# Patient Record
Sex: FEMALE | Race: WHITE | NOT HISPANIC OR LATINO | Employment: OTHER
[De-identification: names, ages, dates, MRNs, and addresses within clinical notes are randomized per-mention and may not be internally consistent; named-entity substitution may affect disease eponyms.]

---

## 2017-04-25 ENCOUNTER — RECORDS - HEALTHEAST (OUTPATIENT)
Dept: ADMINISTRATIVE | Facility: OTHER | Age: 67
End: 2017-04-25

## 2017-05-16 ENCOUNTER — RECORDS - HEALTHEAST (OUTPATIENT)
Dept: ADMINISTRATIVE | Facility: OTHER | Age: 67
End: 2017-05-16

## 2017-07-07 ENCOUNTER — OFFICE VISIT - HEALTHEAST (OUTPATIENT)
Dept: INTERNAL MEDICINE | Facility: CLINIC | Age: 67
End: 2017-07-07

## 2017-07-07 DIAGNOSIS — Z00.00 ROUTINE GENERAL MEDICAL EXAMINATION AT A HEALTH CARE FACILITY: ICD-10-CM

## 2017-07-07 LAB
CEA SERPL-MCNC: 0.8 NG/ML (ref 0–3)
CHOLEST SERPL-MCNC: 181 MG/DL
FASTING STATUS PATIENT QL REPORTED: YES
HDLC SERPL-MCNC: 53 MG/DL
LDLC SERPL CALC-MCNC: 108 MG/DL
TRIGL SERPL-MCNC: 100 MG/DL

## 2017-07-07 ASSESSMENT — MIFFLIN-ST. JEOR: SCORE: 1316.01

## 2017-07-10 ENCOUNTER — COMMUNICATION - HEALTHEAST (OUTPATIENT)
Dept: INTERNAL MEDICINE | Facility: CLINIC | Age: 67
End: 2017-07-10

## 2017-11-01 ENCOUNTER — COMMUNICATION - HEALTHEAST (OUTPATIENT)
Dept: INTERNAL MEDICINE | Facility: CLINIC | Age: 67
End: 2017-11-01

## 2017-11-01 ENCOUNTER — AMBULATORY - HEALTHEAST (OUTPATIENT)
Dept: INTERNAL MEDICINE | Facility: CLINIC | Age: 67
End: 2017-11-01

## 2017-11-01 DIAGNOSIS — M81.0 OSTEOPOROSIS: ICD-10-CM

## 2017-11-02 ENCOUNTER — AMBULATORY - HEALTHEAST (OUTPATIENT)
Dept: SCHEDULING | Facility: CLINIC | Age: 67
End: 2017-11-02

## 2017-11-02 DIAGNOSIS — M81.0 OSTEOPOROSIS: ICD-10-CM

## 2017-12-04 ENCOUNTER — HOSPITAL ENCOUNTER (OUTPATIENT)
Dept: MAMMOGRAPHY | Facility: HOSPITAL | Age: 67
Discharge: HOME | End: 2017-12-04
Attending: INTERNAL MEDICINE

## 2017-12-04 DIAGNOSIS — Z12.31 VISIT FOR SCREENING MAMMOGRAM: ICD-10-CM

## 2018-01-02 ENCOUNTER — RECORDS - HEALTHEAST (OUTPATIENT)
Dept: ADMINISTRATIVE | Facility: OTHER | Age: 68
End: 2018-01-02

## 2018-01-22 ENCOUNTER — RECORDS - HEALTHEAST (OUTPATIENT)
Dept: ADMINISTRATIVE | Facility: OTHER | Age: 68
End: 2018-01-22

## 2018-02-20 ENCOUNTER — RECORDS - HEALTHEAST (OUTPATIENT)
Dept: ADMINISTRATIVE | Facility: OTHER | Age: 68
End: 2018-02-20

## 2018-03-19 ENCOUNTER — RECORDS - HEALTHEAST (OUTPATIENT)
Dept: ADMINISTRATIVE | Facility: OTHER | Age: 68
End: 2018-03-19

## 2018-05-01 ENCOUNTER — RECORDS - HEALTHEAST (OUTPATIENT)
Dept: ADMINISTRATIVE | Facility: OTHER | Age: 68
End: 2018-05-01

## 2018-06-19 ENCOUNTER — COMMUNICATION - HEALTHEAST (OUTPATIENT)
Dept: INTERNAL MEDICINE | Facility: CLINIC | Age: 68
End: 2018-06-19

## 2018-12-04 ENCOUNTER — COMMUNICATION - HEALTHEAST (OUTPATIENT)
Dept: INTERNAL MEDICINE | Facility: CLINIC | Age: 68
End: 2018-12-04

## 2018-12-05 ENCOUNTER — AMBULATORY - HEALTHEAST (OUTPATIENT)
Dept: INTERNAL MEDICINE | Facility: CLINIC | Age: 68
End: 2018-12-05

## 2018-12-05 DIAGNOSIS — S46.001A ROTATOR CUFF INJURY, RIGHT, INITIAL ENCOUNTER: ICD-10-CM

## 2018-12-17 ENCOUNTER — RECORDS - HEALTHEAST (OUTPATIENT)
Dept: ADMINISTRATIVE | Facility: OTHER | Age: 68
End: 2018-12-17

## 2018-12-21 ENCOUNTER — RECORDS - HEALTHEAST (OUTPATIENT)
Dept: ADMINISTRATIVE | Facility: OTHER | Age: 68
End: 2018-12-21

## 2018-12-26 ENCOUNTER — COMMUNICATION - HEALTHEAST (OUTPATIENT)
Dept: INTERNAL MEDICINE | Facility: CLINIC | Age: 68
End: 2018-12-26

## 2019-01-02 ENCOUNTER — RECORDS - HEALTHEAST (OUTPATIENT)
Dept: ADMINISTRATIVE | Facility: OTHER | Age: 69
End: 2019-01-02

## 2019-01-03 ENCOUNTER — RECORDS - HEALTHEAST (OUTPATIENT)
Dept: ADMINISTRATIVE | Facility: OTHER | Age: 69
End: 2019-01-03

## 2019-01-03 ENCOUNTER — OFFICE VISIT - HEALTHEAST (OUTPATIENT)
Dept: INTERNAL MEDICINE | Facility: CLINIC | Age: 69
End: 2019-01-03

## 2019-01-03 DIAGNOSIS — Z12.11 SCREEN FOR COLON CANCER: ICD-10-CM

## 2019-01-03 DIAGNOSIS — Z01.818 PREOPERATIVE EXAMINATION: ICD-10-CM

## 2019-01-03 LAB
ALBUMIN SERPL-MCNC: 3.6 G/DL (ref 3.5–5)
ALP SERPL-CCNC: 85 U/L (ref 45–120)
ALT SERPL W P-5'-P-CCNC: 14 U/L (ref 0–45)
ANION GAP SERPL CALCULATED.3IONS-SCNC: 10 MMOL/L (ref 5–18)
AST SERPL W P-5'-P-CCNC: 18 U/L (ref 0–40)
ATRIAL RATE - MUSE: 83 BPM
BILIRUB SERPL-MCNC: 1.4 MG/DL (ref 0–1)
BUN SERPL-MCNC: 20 MG/DL (ref 8–22)
CALCIUM SERPL-MCNC: 9.4 MG/DL (ref 8.5–10.5)
CHLORIDE BLD-SCNC: 105 MMOL/L (ref 98–107)
CO2 SERPL-SCNC: 27 MMOL/L (ref 22–31)
CREAT SERPL-MCNC: 0.72 MG/DL (ref 0.6–1.1)
DIASTOLIC BLOOD PRESSURE - MUSE: NORMAL MMHG
ERYTHROCYTE [DISTWIDTH] IN BLOOD BY AUTOMATED COUNT: 12.1 % (ref 11–14.5)
GFR SERPL CREATININE-BSD FRML MDRD: >60 ML/MIN/1.73M2
GLUCOSE BLD-MCNC: 122 MG/DL (ref 70–125)
HCT VFR BLD AUTO: 37.8 % (ref 35–47)
HGB BLD-MCNC: 12.5 G/DL (ref 12–16)
INTERPRETATION ECG - MUSE: NORMAL
MCH RBC QN AUTO: 29.2 PG (ref 27–34)
MCHC RBC AUTO-ENTMCNC: 33 G/DL (ref 32–36)
MCV RBC AUTO: 89 FL (ref 80–100)
P AXIS - MUSE: 73 DEGREES
PLATELET # BLD AUTO: 194 THOU/UL (ref 140–440)
PMV BLD AUTO: 9.3 FL (ref 7–10)
POTASSIUM BLD-SCNC: 3.9 MMOL/L (ref 3.5–5)
PR INTERVAL - MUSE: 156 MS
PROT SERPL-MCNC: 6.6 G/DL (ref 6–8)
QRS DURATION - MUSE: 90 MS
QT - MUSE: 372 MS
QTC - MUSE: 437 MS
R AXIS - MUSE: 54 DEGREES
RBC # BLD AUTO: 4.27 MILL/UL (ref 3.8–5.4)
SODIUM SERPL-SCNC: 142 MMOL/L (ref 136–145)
SYSTOLIC BLOOD PRESSURE - MUSE: NORMAL MMHG
T AXIS - MUSE: 66 DEGREES
VENTRICULAR RATE- MUSE: 83 BPM
WBC: 5 THOU/UL (ref 4–11)

## 2019-01-03 ASSESSMENT — MIFFLIN-ST. JEOR: SCORE: 1294.46

## 2019-01-04 ENCOUNTER — COMMUNICATION - HEALTHEAST (OUTPATIENT)
Dept: INTERNAL MEDICINE | Facility: CLINIC | Age: 69
End: 2019-01-04

## 2019-01-07 ENCOUNTER — RECORDS - HEALTHEAST (OUTPATIENT)
Dept: ADMINISTRATIVE | Facility: OTHER | Age: 69
End: 2019-01-07

## 2019-01-08 ENCOUNTER — OFFICE VISIT - HEALTHEAST (OUTPATIENT)
Dept: PODIATRY | Facility: CLINIC | Age: 69
End: 2019-01-08

## 2019-01-08 DIAGNOSIS — M21.6X2 PRONATION DEFORMITY OF BOTH FEET: ICD-10-CM

## 2019-01-08 DIAGNOSIS — L84 TYLOMA: ICD-10-CM

## 2019-01-08 DIAGNOSIS — M21.6X1 PRONATION DEFORMITY OF BOTH FEET: ICD-10-CM

## 2019-01-08 ASSESSMENT — MIFFLIN-ST. JEOR: SCORE: 1353.88

## 2019-01-10 ENCOUNTER — AMBULATORY - HEALTHEAST (OUTPATIENT)
Dept: OTHER | Facility: CLINIC | Age: 69
End: 2019-01-10

## 2019-01-11 ENCOUNTER — COMMUNICATION - HEALTHEAST (OUTPATIENT)
Dept: OTHER | Facility: CLINIC | Age: 69
End: 2019-01-11

## 2019-01-25 ENCOUNTER — AMBULATORY - HEALTHEAST (OUTPATIENT)
Dept: OTHER | Facility: CLINIC | Age: 69
End: 2019-01-25

## 2019-01-29 ENCOUNTER — OFFICE VISIT - HEALTHEAST (OUTPATIENT)
Dept: INTERNAL MEDICINE | Facility: CLINIC | Age: 69
End: 2019-01-29

## 2019-01-29 DIAGNOSIS — Z00.00 ROUTINE GENERAL MEDICAL EXAMINATION AT A HEALTH CARE FACILITY: ICD-10-CM

## 2019-01-29 LAB
ALBUMIN SERPL-MCNC: 3.7 G/DL (ref 3.5–5)
ALBUMIN UR-MCNC: NEGATIVE MG/DL
ALP SERPL-CCNC: 85 U/L (ref 45–120)
ALT SERPL W P-5'-P-CCNC: 14 U/L (ref 0–45)
ANION GAP SERPL CALCULATED.3IONS-SCNC: 10 MMOL/L (ref 5–18)
APPEARANCE UR: CLEAR
AST SERPL W P-5'-P-CCNC: 17 U/L (ref 0–40)
BACTERIA #/AREA URNS HPF: ABNORMAL HPF
BILIRUB SERPL-MCNC: 1.2 MG/DL (ref 0–1)
BILIRUB UR QL STRIP: NEGATIVE
BUN SERPL-MCNC: 18 MG/DL (ref 8–22)
CALCIUM SERPL-MCNC: 9.4 MG/DL (ref 8.5–10.5)
CAOX CRY #/AREA URNS HPF: PRESENT /[HPF]
CEA SERPL-MCNC: 0.9 NG/ML (ref 0–3)
CHLORIDE BLD-SCNC: 106 MMOL/L (ref 98–107)
CHOLEST SERPL-MCNC: 170 MG/DL
CO2 SERPL-SCNC: 27 MMOL/L (ref 22–31)
COLOR UR AUTO: YELLOW
CREAT SERPL-MCNC: 0.64 MG/DL (ref 0.6–1.1)
ERYTHROCYTE [DISTWIDTH] IN BLOOD BY AUTOMATED COUNT: 12.7 % (ref 11–14.5)
FASTING STATUS PATIENT QL REPORTED: YES
GFR SERPL CREATININE-BSD FRML MDRD: >60 ML/MIN/1.73M2
GLUCOSE BLD-MCNC: 79 MG/DL (ref 70–125)
GLUCOSE UR STRIP-MCNC: NEGATIVE MG/DL
HCT VFR BLD AUTO: 40.2 % (ref 35–47)
HDLC SERPL-MCNC: 74 MG/DL
HGB BLD-MCNC: 12.3 G/DL (ref 12–16)
HGB UR QL STRIP: NEGATIVE
KETONES UR STRIP-MCNC: NEGATIVE MG/DL
LDLC SERPL CALC-MCNC: 82 MG/DL
LEUKOCYTE ESTERASE UR QL STRIP: ABNORMAL
MCH RBC QN AUTO: 28.3 PG (ref 27–34)
MCHC RBC AUTO-ENTMCNC: 30.6 G/DL (ref 32–36)
MCV RBC AUTO: 93 FL (ref 80–100)
MUCOUS THREADS #/AREA URNS LPF: ABNORMAL LPF
NITRATE UR QL: NEGATIVE
PH UR STRIP: 6 [PH] (ref 4.5–8)
PLATELET # BLD AUTO: 194 THOU/UL (ref 140–440)
PMV BLD AUTO: 13.3 FL (ref 8.5–12.5)
POTASSIUM BLD-SCNC: 3.5 MMOL/L (ref 3.5–5)
PROT SERPL-MCNC: 6.5 G/DL (ref 6–8)
RBC # BLD AUTO: 4.34 MILL/UL (ref 3.8–5.4)
RBC #/AREA URNS AUTO: ABNORMAL HPF
SODIUM SERPL-SCNC: 143 MMOL/L (ref 136–145)
SP GR UR STRIP: 1.01 (ref 1–1.03)
SQUAMOUS #/AREA URNS AUTO: ABNORMAL LPF
TRIGL SERPL-MCNC: 68 MG/DL
TSH SERPL DL<=0.005 MIU/L-ACNC: 2.07 UIU/ML (ref 0.3–5)
UROBILINOGEN UR STRIP-ACNC: ABNORMAL
WBC #/AREA URNS AUTO: ABNORMAL HPF
WBC: 4 THOU/UL (ref 4–11)

## 2019-01-29 ASSESSMENT — MIFFLIN-ST. JEOR: SCORE: 1299

## 2019-01-30 ENCOUNTER — COMMUNICATION - HEALTHEAST (OUTPATIENT)
Dept: INTERNAL MEDICINE | Facility: CLINIC | Age: 69
End: 2019-01-30

## 2019-01-30 LAB — BACTERIA SPEC CULT: NO GROWTH

## 2019-01-31 ENCOUNTER — COMMUNICATION - HEALTHEAST (OUTPATIENT)
Dept: INTERNAL MEDICINE | Facility: CLINIC | Age: 69
End: 2019-01-31

## 2019-02-15 ENCOUNTER — RECORDS - HEALTHEAST (OUTPATIENT)
Dept: ADMINISTRATIVE | Facility: OTHER | Age: 69
End: 2019-02-15

## 2019-03-26 ENCOUNTER — RECORDS - HEALTHEAST (OUTPATIENT)
Dept: ADMINISTRATIVE | Facility: OTHER | Age: 69
End: 2019-03-26

## 2019-07-02 ENCOUNTER — OFFICE VISIT - HEALTHEAST (OUTPATIENT)
Dept: INTERNAL MEDICINE | Facility: CLINIC | Age: 69
End: 2019-07-02

## 2019-07-02 DIAGNOSIS — M81.0 AGE-RELATED OSTEOPOROSIS WITHOUT CURRENT PATHOLOGICAL FRACTURE: ICD-10-CM

## 2019-07-02 LAB
CALCIUM SERPL-MCNC: 9.5 MG/DL (ref 8.5–10.5)
CREAT SERPL-MCNC: 0.67 MG/DL (ref 0.6–1.1)
GFR SERPL CREATININE-BSD FRML MDRD: >60 ML/MIN/1.73M2
PHOSPHATE SERPL-MCNC: 4.6 MG/DL (ref 2.5–4.5)
PTH-INTACT SERPL-MCNC: 69 PG/ML (ref 10–86)

## 2019-07-03 LAB
25(OH)D3 SERPL-MCNC: 16.2 NG/ML (ref 30–80)
GLIADIN IGA SER-ACNC: 2.2 U/ML
GLIADIN IGG SER-ACNC: <0.4 U/ML
IGA SERPL-MCNC: 225 MG/DL (ref 65–400)
TTG IGA SER-ACNC: 0.7 U/ML
TTG IGG SER-ACNC: <0.6 U/ML

## 2019-07-05 ENCOUNTER — AMBULATORY - HEALTHEAST (OUTPATIENT)
Dept: LAB | Facility: CLINIC | Age: 69
End: 2019-07-05

## 2019-07-05 DIAGNOSIS — M81.0 AGE-RELATED OSTEOPOROSIS WITHOUT CURRENT PATHOLOGICAL FRACTURE: ICD-10-CM

## 2019-07-05 LAB
ALBUMIN PERCENT: 64.8 % (ref 51–67)
ALBUMIN SERPL ELPH-MCNC: 4 G/DL (ref 3.2–4.7)
ALPHA 1 PERCENT: 2.6 % (ref 2–4)
ALPHA 2 PERCENT: 10 % (ref 5–13)
ALPHA1 GLOB SERPL ELPH-MCNC: 0.2 G/DL (ref 0.1–0.3)
ALPHA2 GLOB SERPL ELPH-MCNC: 0.6 G/DL (ref 0.4–0.9)
B-GLOBULIN SERPL ELPH-MCNC: 0.8 G/DL (ref 0.7–1.2)
BETA PERCENT: 12.2 % (ref 10–17)
CALCIUM 24H UR-MRATE: 284 MG/24HR (ref 20–275)
CALCIUM UR-MCNC: 10.8 MG/DL
GAMMA GLOB SERPL ELPH-MCNC: 0.6 G/DL (ref 0.6–1.4)
GAMMA GLOBULIN PERCENT: 10.4 % (ref 9–20)
PATH ICD:: NORMAL
PATH ICD:: NORMAL
PROT PATTERN SERPL ELPH-IMP: NORMAL
PROT PATTERN SERPL ELPH-IMP: NORMAL
PROT SERPL-MCNC: 6.2 G/DL (ref 6–8)
REVIEWING PATHOLOGIST: NORMAL
REVIEWING PATHOLOGIST: NORMAL
SPECIMEN VOL UR: 2625 ML
TOTAL PROTEIN RANDOM URINE MG/DL: 9 MG/DL

## 2019-07-10 ENCOUNTER — COMMUNICATION - HEALTHEAST (OUTPATIENT)
Dept: INTERNAL MEDICINE | Facility: CLINIC | Age: 69
End: 2019-07-10

## 2019-07-10 DIAGNOSIS — M81.0 AGE-RELATED OSTEOPOROSIS WITHOUT CURRENT PATHOLOGICAL FRACTURE: ICD-10-CM

## 2019-07-26 ENCOUNTER — COMMUNICATION - HEALTHEAST (OUTPATIENT)
Dept: ADMINISTRATIVE | Facility: CLINIC | Age: 69
End: 2019-07-26

## 2019-07-30 ENCOUNTER — RECORDS - HEALTHEAST (OUTPATIENT)
Dept: ADMINISTRATIVE | Facility: OTHER | Age: 69
End: 2019-07-30

## 2019-09-06 ENCOUNTER — RECORDS - HEALTHEAST (OUTPATIENT)
Dept: ADMINISTRATIVE | Facility: OTHER | Age: 69
End: 2019-09-06

## 2019-09-11 ENCOUNTER — COMMUNICATION - HEALTHEAST (OUTPATIENT)
Dept: SCHEDULING | Facility: CLINIC | Age: 69
End: 2019-09-11

## 2019-09-11 ENCOUNTER — AMBULATORY - HEALTHEAST (OUTPATIENT)
Dept: INTERNAL MEDICINE | Facility: CLINIC | Age: 69
End: 2019-09-11

## 2019-09-11 ENCOUNTER — AMBULATORY - HEALTHEAST (OUTPATIENT)
Dept: LAB | Facility: CLINIC | Age: 69
End: 2019-09-11

## 2019-09-11 ENCOUNTER — COMMUNICATION - HEALTHEAST (OUTPATIENT)
Dept: INTERNAL MEDICINE | Facility: CLINIC | Age: 69
End: 2019-09-11

## 2019-09-11 DIAGNOSIS — R30.0 DYSURIA: ICD-10-CM

## 2019-09-11 LAB
ALBUMIN UR-MCNC: NEGATIVE MG/DL
APPEARANCE UR: CLEAR
BILIRUB UR QL STRIP: NEGATIVE
COLOR UR AUTO: YELLOW
GLUCOSE UR STRIP-MCNC: NEGATIVE MG/DL
HGB UR QL STRIP: NEGATIVE
KETONES UR STRIP-MCNC: NEGATIVE MG/DL
LEUKOCYTE ESTERASE UR QL STRIP: NEGATIVE
NITRATE UR QL: NEGATIVE
PH UR STRIP: 6.5 [PH] (ref 5–8)
SP GR UR STRIP: 1.01 (ref 1–1.03)
UROBILINOGEN UR STRIP-ACNC: NORMAL

## 2019-11-16 ENCOUNTER — RECORDS - HEALTHEAST (OUTPATIENT)
Dept: ADMINISTRATIVE | Facility: OTHER | Age: 69
End: 2019-11-16

## 2019-12-09 ENCOUNTER — COMMUNICATION - HEALTHEAST (OUTPATIENT)
Dept: ADMINISTRATIVE | Facility: CLINIC | Age: 69
End: 2019-12-09

## 2020-01-03 ENCOUNTER — COMMUNICATION - HEALTHEAST (OUTPATIENT)
Dept: VASCULAR SURGERY | Facility: CLINIC | Age: 70
End: 2020-01-03

## 2020-01-06 ENCOUNTER — AMBULATORY - HEALTHEAST (OUTPATIENT)
Dept: PODIATRY | Facility: CLINIC | Age: 70
End: 2020-01-06

## 2020-01-06 DIAGNOSIS — M21.6X1 PRONATION DEFORMITY OF BOTH FEET: ICD-10-CM

## 2020-01-06 DIAGNOSIS — M21.6X2 PRONATION DEFORMITY OF BOTH FEET: ICD-10-CM

## 2020-01-09 ENCOUNTER — AMBULATORY - HEALTHEAST (OUTPATIENT)
Dept: OTHER | Facility: CLINIC | Age: 70
End: 2020-01-09

## 2020-02-03 ENCOUNTER — AMBULATORY - HEALTHEAST (OUTPATIENT)
Dept: OTHER | Facility: CLINIC | Age: 70
End: 2020-02-03

## 2020-06-15 ENCOUNTER — COMMUNICATION - HEALTHEAST (OUTPATIENT)
Dept: SCHEDULING | Facility: CLINIC | Age: 70
End: 2020-06-15

## 2020-07-08 ENCOUNTER — COMMUNICATION - HEALTHEAST (OUTPATIENT)
Dept: SCHEDULING | Facility: CLINIC | Age: 70
End: 2020-07-08

## 2020-07-09 ENCOUNTER — OFFICE VISIT - HEALTHEAST (OUTPATIENT)
Dept: FAMILY MEDICINE | Facility: CLINIC | Age: 70
End: 2020-07-09

## 2020-07-09 DIAGNOSIS — S80.861S TICK BITE OF RIGHT LOWER LEG, SEQUELA: ICD-10-CM

## 2020-07-09 DIAGNOSIS — L03.115 CELLULITIS OF RIGHT LOWER EXTREMITY: ICD-10-CM

## 2020-07-09 DIAGNOSIS — A69.20 LYME DISEASE: ICD-10-CM
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
## 2020-07-09 DIAGNOSIS — Z13.9 SCREENING FOR CONDITION: ICD-10-CM

## 2020-07-09 LAB
BASOPHILS # BLD AUTO: 0 THOU/UL (ref 0–0.2)
BASOPHILS NFR BLD AUTO: 1 % (ref 0–2)
EOSINOPHIL # BLD AUTO: 0.3 THOU/UL (ref 0–0.4)
EOSINOPHIL NFR BLD AUTO: 5 % (ref 0–6)
ERYTHROCYTE [DISTWIDTH] IN BLOOD BY AUTOMATED COUNT: 13 % (ref 11–14.5)
HCT VFR BLD AUTO: 36.7 % (ref 35–47)
HGB BLD-MCNC: 12.5 G/DL (ref 12–16)
LYMPHOCYTES # BLD AUTO: 1.8 THOU/UL (ref 0.8–4.4)
LYMPHOCYTES NFR BLD AUTO: 26 % (ref 20–40)
MCH RBC QN AUTO: 29.5 PG (ref 27–34)
MCHC RBC AUTO-ENTMCNC: 34.1 G/DL (ref 32–36)
MCV RBC AUTO: 87 FL (ref 80–100)
MONOCYTES # BLD AUTO: 0.5 THOU/UL (ref 0–0.9)
MONOCYTES NFR BLD AUTO: 7 % (ref 2–10)
NEUTROPHILS # BLD AUTO: 4.2 THOU/UL (ref 2–7.7)
NEUTROPHILS NFR BLD AUTO: 61 % (ref 50–70)
PLATELET # BLD AUTO: 153 THOU/UL (ref 140–440)
PMV BLD AUTO: 10 FL (ref 7–10)
RBC # BLD AUTO: 4.24 MILL/UL (ref 3.8–5.4)
WBC: 6.9 THOU/UL (ref 4–11)

## 2020-07-10 ENCOUNTER — COMMUNICATION - HEALTHEAST (OUTPATIENT)
Dept: SCHEDULING | Facility: CLINIC | Age: 70
End: 2020-07-10

## 2020-07-10 LAB — B BURGDOR IGG+IGM SER QL: 0.05 INDEX VALUE

## 2020-07-11 ENCOUNTER — OFFICE VISIT - HEALTHEAST (OUTPATIENT)
Dept: FAMILY MEDICINE | Facility: CLINIC | Age: 70
End: 2020-07-11

## 2020-07-11 DIAGNOSIS — S80.861S TICK BITE OF RIGHT LOWER LEG, SEQUELA: ICD-10-CM

## 2020-07-11 DIAGNOSIS — Z51.89 ENCOUNTER FOR WOUND RE-CHECK: ICD-10-CM
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
## 2020-07-11 DIAGNOSIS — L03.115 CELLULITIS OF RIGHT LOWER EXTREMITY: ICD-10-CM

## 2020-07-28 ENCOUNTER — OFFICE VISIT - HEALTHEAST (OUTPATIENT)
Dept: INTERNAL MEDICINE | Facility: CLINIC | Age: 70
End: 2020-07-28

## 2020-07-28 DIAGNOSIS — R10.32 GROIN PAIN, LEFT: ICD-10-CM

## 2020-07-28 ASSESSMENT — MIFFLIN-ST. JEOR: SCORE: 1357.96

## 2020-08-25 ENCOUNTER — AMBULATORY - HEALTHEAST (OUTPATIENT)
Dept: ADMINISTRATIVE | Facility: CLINIC | Age: 70
End: 2020-08-25

## 2020-08-25 DIAGNOSIS — K46.9 ABDOMINAL HERNIA WITHOUT OBSTRUCTION AND WITHOUT GANGRENE, RECURRENCE NOT SPECIFIED, UNSPECIFIED HERNIA TYPE: ICD-10-CM

## 2020-08-28 ENCOUNTER — OFFICE VISIT - HEALTHEAST (OUTPATIENT)
Dept: SURGERY | Facility: CLINIC | Age: 70
End: 2020-08-28

## 2020-08-28 DIAGNOSIS — K40.90 LEFT INGUINAL HERNIA: ICD-10-CM

## 2020-08-28 ASSESSMENT — MIFFLIN-ST. JEOR: SCORE: 1348.89

## 2020-08-31 ENCOUNTER — COMMUNICATION - HEALTHEAST (OUTPATIENT)
Dept: SURGERY | Facility: CLINIC | Age: 70
End: 2020-08-31

## 2020-09-01 ENCOUNTER — AMBULATORY - HEALTHEAST (OUTPATIENT)
Dept: SURGERY | Facility: AMBULATORY SURGERY CENTER | Age: 70
End: 2020-09-01

## 2020-09-01 ENCOUNTER — SURGERY - HEALTHEAST (OUTPATIENT)
Dept: SURGERY | Facility: CLINIC | Age: 70
End: 2020-09-01

## 2020-09-01 DIAGNOSIS — Z11.59 ENCOUNTER FOR SCREENING FOR OTHER VIRAL DISEASES: ICD-10-CM

## 2020-09-03 ENCOUNTER — COMMUNICATION - HEALTHEAST (OUTPATIENT)
Dept: INTERNAL MEDICINE | Facility: CLINIC | Age: 70
End: 2020-09-03

## 2020-09-11 ENCOUNTER — OFFICE VISIT - HEALTHEAST (OUTPATIENT)
Dept: INTERNAL MEDICINE | Facility: CLINIC | Age: 70
End: 2020-09-11

## 2020-09-11 DIAGNOSIS — Z01.818 PREOPERATIVE EXAMINATION: ICD-10-CM

## 2020-09-11 LAB
ALBUMIN SERPL-MCNC: 3.8 G/DL (ref 3.5–5)
ALP SERPL-CCNC: 73 U/L (ref 45–120)
ALT SERPL W P-5'-P-CCNC: 10 U/L (ref 0–45)
ANION GAP SERPL CALCULATED.3IONS-SCNC: 9 MMOL/L (ref 5–18)
AST SERPL W P-5'-P-CCNC: 15 U/L (ref 0–40)
ATRIAL RATE - MUSE: 72 BPM
BILIRUB SERPL-MCNC: 0.7 MG/DL (ref 0–1)
BUN SERPL-MCNC: 11 MG/DL (ref 8–28)
CALCIUM SERPL-MCNC: 9.3 MG/DL (ref 8.5–10.5)
CHLORIDE BLD-SCNC: 104 MMOL/L (ref 98–107)
CO2 SERPL-SCNC: 27 MMOL/L (ref 22–31)
CREAT SERPL-MCNC: 0.68 MG/DL (ref 0.6–1.1)
DIASTOLIC BLOOD PRESSURE - MUSE: NORMAL
ERYTHROCYTE [DISTWIDTH] IN BLOOD BY AUTOMATED COUNT: 13.1 % (ref 11–14.5)
GFR SERPL CREATININE-BSD FRML MDRD: >60 ML/MIN/1.73M2
GLUCOSE BLD-MCNC: 81 MG/DL (ref 70–125)
HCT VFR BLD AUTO: 38.3 % (ref 35–47)
HGB BLD-MCNC: 12.7 G/DL (ref 12–16)
INTERPRETATION ECG - MUSE: NORMAL
MCH RBC QN AUTO: 29.5 PG (ref 27–34)
MCHC RBC AUTO-ENTMCNC: 33.1 G/DL (ref 32–36)
MCV RBC AUTO: 89 FL (ref 80–100)
P AXIS - MUSE: 36 DEGREES
PLATELET # BLD AUTO: 167 THOU/UL (ref 140–440)
PMV BLD AUTO: 10.1 FL (ref 7–10)
POTASSIUM BLD-SCNC: 4 MMOL/L (ref 3.5–5)
PR INTERVAL - MUSE: 134 MS
PROT SERPL-MCNC: 6.7 G/DL (ref 6–8)
QRS DURATION - MUSE: 92 MS
QT - MUSE: 392 MS
QTC - MUSE: 429 MS
R AXIS - MUSE: 63 DEGREES
RBC # BLD AUTO: 4.3 MILL/UL (ref 3.8–5.4)
SODIUM SERPL-SCNC: 140 MMOL/L (ref 136–145)
SYSTOLIC BLOOD PRESSURE - MUSE: NORMAL
T AXIS - MUSE: 62 DEGREES
VENTRICULAR RATE- MUSE: 72 BPM
WBC: 4.5 THOU/UL (ref 4–11)

## 2020-09-11 ASSESSMENT — MIFFLIN-ST. JEOR: SCORE: 1335.85

## 2020-09-14 ENCOUNTER — AMBULATORY - HEALTHEAST (OUTPATIENT)
Dept: FAMILY MEDICINE | Facility: CLINIC | Age: 70
End: 2020-09-14

## 2020-09-14 ENCOUNTER — COMMUNICATION - HEALTHEAST (OUTPATIENT)
Dept: INTERNAL MEDICINE | Facility: CLINIC | Age: 70
End: 2020-09-14

## 2020-09-14 ENCOUNTER — RECORDS - HEALTHEAST (OUTPATIENT)
Dept: ADMINISTRATIVE | Facility: OTHER | Age: 70
End: 2020-09-14

## 2020-09-14 DIAGNOSIS — Z11.59 ENCOUNTER FOR SCREENING FOR OTHER VIRAL DISEASES: ICD-10-CM

## 2020-09-16 ENCOUNTER — ANESTHESIA - HEALTHEAST (OUTPATIENT)
Dept: SURGERY | Facility: AMBULATORY SURGERY CENTER | Age: 70
End: 2020-09-16

## 2020-09-16 ENCOUNTER — COMMUNICATION - HEALTHEAST (OUTPATIENT)
Dept: SCHEDULING | Facility: CLINIC | Age: 70
End: 2020-09-16

## 2020-09-17 ENCOUNTER — SURGERY - HEALTHEAST (OUTPATIENT)
Dept: SURGERY | Facility: AMBULATORY SURGERY CENTER | Age: 70
End: 2020-09-17

## 2020-09-17 ASSESSMENT — MIFFLIN-ST. JEOR: SCORE: 1356.83

## 2020-10-06 ENCOUNTER — COMMUNICATION - HEALTHEAST (OUTPATIENT)
Dept: SURGERY | Facility: CLINIC | Age: 70
End: 2020-10-06

## 2020-10-08 ENCOUNTER — OFFICE VISIT - HEALTHEAST (OUTPATIENT)
Dept: SURGERY | Facility: CLINIC | Age: 70
End: 2020-10-08

## 2020-10-08 DIAGNOSIS — K40.90 LEFT INGUINAL HERNIA: ICD-10-CM

## 2020-10-27 ENCOUNTER — RECORDS - HEALTHEAST (OUTPATIENT)
Dept: ADMINISTRATIVE | Facility: OTHER | Age: 70
End: 2020-10-27

## 2020-10-29 ENCOUNTER — RECORDS - HEALTHEAST (OUTPATIENT)
Dept: ADMINISTRATIVE | Facility: OTHER | Age: 70
End: 2020-10-29
Payer: COMMERCIAL

## 2020-11-12 ENCOUNTER — VIRTUAL VISIT (OUTPATIENT)
Dept: FAMILY MEDICINE | Facility: CLINIC | Age: 70
End: 2020-11-12
Payer: COMMERCIAL

## 2020-11-12 DIAGNOSIS — R68.83 CHILLS: ICD-10-CM

## 2020-11-12 DIAGNOSIS — Z20.822 EXPOSURE TO COVID-19 VIRUS: ICD-10-CM

## 2020-11-12 DIAGNOSIS — R52 BODY ACHES: Primary | ICD-10-CM

## 2020-11-12 PROCEDURE — 99214 OFFICE O/P EST MOD 30 MIN: CPT | Mod: 95 | Performed by: PHYSICIAN ASSISTANT

## 2020-11-12 RX ORDER — KETOCONAZOLE 20 MG/G
CREAM TOPICAL
COMMUNITY
Start: 2020-04-27

## 2020-11-12 NOTE — PATIENT INSTRUCTIONS
Your symptoms show that you may have coronavirus (COVID-19). This illness can cause fever, cough and trouble  breathing. Many people get a mild case and get better on their own. Some people can get very sick.    Quarantine is for those who have had a close contact to someone who is COVID positive. A close contact is defined as being within 6 feet for 15 minutes or longer. We recommend you stay home and away from others for 14 days to monitor for symptoms. If you develop symptoms, enter isolation guidelines and be tested for COVID-19.    Isolation is for those who have symptoms or test positive for COVID-19. You should remain home and away from others for 10 days after your symptom onset. You may return to activities after 10 days as long as you have been fever free for 24 hours and have had an improvement in your symptoms.    What should I do?  1. We would like to test you for this virus.    2. When it s time for your COVID test:    Stay at least 6 feet away from others. (If someone will drive you to your test, stay in the backseat, as  far away from the  as you can.)    Cover your mouth and nose with a mask, tissue or washcloth.    Go straight to the testing site. Don't make any stops on the way there or back.    3. Starting now: Stay home and away from others (self-isolate) until: November 22, 2020    You've had no fever--and no medicine that reduces fever--for 3 full days (72 hours). And     Your other symptoms have gotten better. For example, your cough or breathing has improved. And     At least 10 days have passed since your symptoms started.    4. During this time, don t leave the house except for testing or medical care.    Stay in your own room, even for meals. Use your own bathroom if you can.    Stay away from others in your home. No hugging, kissing or shaking hands. No visitors.    Don t go to work, school or anywhere else.    Clean  high touch  surfaces often (doorknobs, counters, handles, etc.).  Use a household cleaning  spray or wipes. You ll find a full list of  on the EPA website: www.epa.gov/pesticideregistration/  list-n-disinfectants-use-against-sars-cov-2.    Cover your mouth and nose with a mask, tissue or washcloth to avoid spreading germs.    Wash your hands and face often. Use soap and water.    Caregivers in these groups are at risk for severe illness due to COVID-19:  o People 65 years and older  o People who live in a nursing home or long-term care facility  o People with chronic disease (lung, heart, cancer, diabetes, kidney, liver, immunologic)  o People who have a weakened immune system, including those who:    Are in cancer treatment    Take medicine that weakens the immune system, such as corticosteroids    Had a bone marrow or organ transplant    Have an immune deficiency    Have poorly controlled HIV or AIDS    Are obese (body mass index of 40 or higher)    Smoke regularly  o Caregivers should wear gloves while washing dishes, handling laundry and cleaning  bedrooms and bathrooms.  o Use caution when washing and drying laundry: Don t shake dirty laundry, and use the  warmest water setting that you can.  o For more tips, go to www.cdc.gov/coronavirus/2019-ncov/downloads/10Things.pdf.    How can I take care of myself?  1. Get lots of rest. Drink extra fluids (unless a doctor has told you not to).  2. Take Tylenol (acetaminophen) for fever or pain. If you have liver or kidney problems, ask your family  doctor if it's okay to take Tylenol.  Adults can take either:    650 mg (two 325 mg pills) every 4 to 6 hours, or     1,000 mg (two 500 mg pills) every 8 hours as needed.    Note: Don't take more than 3,000 mg in one day. Acetaminophen is found in many medicines  (both prescribed and over-the-counter medicines). Read all labels to be sure you don t take too  much.  For children, check the Tylenol bottle for the right dose. The dose is based on the child's age or weight.  3. If you  have other health problems (like cancer, heart failure, an organ transplant or severe kidney  disease): Call your specialty clinic if you don't feel better in the next 2 days.  4. Know when to call 911. Emergency warning signs include:    Trouble breathing or shortness of breath    Pain or pressure in the chest that doesn t go away    Feeling confused like you haven t felt before, or not being able to wake up    Bluish-colored lips or face    Where can I get more information?    [facility] - About COVID-19: [email or web address]    CDC - What to Do If You re Sick: www.cdc.gov/coronavirus/2019-ncov/about/steps-when-sick.html    CDC - Ending Home Isolation: www.cdc.gov/coronavirus/2019-ncov/hcp/disposition-in-homepatients.  html    [facility] - Caring for Someone: www.cdc.gov/coronavirus/2019-ncov/if-you-are-sick/care-for-someone.html    [facility] - Interim Guidance for Hospital Discharge to Home:  www.[facility].[place]./diseases/coronavirus/hcp/hospdischarge.pdf    [facility] clinical trials (COVID-19 research studies): clinicalaffairs.[facility]-clinicaltrials    Below are the COVID-19 hotlines at the [place] Department of Health ([facility]). Interpreters are  available.  o For health questions: Call 614-186-7990 or 1-911.486.3921 (7 a.m. to 7 p.m.)  o For questions about schools and childcare: Call 815-522-5207 or [phone] (7 a.m. to 7  p.m.)

## 2020-11-12 NOTE — PROGRESS NOTES
"Vidya Brink is a 70 year old female who is being evaluated via a billable telephone visit.      The patient has been notified of following:     \"This telephone visit will be conducted via a call between you and your physician/provider. We have found that certain health care needs can be provided without the need for a physical exam.  This service lets us provide the care you need with a short phone conversation.  If a prescription is necessary we can send it directly to your pharmacy.  If lab work is needed we can place an order for that and you can then stop by our lab to have the test done at a later time.    Telephone visits are billed at different rates depending on your insurance coverage. During this emergency period, for some insurers they may be billed the same as an in-person visit.  Please reach out to your insurance provider with any questions.    If during the course of the call the physician/provider feels a telephone visit is not appropriate, you will not be charged for this service.\"    Patient has given verbal consent for Telephone visit?  Yes    What phone number would you like to be contacted at? [phone]    How would you like to obtain your AVS? Mail a copy    Subjective     Vidya Brink is a 70 year old female who presents via phone visit today for the following health issues:    HPI     Acute Illness  Acute illness concerns: cough with post nasal drip,  tested positive for covid on Monday  Onset/Duration: x 1day  Symptoms:  Fever: no  Chills/Sweats: no  Headache (location?): YES, slight  Sinus Pressure: no  Conjunctivitis:  no  Ear Pain: no  Rhinorrhea: YES, post nasal and runny nose  Congestion: no  Sore Throat: YES, feels lightly raw since today  Cough: YES-non-productive  Wheeze: no  Decreased Appetite: no  Nausea: no  Vomiting: no  Diarrhea: YES,loose stools  Dysuria/Freq.: no  Dysuria or Hematuria: no  Fatigue/Achiness: no  Sick/Strep Exposure: no  Therapies tried " and outcome: elderberry gummies and zinc, feels about the same     Vidya presents via telephone visit today for evaluation of COVID exposure and subsequent minor symptoms. She states that she and her  attended a meeting and her  would not wear his mask while there but she did wear her mask and maintain a distance the entire time. He then left to go hunting and came home on Sunday Nov 8 feeling sick. She immediately packed her bags and left, within an hour or so of him coming home. They were not in the same room for more than a couple minutes. Then last night she just didn't feel great. She had some body aches, a slight headache, a little rhinorrhea with post nasal drip and loose stools.     Review of Systems          Objective          Vitals:  No vitals were obtained today due to virtual visit.    healthy, alert and no distress  PSYCH: Alert and oriented times 3; coherent speech, normal   rate and volume, able to articulate logical thoughts, able   to abstract reason, no tangential thoughts, no hallucinations   or delusions  Her affect is normal and pleasant  RESP: No cough, no audible wheezing, able to talk in full sentences  Remainder of exam unable to be completed due to telephone visits      Assessment/Plan:    Assessment & Plan     Body aches  - COVID-19 GetWell Loop Referral  - Symptomatic COVID-19 Virus (Coronavirus) by PCR; Future    Chills  - COVID-19 GetWell Loop Referral  - Symptomatic COVID-19 Virus (Coronavirus) by PCR; Future    Exposure to COVID-19 virus  Exposure was not considered a high risk exposure. This was on November 8 so her COVID test should be scheduled for Nov 13-15 if possible.    Return in about 2 days (around 11/14/2020) for a COVID test.    Stephanie Suárez PA-C  [facility]    Phone call duration:  11 minutes

## 2020-11-13 DIAGNOSIS — R52 BODY ACHES: ICD-10-CM

## 2020-11-13 DIAGNOSIS — R68.83 CHILLS: ICD-10-CM

## 2020-11-13 PROCEDURE — U0003 INFECTIOUS AGENT DETECTION BY NUCLEIC ACID (DNA OR RNA); SEVERE ACUTE RESPIRATORY SYNDROME CORONAVIRUS 2 (SARS-COV-2) (CORONAVIRUS DISEASE [COVID-19]), AMPLIFIED PROBE TECHNIQUE, MAKING USE OF HIGH THROUGHPUT TECHNOLOGIES AS DESCRIBED BY CMS-2020-01-R: HCPCS | Performed by: PHYSICIAN ASSISTANT

## 2020-11-14 ENCOUNTER — HEALTH MAINTENANCE LETTER (OUTPATIENT)
Age: 70
End: 2020-11-14

## 2020-11-14 LAB
SARS-COV-2 RNA SPEC QL NAA+PROBE: NOT DETECTED
SPECIMEN SOURCE: NORMAL

## 2021-01-28 ENCOUNTER — RECORDS - HEALTHEAST (OUTPATIENT)
Dept: ADMINISTRATIVE | Facility: OTHER | Age: 71
End: 2021-01-28

## 2021-05-24 ENCOUNTER — RECORDS - HEALTHEAST (OUTPATIENT)
Dept: ADMINISTRATIVE | Facility: CLINIC | Age: 71
End: 2021-05-24

## 2021-05-25 ENCOUNTER — RECORDS - HEALTHEAST (OUTPATIENT)
Dept: ADMINISTRATIVE | Facility: CLINIC | Age: 71
End: 2021-05-25

## 2021-05-26 ENCOUNTER — RECORDS - HEALTHEAST (OUTPATIENT)
Dept: ADMINISTRATIVE | Facility: CLINIC | Age: 71
End: 2021-05-26

## 2021-05-26 VITALS
HEART RATE: 83 BPM | SYSTOLIC BLOOD PRESSURE: 97 MMHG | TEMPERATURE: 98.5 F | RESPIRATION RATE: 12 BRPM | OXYGEN SATURATION: 96 % | DIASTOLIC BLOOD PRESSURE: 61 MMHG

## 2021-05-27 ENCOUNTER — RECORDS - HEALTHEAST (OUTPATIENT)
Dept: ADMINISTRATIVE | Facility: CLINIC | Age: 71
End: 2021-05-27

## 2021-05-28 ENCOUNTER — RECORDS - HEALTHEAST (OUTPATIENT)
Dept: ADMINISTRATIVE | Facility: CLINIC | Age: 71
End: 2021-05-28

## 2021-05-30 ENCOUNTER — RECORDS - HEALTHEAST (OUTPATIENT)
Dept: ADMINISTRATIVE | Facility: CLINIC | Age: 71
End: 2021-05-30

## 2021-05-30 NOTE — TELEPHONE ENCOUNTER
Patient is calling concerned about the bill from her last apt in January.  She came in to get updated orthotics.  During apt she had a corn debrided and has received a bill for $134 and is upset because she didn't ask for this to be done. She would like someone to call and speak with her about this.

## 2021-05-30 NOTE — PROGRESS NOTES
Dear Dr. Peterson ,  Your patient, Vidya Brink was seen today for management of osteoporosis.  The last bone density scan was done on 12/4/2017:  1. The spine bone density L1-L2 with T-score -2.7.  2. Femoral bone densities show left femoral neck T- score -1.8 and right total hip T-score -1.5.  3. Trabecular bone score indicates poor trabecular bone architecture.    Patient was treated in the past with Fosamax for few weeks ( stopped because of the stiff joints), and Actonel for 5 years - stopped in 2011. No active treatment since then.    Social history:  reports that she has never smoked. She has never used smokeless tobacco. She reports that she does not drink alcohol or use drugs.    Past medical history: James syndrome and partial colectomy, rectal cancer 1989.  Patient Active Problem List   Diagnosis     Adenocarcinoid Tumor Of The Large Intestine     Community-acquired Pneumonia     Osteoporosis     Abnormal Blood Chemistry     Dysphagia     Headache     Palpitations     Osteopenia     Health maintenance examination     Otalgia of right ear     Ceruminosis     History of fractures: only toe fracture     FH: her mother had OP and hip fracture, her both sister has low bone density  Family History   Problem Relation Age of Onset     Breast cancer Paternal Grandmother 48       Diet and supplements: Ca 600 mg two times a day, vit D 400 Iu bid    Risk medications: none    Gynecologic history: menopause age 49, no HRT    Laboratory testing:   Component      Latest Ref Rng & Units 1/29/2019   Sodium      136 - 145 mmol/L 143   Potassium      3.5 - 5.0 mmol/L 3.5   Chloride      98 - 107 mmol/L 106   CO2      22 - 31 mmol/L 27   Anion Gap, Calculation      5 - 18 mmol/L 10   Glucose      70 - 125 mg/dL 79   BUN      8 - 22 mg/dL 18   Creatinine      0.60 - 1.10 mg/dL 0.64   GFR MDRD Af Amer      >60 mL/min/1.73m2 >60   GFR MDRD Non Af Amer      >60 mL/min/1.73m2 >60   Bilirubin, Total      0.0 - 1.0 mg/dL 1.2 (H)    Calcium      8.5 - 10.5 mg/dL 9.4   Protein, Total      6.0 - 8.0 g/dL 6.5   ALBUMIN      3.5 - 5.0 g/dL 3.7   Alkaline Phosphatase      45 - 120 U/L 85   AST      0 - 40 U/L 17   ALT      0 - 45 U/L 14   WBC      4.0 - 11.0 thou/uL 4.0   RBC      3.80 - 5.40 mill/uL 4.34   Hemoglobin      12.0 - 16.0 g/dL 12.3   Hematocrit      35.0 - 47.0 % 40.2   MCV      80 - 100 fL 93   MCH      27.0 - 34.0 pg 28.3   MCHC      32.0 - 36.0 g/dL 30.6 (L)   RDW      11.0 - 14.5 % 12.7   Platelets      140 - 440 thou/uL 194   MPV      8.5 - 12.5 fL 13.3 (H)   TSH      0.30 - 5.00 uIU/mL 2.07       ROS:     Constitutional: Negative.    HENT: Negative.    Eyes: Negative.    Respiratory: Negative.    Cardiovascular: Negative.    Gastrointestinal: Negative.    Endocrine: Negative.    Genitourinary: Negative.    Musculoskeletal: Negative.    Skin: Negative.    Allergic/Immunologic: Negative.    Neurological: Negative.    Hematological: Negative.    Psychiatric/Behavioral: Negative.      PE:  /62   Pulse 83   Wt 168 lb 14.4 oz (76.6 kg)   LMP 11/11/2001   SpO2 96%   BMI 26.06 kg/m    Constitutional:  oriented to person, place, and time, appears well-nourished. No distress.   Head: normocephalic  Neck:supple  Lungs: no labored breathing  Musculoskeletal: normal gait, no swelling  Neuro: no focal neurological deficits          Impression:   1. Age-related osteoporosis without current pathological fracture  Vitamin D, Total (25-Hydroxy)    Parathyroid Hormone Intact with Minerals    Electrophoresis, Protein, Serum, Cascade    Electrophoresis, Protein, Random Urine Ccade    Celiac(Gluten)Antibody Panel    Calcium, 24 Hour Urine       Plan:  1. The patient will take 1200 - 1500 mg of calcium daily from the diet and supplements.  2. The patient will take 2000 IU of vit D daily.  3. Treatment options discussed with the patient. I will check PTH, SPEP, 24 h urine calcium and vit D. We will wait for Prolia approval.  4. I am asking  for follow when Prolia approved .    Patient Instructions   Prolia 1st when approved by your insurance.  Prolia 2nd in 6 months. I will see you in 1 year.    DXA in 12/2019 in [place] .   Phone number to schedule [phone].    Daily calcium need is 5854-8345 mg a day from the diet and supplements.  Calcium citrate is easier to digest.  Vitamin D 2000 IU daily recommended.        Thank you for the opportunity to participate in the care of your patient. If you have any additional questions, do not hesitate to contact me at [facility].    This note has been dictated using voice recognition software. Any grammatical or context distortions are unintentional and inherent to the software      With best personal regards,   Jessica Butler MD, CCD  7/2/2019

## 2021-05-30 NOTE — TELEPHONE ENCOUNTER
Attempted to call but was not able to leave voicemail. Will try back at a later time.  Josefa Georges CMA ............... 3:52 PM, 07/10/19

## 2021-05-30 NOTE — TELEPHONE ENCOUNTER
Patient Returning Call  Reason for call:  Patient   Information relayed to patient:  Message below regarding Vitamin D levels and the need to start a Vitamin D 50,000 units weekly and Vitamin D 2,000 units daily relayed to the patient. Patient also informed other lab results are good.  Patient has additional questions:  Yes  If YES, what are your questions/concerns:  Patient says she will start the Vitamin D 50,000 units but is concerned of possible side effects of constipation and/or diarrhea due to colon problems.  Patient is questioning if this should be a concern or if there any other side effects she should watch for?  Okay to leave a detailed message?: Yes

## 2021-05-30 NOTE — PATIENT INSTRUCTIONS - HE
Prolia 1st when approved by your insurance.  Prolia 2nd in 6 months. I will see you in 1 year.    DXA in 12/2019 in [place] .   Phone number to schedule [phone].    Daily calcium need is 7774-5270 mg a day from the diet and supplements.  Calcium citrate is easier to digest.  Vitamin D 2000 IU daily recommended.

## 2021-05-30 NOTE — TELEPHONE ENCOUNTER
Left voicemail for patient to return call to clinic. When patient returns call, please give them below message. Letter also sent.    Josefa Georges CMA ............... 11:07 AM, 07/12/19

## 2021-05-30 NOTE — TELEPHONE ENCOUNTER
----- Message from Jessica Butler MD sent at 7/10/2019  2:50 PM CDT -----  Call the pt and mail results.  Very low vit D. Rx for ergocalciferol is sent and pt also needs to take 2000 IU of vit D3 OTC daily. Other lab results are good.

## 2021-05-30 NOTE — TELEPHONE ENCOUNTER
Left detailed voicemail for patient with information below.  Josefa Georges CMA ............... 12:55 PM, 07/12/19

## 2021-05-31 VITALS — WEIGHT: 163.5 LBS | BODY MASS INDEX: 24.22 KG/M2 | HEIGHT: 69 IN

## 2021-05-31 NOTE — TELEPHONE ENCOUNTER
Writer left message for patient, stating that our manager would be taking a look into her concern when she returns next week.

## 2021-06-01 NOTE — TELEPHONE ENCOUNTER
Triage call:   Patient indicates that she thinks she has a UTI. Symptoms:  burning, bleeding- resolved when she increased her fluids, increased frequency. no fever or low back pain. Symptoms present since the weekend. Has been increasing her fluids but still having symptoms.      Triaged to be seen in the office- patient declines an appointment but is requesting that a UA/UC be ordered and she drops off a specimen. PCP please advise.     *Ok to leave a detailed message upon call back    Pharmacy and allergies verified.     Christal Raymundo RN [facility] Care Connection Triage/Med Refill 9/11/2019 11:38 AM    Reason for Disposition    Age > 50 years    Protocols used: URINATION PAIN - FEMALE-A-[place]

## 2021-06-01 NOTE — TELEPHONE ENCOUNTER
Test Results  Who is calling?:  Patient   Who ordered the test:  Dr. Persaud  Type of test: Lab  Date of test:  9/11/2019  Where was the test performed:  [facility]  What are your questions/concerns?:  Patient is calling requesting UA results.      Writer informed the patient of the message from Dr Persaud stating that the UA was normal.  Patient stated understanding.    Notes recorded by Harish Persaud MD on 9/11/2019 at 4:15 PM CDT  Please call patient urine looks okay, excellent    Okay to leave a detailed message?:  No need to return call.

## 2021-06-01 NOTE — TELEPHONE ENCOUNTER
Spoke with patient- she inquires about dropping specimen off in [place]- she doesn't want to come downtown today    Advised she could call the [facility] to set up lab appt-and we could send orders there.    She declines- she will come in this afternoon to leave UA

## 2021-06-02 VITALS — HEIGHT: 68 IN | WEIGHT: 165 LBS | BODY MASS INDEX: 25.01 KG/M2

## 2021-06-02 VITALS — HEIGHT: 68 IN | BODY MASS INDEX: 24.86 KG/M2 | WEIGHT: 164 LBS

## 2021-06-02 VITALS — WEIGHT: 177.1 LBS | HEIGHT: 68 IN | BODY MASS INDEX: 26.84 KG/M2

## 2021-06-02 NOTE — TELEPHONE ENCOUNTER
Spoke with patient and she explained her concerns. Pt indicated she always addresses her own corns and did not expect him to remove. Pt was there for orthotics and Dr Rivero just removed it without even discussing. I have forwarded to Customer [employer] and request a credit for one time for service billed.

## 2021-06-03 VITALS — WEIGHT: 168.9 LBS | BODY MASS INDEX: 26.06 KG/M2

## 2021-06-04 VITALS
DIASTOLIC BLOOD PRESSURE: 70 MMHG | WEIGHT: 176 LBS | BODY MASS INDEX: 26.67 KG/M2 | HEIGHT: 68 IN | SYSTOLIC BLOOD PRESSURE: 120 MMHG

## 2021-06-04 VITALS
TEMPERATURE: 98.3 F | OXYGEN SATURATION: 97 % | SYSTOLIC BLOOD PRESSURE: 100 MMHG | WEIGHT: 178 LBS | BODY MASS INDEX: 26.98 KG/M2 | HEART RATE: 76 BPM | HEIGHT: 68 IN | DIASTOLIC BLOOD PRESSURE: 60 MMHG

## 2021-06-04 VITALS
HEART RATE: 68 BPM | SYSTOLIC BLOOD PRESSURE: 105 MMHG | OXYGEN SATURATION: 97 % | RESPIRATION RATE: 16 BRPM | BODY MASS INDEX: 27.53 KG/M2 | WEIGHT: 178.38 LBS | TEMPERATURE: 98.4 F | DIASTOLIC BLOOD PRESSURE: 68 MMHG

## 2021-06-04 NOTE — TELEPHONE ENCOUNTER
Patient no longer has any questions. Nothing further needed.  Josefa Georges CMA ............... 11:06 AM, 12/16/19

## 2021-06-04 NOTE — TELEPHONE ENCOUNTER
Attempted to call but was not able to leave voicemail. Will try back at a later time.  Josefa Georges CMA ............... 2:36 PM, 12/09/19

## 2021-06-04 NOTE — TELEPHONE ENCOUNTER
Vidya is calling and would like another order for orthotic inserts.  Can you place another order or she is ok coming in for an apt.

## 2021-06-05 VITALS
OXYGEN SATURATION: 98 % | HEART RATE: 79 BPM | TEMPERATURE: 97.7 F | WEIGHT: 174 LBS | BODY MASS INDEX: 27.31 KG/M2 | SYSTOLIC BLOOD PRESSURE: 118 MMHG | DIASTOLIC BLOOD PRESSURE: 70 MMHG | HEIGHT: 67 IN

## 2021-06-05 VITALS — WEIGHT: 176 LBS | HEIGHT: 68 IN | BODY MASS INDEX: 26.67 KG/M2

## 2021-06-05 NOTE — PROGRESS NOTES
"S: Patient is a 70 y/o female, 5'7\", 168 lbs, seen at our [place] office for the evaluation and casting for custom foot orthotics on orders from Dr. Josefa DPM for the treatment of Dx: pronation deformity of both feet.     O: Patient presents with bilateral pes planus, pronation deformity, and slight valgus deformity of the ankle bilaterally. Patient c/o pain in both her feet throughout the day that is exacerbates by weight-bearing and ambulation. Patient ROM and MMT scores for foot and ankle bilaterally are all WNL.     G: Patient's custom foot orthotic will help to support, stabilize and correct the angular deformity of the patient's foot and ankle complex. Patient requires a custom solution as no OTS option will provide the necessary angular correction.     A: I took bilateral biofoam impression of the patient's feet for the fabrication of custom FOs. Patient's FOs will be fabricated using a base of copoly, mid layer of matte poron, and top cover of JERROD. Patient's FOs will also include a P-wing addition in order to help correct the angular deformity of the patient's foot and ankle complex. Patient requested sulcus length of her custom FOs as well.     P: Patient was asked to schedule her appointment for two weeks time as she was leaving our office.   "

## 2021-06-05 NOTE — PROGRESS NOTES
"S: Patient is a 70 y/o female, 5'7\", 168 lbs, seen at our [place] office for the evaluation and casting for custom foot orthotics on orders from Dr. Josefa DPM for the treatment of Dx: pronation deformity of both feet.     O: Patient presents with bilateral pes planus, pronation deformity, and slight valgus deformity of the ankle bilaterally. Patient c/o pain in both her feet throughout the day that is exacerbates by weight-bearing and ambulation. Patient ROM and MMT scores for foot and ankle bilaterally are all WNL.     G: Patient's custom foot orthotic will help to support, stabilize and correct the angular deformity of the patient's foot and ankle complex. Patient requires a custom solution as no OTS option will provide the necessary angular correction.     A: I did not need to trim the patient's FOs as they were sulcus trimlines. Patient was immediately happy with the fit and function of her FOs. Patient and I discussed care, use, and break in period associated with her new FOs. Patient reported that she understood all the instructions she was given and had no further questions.      P: Patient was asked to call our office if there are any issues with her FOs in the future.  "

## 2021-06-08 ENCOUNTER — RECORDS - HEALTHEAST (OUTPATIENT)
Dept: ADMINISTRATIVE | Facility: OTHER | Age: 71
End: 2021-06-08

## 2021-06-08 NOTE — TELEPHONE ENCOUNTER
Vidya calls in with diffuse symptoms of pain from her groin down her leg. Nothing that is on any protocol.  She was wondering if getting UTI but no symptoms of that either.  She has done a lot of [employer] work the past two days. She will rest from this work for a few days.  If symptoms worsen and she can state a clear issue she will call back with that.   She does have diverticulitis at times but nothing with that today as well.   Reason for Disposition    Information only question and nurse able to answer    Protocols used: INFORMATION ONLY CALL - NO TRIAGE-A-[place]

## 2021-06-09 NOTE — TELEPHONE ENCOUNTER
Patient calls today about pain in the L groin/pelvic area. This pain has been ongoing for a few months. She states pain is mild but is consistent. It is worse with exertion or twisting. Denies any urinary issues. Denies hematuria, denies cloudy urine. Denies pain with urination. Denies fevers.  Denies numbness/tingling of the area.     The patient requests to be seen since she's had this pain for awhile and it doesn't seem to be getting better. She does have hx of diverticulitis and wonders if it could be related to that. She denies any abdominal pain at this time. I recommended that patient set up office visit with PCP this week or next week. Warm transferred to scheduling.     Per scheduling patient's PCP Dr. Campos is not available until the end of July. Offered office visit with another provider but patient declines. Scheduled for office visit with PCP Dr. Campos 7/28/20.    Christy Ulloa RN      Reason for Disposition    [1] Pelvic pain is a chronic symptom (recurrent or ongoing AND [2] present > 4 weeks)    Additional Information    Negative: [1] SEVERE pelvic pain (e.g., excruciating) AND [2] vomiting    Negative: [1] SEVERE pelvic pain AND [2] present > 1 hour    Negative: SEVERE vaginal bleeding (i.e., soaking 2 pads or tampons per hour and present 2 or more hours)    Negative: Patient sounds very sick or weak to the triager    Negative: [1] MILD-MODERATE pain AND [2] constant AND [3] present > 2 hours    Negative: Fever > 103 F (39.4 C)    Negative: [1] Fever > 101 F (38.3 C) AND [2] age > 60    Negative: [1] Fever > 100.0 F (37.8 C) AND [2] bedridden (e.g., nursing home patient, CVA,chronic illness, recovering from surgery)    Negative: [1] Fever > 100.0 F (37.8 C) AND [2] diabetes mellitus or weak immune system (e.g., HIV positive, cancer chemo, splenectomy, organ transplant, chronic steroids)    Negative: [1] SEVERE pelvic pain AND [2] present < 1 hour    Protocols used: PELVIC PAIN -  FEMALE-A-AH

## 2021-06-09 NOTE — TELEPHONE ENCOUNTER
Pt calling for results of Lyme test.     Advised pt Lyme test was negative.     Pt concerned about false negative. Has an appointment tomorrow and will discuss.     Reason for Disposition    Caller requesting lab results    Protocols used: PCP CALL - NO TRIAGE-A-

## 2021-06-10 NOTE — PROGRESS NOTES
Office Visit - Follow up    Vidya Brink   70 y.o. female    Date of Visit: 7/28/2020    Chief Complaint   Patient presents with     Tick Removal     follow up tick bite     Fatigue     Groin Pain     left  feels bulge and pressure       Subjective: Groin pain left side.    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    Also history of diverticulosis followed by Dr. Vega from [facility] in addition hemorrhoids.    Groin pain with a bulge tender to touch.  Rule out inguinal or femoral hernia.    No blood in stool or urine medicines reviewed and listed generally well-tolerated weight up 13 pounds from previous.    Cellulitis resolving right side on Ceftin after tick bite.  This was a bullous type reaction.  Questionable Lyme disease.  Treated with Ceftin last 2 pills are remaining.  Area is improved right lateral thigh.  These bullae were large pictures of same reviewed.    ROS: A comprehensive review of systems was performed and was otherwise negative    Medications:  Prior to Admission medications    Medication Sig Start Date End Date Taking? Authorizing Provider   CALCIUM CARBONATE/VITAMIN D3 (CALCIUM 600 + D,3, ORAL) Take 1 tablet by mouth daily.   Yes Wes Campos MD   cefuroxime (CEFTIN) 500 MG tablet Take 1 tablet (500 mg total) by mouth 2 (two) times a day for 21 days. 7/9/20 7/30/20 Yes Travon Restrepo PA-C   ketoconazole (NIZORAL) 2 % cream APPLY CREAM TOPICALLY TWICE DAILY TO FEET 4/27/20  Yes PROVIDER, HISTORICAL       Allergies:   Allergies   Allergen Reactions     Fosamax [Alendronate] Swelling       Immunizations:   Immunization History   Administered Date(s) Administered     DT (pediatric) 05/01/1999, 05/06/1999     Influenza high dose,seasonal,PF, 65+ yrs 01/07/2016, 11/18/2017, 11/16/2019     Influenza, inj, historic,unspecified 01/06/2016     Influenza,seasonal quad, PF, =/> 6months 12/03/2018     Influenza,seasonal,quad inj =/> 6months  01/13/2015     Pneumo Conj 13-V (2010&after) 01/13/2015     Td,adult,historic,unspecified 07/17/2008     Tdap 07/17/2008     ZOSTER, LIVE 10/20/2010       Exam groin pain with tenderness and perhaps a bulge left side femoral or inguinal hernia.  Chest clear heart tones normal    100/60 pulse 76 respirations 18 O2 sats 97% temperature 98.3 degrees.    Assessment and Plan  Groin pain left side likely femoral or inguinal hernia offered x-ray of left hip plus CT scan of abdomen pelvis without contrast patient declined check CEA patient declined.  Recommend [facility] consultation with Dr. Travon Bartholomew at [phone]  [phone]  [phone].  May need surgical intervention.  For repair of same.  James syndrome with metachronous colon cancer followed by [facility] Dr. Vega presiding.    Diverticulosis coli with hemorrhoids.  High-fiber diet recommended followed by Dr. Vega.    Cellulitis right thigh improved after antibiotics Ceftin prescribed after tick bite.  Rule out Lyme disease.    Time: total time spent with the patient was 25 minutes of which >50% was spent in counseling and coordination of care    The following high BMI interventions were performed this visit: encouragement to exercise    Wes Campos MD    Patient Active Problem List   Diagnosis     Adenocarcinoid Tumor Of The Large Intestine     Osteoporosis     Dysphagia

## 2021-06-11 NOTE — ANESTHESIA POSTPROCEDURE EVALUATION
Patient: Vidya Brink  Procedure(s):  LAPAROSCOPIC LEFT INGUINAL HERNIA REPAIR, POSSIBLE OPEN (Left)  Anesthesia type: general    Patient location: PACU  Last vitals:   Vitals Value Taken Time   /63 9/17/2020  9:01 AM   Temp 36.2  C (97.2  F) 9/17/2020  8:39 AM   Pulse 49 9/17/2020  9:03 AM   Resp 16 9/17/2020  8:39 AM   SpO2 100 % 9/17/2020  9:03 AM   Vitals shown include unvalidated device data.  Post vital signs: stable  Level of consciousness: awake and responds to simple questions  Post-anesthesia pain: pain controlled  Post-anesthesia nausea and vomiting: no  Pulmonary: unassisted, return to baseline  Cardiovascular: stable and blood pressure at baseline  Hydration: adequate  Anesthetic events: no    QCDR Measures:  ASA# 11 - Marissa-op Cardiac Arrest: ASA11B - Patient did NOT experience unanticipated cardiac arrest  ASA# 12 - Marissa-op Mortality Rate: ASA12B - Patient did NOT die  ASA# 13 - PACU Re-Intubation Rate: ASA13B - Patient did NOT require a new airway mgmt  ASA# 10 - Composite Anes Safety: ASA10A - No serious adverse event    Additional Notes:

## 2021-06-11 NOTE — ANESTHESIA CARE TRANSFER NOTE
Last vitals:   Vitals:    09/17/20 0803   BP: 121/59   Pulse: 77   Resp: 16   Temp: 36.1  C (97  F)   SpO2: 99%     Patient's level of consciousness is drowsy  Spontaneous respirations: yes  Maintains airway independently: yes  Dentition unchanged: yes  Oropharynx: oral airway in place    QCDR Measures:  ASA# 20 - Surgical Safety Checklist: WHO surgical safety checklist completed prior to induction    PQRS# 430 - Adult PONV Prevention: 4558F - Pt received => 2 anti-emetic agents (different classes) preop & intraop  ASA# 8 - Peds PONV Prevention: NA - Not pediatric patient, not GA or 2 or more risk factors NOT present  PQRS# 424 - Marissa-op Temp Management: 4559F - At least one body temp DOCUMENTED => 35.5C or 95.9F within required timeframe  PQRS# 426 - PACU Transfer Protocol: - Transfer of care checklist used  ASA# 14 - Acute Post-op Pain: ASA14B - Patient did NOT experience pain >= 7 out of 10

## 2021-06-11 NOTE — TELEPHONE ENCOUNTER
Spoke with Vidya today to schedule her surgery with Dr. Carrillo. Went over the following details with Vidya:    We've received instruction to get you scheduled for left inguinal hernia repair with Dr Carrillo. We have that arranged as follows:     Surgery Date: Thursday September 17th    Location: [facility]                 3rd Floor, [facility]                  [address] [place] [id number]     Arrival Time: 6:30 AM (unless instructed otherwise by the pre-op nurse)    Prep:     1. Schedule a preop physical with your primary care doctor. This may be virtual or face-to-face depending on your doctors preference. Call them right away to schedule this.    2. COVID19 testing is required within 4 days of surgery. We have this scheduled for you at [facility], [address] [place] [address] on Monday Sept. 14th at 10:10 AM. Follow the specific instructions you receive by Blake. If your test is positive, your surgery will be canceled.     3. Nothing to eat or drink for 8 hours before surgery unless instructed differently by the preop nurse.    4. No blood thinners including aspirin for one week prior to surgery. Verify this is safe for you with your primary care doctor before stopping.     5. You need an adult to drive you home and stay with you 24 hours after surgery. Because of COVID19 related visitor restrictions, surgical patients are allowed one visitor only during the preoperative phase of surgery.    6. When you arrive to the facility, you will be screened for COVID19 symptoms. If you screen positive, your surgery will need to be postponed for your safety.    7. If the community sees a new surge in COVID19 hospital admissions, your procedure may need to be postponed. We will contact you if this happens.    8. We always encourage you to notify your insurance any time you have something scheduled including surgery. The number is  usually right on the back of your insurance card.     Vidya verbalized understanding and will prepare for surgery.   Vidya mentioned that she would like to switch her procedure from an OPEN repair to Laparoscopic surgery. Dr. Carrillo was informed and will create a modification case request.     [facility], General Surgery  Surgery Scheduler  [phone] (Direct Line)  [phone] (Main Line)

## 2021-06-11 NOTE — PROGRESS NOTES
Office Visit - Physical    Vidya Brink   70 y.o. female    Date of Visit: 9/11/2020    Chief Complaint   Patient presents with     Pre-op Exam     Laparoscopic left inguinal hernia repair  Dr. Donny Carrillo at [facility] OR  [phone]       Subjective: Preoperative examination in anticipation of surgery for left inguinal hernia repair possible open with Dr. Donny Carrillo [facility] OR on Thursday, September 17, 2020.  The patient notes for the last 18 months a hernia in the left groin area it is reducible it is more prominent before a bowel movement after bowel movement she can push it back.  She has been seen by Dr. Carrillo and one other surgeon.  Laparoscopic surgery is planned.  But open is possible.  The patient is allergic to Fosamax.  She does not smoke she uses alcohol a light social basis.    ROS: A comprehensive review of systems was performed and was otherwise negative    Medications:   Prior to Admission medications    Medication Sig Start Date End Date Taking? Authorizing Provider   CALCIUM CARBONATE/VITAMIN D3 (CALCIUM 600 + D,3, ORAL) Take 1 tablet by mouth daily.   Yes Wes Campos MD   cholecalciferol, vitamin D3, (VITAMIN D3) 100 mcg (4,000 unit) cap Take by mouth.   Yes PROVIDER, HISTORICAL   ketoconazole (NIZORAL) 2 % cream APPLY CREAM TOPICALLY TWICE DAILY TO FEET 4/27/20  Yes PROVIDER, HISTORICAL       Allergies:  Allergies   Allergen Reactions     Fosamax [Alendronate] Swelling       Immunizations:   Immunization History   Administered Date(s) Administered     DT (pediatric) 05/01/1999, 05/06/1999     INFLUENZA,SEASONAL QUAD, PF, =/> 6months 12/03/2018     Influenza high dose,seasonal,PF, 65+ yrs 01/07/2016, 11/18/2017, 11/16/2019     Influenza, inj, historic,unspecified 01/06/2016     Influenza,seasonal,quad inj =/> 6months 01/13/2015     Pneumo Conj 13-V (2010&after) 01/13/2015     Td,adult,historic,unspecified 07/17/2008     Tdap 07/17/2008     ZOSTER, LIVE 10/20/2010        Health Maintenance: Immunizations reviewed and up-to-date periodic colonoscopies and flexible sigmoidoscopies are done after 2 prior colon cancer surgeries by Dr. Vega of [facility].  There is been no sign of recurrence of colon cancer which she has had twice.  The patient has had a mammogram negative according to our chart 2019 patient reports it was done in 2019.  The patient is also been diagnosed with osteoporosis as has been seen by Dr. Garvin from osteoporosis services previously.  She is sensitive or allergic to Fosamax.        Past Medical History: James syndrome with recurrent colon cancer 2 prior colon cancer surgeries have been done with only 14 inches of her colon remaining periodic colonoscopies or flex sigs are done by Dr. Vega of [facility].  The patient has had a hysterectomy complete with history of endometriosis.    Past Surgical History: Hysterectomy complete for endometriosis no cancer.    2 colon cancer surgeries have been done and only 14 inches of her colon remain.  She has the James syndrome.  No anesthetic problems with any prior surgeries.    Right shoulder rotator cuff surgery 2019.    Family History: No family history of malignant hyperthermia associated with general anesthesia.    Father  colon cancer age 73.    Paternal cousins with colon cancer.    2 sisters with colon cancer.    1 son developed colon cancer at the young age of 28 with subtotal colectomy.    1 daughter is well registered nurse negative for the James syndrome.     now retired  recent fall left him with a fractured arm.   is also had atrial fibrillation.    Mother  of this patient age 98.    Social History:  by profession.  Practices independently.    Exam Chest clear to auscultation and percussion.  Heart tones regular rhythm without murmur rub or gallop.  Abdomen soft nontender no organomegaly.  No peritoneal signs.  Extremities free  of edema cyanosis or clubbing.  Neck veins nondistended no thyromegaly or scleral icterus noted, carotids full.  Skin warm and dry easily conversant good spirited.  Normal intelligence.  Neurologically intact no gross localizing findings.    Slightly pale in color not in acute distress 67-1/4 inches tall 174 pounds 4 pounds less than previous.  BMI 27.    118/70 respiratory rate 18 unlabored pulse 80 O2 sats room air 98% temperature 97.7.    Electrocardiogram done preoperatively showed sinus mechanism rate 72 normal EKG.  Hemogram comprehensive metabolic profile pending    Assessment and Plan  Medical acceptable risk for anticipated surgery laparoscopic left inguinal hernia repair with possible open with Dr. Donny Carrillo on September 17, 2020.  Hemogram conference of metabolic profile ordered pending electrocardiogram reviewed sinus mechanism rate 72 normal EKG.    Total time spent with the patient today was 40 minutes of which greater than 50% was spent in counseling and coordination of care.    Wes Campos MD    Patient Active Problem List   Diagnosis     Adenocarcinoid Tumor Of The Large Intestine     Osteoporosis     Dysphagia     Left inguinal hernia

## 2021-06-11 NOTE — PROGRESS NOTES
Office Visit - Physical    Vidya Brink   67 y.o. female    Date of Visit: 7/7/2017    Chief Complaint   Patient presents with     Medicare Annual Wellness Visit Subsequent     pt is fasting       Subjective: Physical exam.    67-year-old  with James syndrome here for physical exam.    Rare alcohol non-smoker.  Allergies alendronate or Fosamax.    Consulting gastroenterologist now Dr. Wes Vega from [facility] recent colon examination all clear patient has had 2 colon surgeries with 13 or 14 inches of her colon remaining allCLEAR on recent exam this year 2017 with Dr. Vega at [facility].    The patient has a gynecologist Dr. Anny Musa who has appointed a new gynecologist for her.  The patient has had mammogram allCLEAR patient is also part of a ovarian cancer screening in light of her James syndrome.  Recent pelvic or vaginal ultrasound allCLEAR MRI scan also suggested by her gynecologist no gynecologist forthcoming with exam encouraged by this examiner not repeated today.    ROS: A comprehensive review of systems was performed and was otherwise negative    Medications:   Prior to Admission medications    Medication Sig Start Date End Date Taking? Authorizing Provider   CALCIUM CARBONATE/VITAMIN D3 (CALCIUM 600 + D,3, ORAL) Take 1 tablet by mouth daily.   Yes Wes Campos MD   cholecalciferol, vitamin D3, 400 unit cap Take 1 capsule by mouth 2 (two) times a day. 1/13/15  Yes Wes Campos MD   ketoconazole (NIZORAL) 2 % cream  4/12/16  Yes PROVIDER, HISTORICAL       Allergies:  Allergies   Allergen Reactions     Alendronate        Immunizations:   Immunization History   Administered Date(s) Administered     DT (pediatric) 05/01/1999, 05/06/1999     Influenza, inj, historic 01/06/2016     Influenza, seasonal,quad inj 36+ mos 01/13/2015     Pneumo Conj 13-V (2010&after) 01/13/2015     Td, historic 07/17/2008     Tdap 07/17/2008     ZOSTER 10/20/2010       Health  Maintenance: Immunizations reviewed and up-to-date.:  Examinations and mammograms allCLEAR this year.    Past Medical History: James syndrome history of colon cancer with 13 inches of her colon remaining after prior rectal and colon surgery ×2.    Endometriosis status post laparoscopy for same.    Osteopenia with history of allergy to Fosamax joint swelling.    Past Surgical History: Colon cancer surgery with 2 colon surgeries with now only 13 or 14 inches of her colon remaining.  Recent colonoscopy allCLEAR.    Family History: Father  colon cancer age 73 James syndrome.  One son with colon cancer age 28 living and well free of disease.    One daughter well.  Mother  98.   also patient of this examiner with atrial fibrillation [place].    Social History:  [employer].    Exam Chest clear to auscultation and percussion.  Heart tones regular rhythm without murmur rub or gallop.  Abdomen soft nontender no organomegaly.  No peritoneal signs.  Extremities free of edema cyanosis or clubbing.  Neck veins nondistended no thyromegaly or scleral icterus noted, carotids full.  Skin warm and dry easily conversant good spirited.  Normal intelligence.  Neurologically intact no gross localizing findings.  Rest of examination negative including skin negative lymph negative neuro negative psych normal HEENT negative small cataract developing right eye she wears glasses good pulses noted in all 4 extremities breast pelvic and rectal examination to be done by her consulting gynecologist who is being appointed or recommended by Dr. Luly Musa now retired gynecologist.  Mammograms have been reportedly clear and: Examination with Dr. Wes Vega of [facility] allCLEAR this year 2017.    Assessment and Plan  General medical examination at healthcare facility in [place]  age 67 with history of James syndrome and colon cancer.  Strongly encouraged close watch of breast as well as ovary and  remaining colon for this patient who is at risk for malignancies in these 3 areas at least.  Patient understands CEA to be done along with hemogram comprehensive metabolic profile urinalysis lipid panel and TSH level today.  Osteopenia with allergy to Fosamax joint swelling.    The following high BMI interventions were performed this visit: encouragement to exercise    Wes Campos MD    Patient Active Problem List   Diagnosis     Adenocarcinoid Tumor Of The Large Intestine     Community-acquired Pneumonia     Osteoporosis     Abnormal Blood Chemistry     Dysphagia     Headache     Palpitations     Osteopenia     Health maintenance examination     Otalgia of right ear     Ceruminosis

## 2021-06-11 NOTE — PROGRESS NOTES
Hernia education & surgery packet provided to pt.    Gabby Martin CMA ([facility])

## 2021-06-12 NOTE — PROGRESS NOTES
HPI: Pt is here for follow up s/p LAPAROSCOPIC LEFT INGUINAL HERNIA REPAIR, with Dr. Carrillo on 9/17/20.   she is doing well.  Pain is well controlled:  Yes. No difficulties with the surgical wound/wounds.  she is eating well and denies fever and chills.        LMP 11/11/2001     EXAM:  GENERAL:Appears well  ABDOMEN:  Soft, +BS  SURGICAL WOUNDS:  Incisions healing well, no induration or drainage.      Assessment/Plan: Doing well after surgery and should follow up as needed.    Raul Lujan PA-C  [phone]  General Surgery

## 2021-06-12 NOTE — TELEPHONE ENCOUNTER
Spoke to patient. She would like to be rescheduled. Please schedule her with Raul this Thursday at 12 noon  Thank you

## 2021-06-15 ENCOUNTER — RECORDS - HEALTHEAST (OUTPATIENT)
Dept: BONE DENSITY | Facility: CLINIC | Age: 71
End: 2021-06-15

## 2021-06-15 DIAGNOSIS — Z78.0 ASYMPTOMATIC MENOPAUSAL STATE: ICD-10-CM

## 2021-06-15 DIAGNOSIS — M81.0 AGE-RELATED OSTEOPOROSIS WITHOUT CURRENT PATHOLOGICAL FRACTURE: ICD-10-CM

## 2021-06-17 NOTE — PATIENT INSTRUCTIONS - HE
Patient Instructions by Wes Campos MD at 1/29/2019  9:00 AM     Author: Wes Campos MD Service: -- Author Type: Physician    Filed: 1/29/2019  9:28 AM Encounter Date: 1/29/2019 Status: Signed    : Wes Campos MD (Physician)         Patient Education   Preventing Falls in the Home  As you get older, falls are more likely. Thats because your reaction time slows. Your muscles and joints may also get stiffer, making them less flexible. Illness, medications, and vision changes can also affect your balance. A fall could leave you unable to live on your own. To make your home safer, follow these tips:    Floors    Put nonskid pads under area rugs.    Remove throw rugs.    Replace worn floor coverings.    Tack carpets firmly to each step on carpeted stairs. Put nonskid strips on the edges of uncarpeted stairs.    Keep floors and stairs free of clutter and cords.    Arrange furniture so there are clear pathways.    Clean up any spills right away.    Bathrooms    Install grab bars in the tub or shower.    Apply nonskid strips or put a nonskid rubber mat in the tub or shower.    Sit on a bath chair to bathe.    Use bathmats with nonskid backing.    Lighting    Keep a flashlight in each room.    Put a nightlight along the pathway between the bedroom and the bathroom.    6484-4657 [employer]. [address] [place] [address]. All rights reserved. This information is not intended as a substitute for professional medical care. Always follow your healthcare professional's instructions.           Advance Directive  Patients advance directive was discussed and I am comfortable with the patients wishes.  Patient Education   Personalized Prevention Plan  You are due for the preventive services outlined below.  Your care team is available to assist you in scheduling these services.  If you have already completed any of these items, please share that information with your care  team to update in your medical record.  Health Maintenance   Topic Date Due   ? COLONOSCOPY  04/22/1999   ? ZOSTER VACCINES (2 of 3) 12/15/2010   ? PNEUMOCOCCAL POLYSACCHARIDE VACCINE AGE 65 AND OVER  04/22/2014   ? TD 18+ HE  07/17/2018   ? DXA SCAN  12/04/2019   ? FALL RISK ASSESSMENT  01/03/2020   ? MAMMOGRAM  01/02/2021   ? ADVANCE DIRECTIVES DISCUSSED WITH PATIENT  01/29/2024   ? INFLUENZA VACCINE RULE BASED  Completed   ? PNEUMOCOCCAL CONJUGATE VACCINE FOR ADULTS (PCV13 OR PREVNAR)  Completed

## 2021-06-18 NOTE — PATIENT INSTRUCTIONS - HE
Patient Instructions by Shea Ricks CNP at 7/11/2020 12:00 PM     Author: Shea Ricks CNP Service: -- Author Type: Nurse Practitioner    Filed: 7/11/2020  1:13 PM Encounter Date: 7/11/2020 Status: Signed    : Shea Ricks CNP (Nurse Practitioner)         Patient Education     Wound Check, Infection  You have a wound that has become infected. The wound will not heal properly unless the infection is cleared. Infection in a wound may also spread if it is not treated. In most cases, antibiotic medicines are prescribed to treat a wound infection.   Symptoms of a wound infection include:    Redness or swelling around the wound    Warmth coming from the wound    New or worsening pain    Red streaks around the wound    Draining pus    Fever  Home care  Follow all directions you are given to treat the infection.  Medicines  Take all medicines as prescribed.     If you were given antibiotics, take them until they are gone or your healthcare provider tells you to stop. It is vital to finish the antibiotics even if you feel better. If you do not finish them, the infection may come back and be harder to treat.    If your infection is not responding to the medicines you are taking, you may be prescribed new medicines.    Take medicine for pain as directed by your healthcare provider.  Wound care  Care for your wound as directed by your healthcare provider.    Apply a warm compress (clean cloth soaked in hot water) to the infected area for about 5 to 10 minutes at a time. Be very careful not to burn yourself. Test the cloth on a non-infected area to make sure it is not too hot.    Continue to change the dressing daily. If it becomes wet, stained with wound fluid, or dirty, change it sooner. To change it:  ? Wash your hands with soap and water before changing the dressing.  ? Carefully remove the dressing and tape. If it sticks to the wound, you may need to wet it a little to remove it. (Do not do this if  your healthcare provider has told you not to.)  ? Gently clean the wound with clean water (or saline) using gauze, a clean washcloth, or cotton swab.  ? Do not use soap, alcohol, peroxide or other cleansers.  ? If you were told to dry the wound before putting on a new dressing, gently pat. Do not rub.  ? Throw out the old dressing.  ? Wash your hands again before opening the new, clean dressing.  ? Wash your hands again when you are done.  Follow-up care  Follow up with your healthcare provider as advised. If a culture was done, you will be notified if your treatment needs to change. Call as directed for the results.  When to seek medical advice  Call your health care provider right away if any of these occur:    Symptoms of infection don't start to improve within 2 days of starting antibiotics    Symptoms of infection get worse    New symptoms, such as red streaks around the wound    Fever of 100.4 F (38.0 C) or higher for more than 2 days after starting the antibiotics  Date Last Reviewed: 8/10/2015    7676-0791 [employer]. [address] [place] [address]. All rights reserved. This information is not intended as a substitute for professional medical care. Always follow your healthcare professional's instructions.

## 2021-06-18 NOTE — PATIENT INSTRUCTIONS - HE
Patient Instructions by Travon Restrepo PA-C at 7/9/2020  4:50 PM     Author: Travon Restrepo PA-C Service: -- Author Type: Physician Assistant    Filed: 7/9/2020  6:13 PM Encounter Date: 7/9/2020 Status: Addendum    : Travon Restrepo PA-C (Physician Assistant)    Related Notes: Original Note by Travon Restrepo PA-C (Physician Assistant) filed at 7/9/2020  6:13 PM       You have an infection at the tick bite site that may actually be infection from bacterial source but may also be consistent with Lyme disease.  The antibiotic that is given will cover for both skin infection and Lyme disease.  You are given a 3-week course of treatment.  Return to the walk-in care in 2 days to ensure that you are improving with your symptoms and not getting any worse over the next 48 hours.  If you should start developing fever chills night sweats fatigue or worsening symptoms return to the emergency room.  Follow-up with your primary care provider for reevaluation next week to see how you are improving with your symptoms.      Patient Education     Lyme Disease  Lyme disease is caused by bacteria. The infection is most often passed during the bite of a deer tick. The tick is very small, so many people with Lyme disease do not know they have been bitten. Tests for Lyme disease are not always accurate early in the disease. If the disease is suspected, treatment may begin before testing confirms the infection. A long course of antibiotics is the standard treatment.  If untreated, Lyme disease can worsen and full-body symptoms can develop          Early local symptoms may appear within a few days to a month after the tick bite. These symptoms may include a round, red rash that looks like a bull's-eye target with darker outer ring and a darker center. There may fever, chills, fatigue, body aches, and headache. In time, the rash goes away, even without treatment. That doesn't mean the infection has gone away, however. In some cases, early  local symptoms never develop.    Early disseminated symptoms may appear weeks to months after the bite. These can include muscle aches, fatigue, fever, headache, stiff neck, and joint pain and swelling.    Late-stage symptoms include weakness in an arm, leg or one side of the face, headache, fever, and numbness and tingling in the arms or legs, confusion, and memory loss.  Testing is done for the presence of the bacteria. When the infection is treated early, it can be cured. In some cases, a second or third course of antibiotics may be needed. Be sure to follow your healthcare providers directions about treatment.  Home care  If oral antibiotics have been prescribed, take them exactly as directed until they are completely gone. Do not stop taking them until you have taken the full course or your healthcare provider has told you to stop.  Ask your healthcare provider about taking over-the-counter medicines to control symptoms such as aches and fever.  Follow-up care  Follow up with your healthcare provider as advised. Be sure to return for follow-up testing as directed to be sure the infection has been treated.  When to seek medical advice  Call your healthcare provider right away if any of the following occur:    Current symptoms get worse    Unexplained fever, neck pain or stiffness, or headache    Arm, leg or facial weakness    Joint pain or swelling    Numbness and tingling in the arms or legs    Confusion or memory loss    Irregular or rapid heartbeat  Date Last Reviewed: 9/25/2015 2000-2017 [employer]. [address] [place] [address]. All rights reserved. This information is not intended as a substitute for professional medical care. Always follow your healthcare professional's instructions.           Patient Education     Discharge Instructions for Cellulitis  You have been diagnosed with cellulitis. This is an infection in the deepest layer of the skin and tissue beneath the skin.  In some cases, the infection also affects the muscle. Cellulitis is caused by bacteria. The bacteria can enter the body through broken skin. This can happen with a cut, scratch, animal bite, or an insect bite that has been scratched. You may have been treated in the hospital with antibiotics and fluids. You will likely be given a prescription for antibiotics to take at home. This sheet will help you take care of yourself at home.  Home care  When you are home:    Take the prescribed antibiotic medicine you are given as directed until it is gone. Take it even if you feel better. It treats the infection and stops it from returning. Not taking all the medicine can make future infections hard to treat.    Keep the infected area clean.    When possible, raise the infected area above the level of your heart. This helps keep swelling down.    Talk with your healthcare provider if you are in pain. Ask what kind of over-the-counter medicine you can take for pain.    Apply clean bandages as advised.    Take your temperature once a day for a week.    Wash your hands often to prevent spreading the infection.  In the future, wash your hands before and after you touch cuts, scratches, or bandages. This will help prevent infection.   When to call your healthcare provider  Call your healthcare provider right away if you have any of the following:    Trouble or pain when moving the joints above or below the infected area    Discharge or pus draining from the area    Fever of 100.4 F (38 C) or higher, or as directed by your healthcare provider    Pain that gets worse in or around the infected     Redness that gets worse in or around the infected area, particularly if the area of redness expands to a wider area    Shaking chills    Swelling of the infected area    Vomiting  Date Last Reviewed: 8/1/2016 2000-2019 [employer]. [address] [place] [address], [place] [address]. All rights reserved. This information is not  intended as a substitute for professional medical care. Always follow your healthcare professional's instructions.

## 2021-06-18 NOTE — LETTER
Letter by Wes Campos MD at      Author: Wes Campos MD Service: -- Author Type: --    Filed:  Encounter Date: 1/4/2019 Status: (Other)       Vidya Brink  [address]  [place] [address]             January 4, 2019         Dear Ms. Brink,    Below are the results from your recent visit:    Resulted Orders   HM2(CBC w/o Differential)   Result Value Ref Range    WBC 5.0 4.0 - 11.0 thou/uL    RBC 4.27 3.80 - 5.40 mill/uL    Hemoglobin 12.5 12.0 - 16.0 g/dL    Hematocrit 37.8 35.0 - 47.0 %    MCV 89 80 - 100 fL    MCH 29.2 27.0 - 34.0 pg    MCHC 33.0 32.0 - 36.0 g/dL    RDW 12.1 11.0 - 14.5 %    Platelets 194 140 - 440 thou/uL    MPV 9.3 7.0 - 10.0 fL   Comprehensive Metabolic Panel   Result Value Ref Range    Sodium 142 136 - 145 mmol/L    Potassium 3.9 3.5 - 5.0 mmol/L    Chloride 105 98 - 107 mmol/L    CO2 27 22 - 31 mmol/L    Anion Gap, Calculation 10 5 - 18 mmol/L    Glucose 122 70 - 125 mg/dL    BUN 20 8 - 22 mg/dL    Creatinine 0.72 0.60 - 1.10 mg/dL    GFR MDRD Af Amer >60 >60 mL/min/1.73m2    GFR MDRD Non Af Amer >60 >60 mL/min/1.73m2    Bilirubin, Total 1.4 (H) 0.0 - 1.0 mg/dL    Calcium 9.4 8.5 - 10.5 mg/dL    Protein, Total 6.6 6.0 - 8.0 g/dL    Albumin 3.6 3.5 - 5.0 g/dL    Alkaline Phosphatase 85 45 - 120 U/L    AST 18 0 - 40 U/L    ALT 14 0 - 45 U/L    Narrative    Fasting Glucose reference range is 70-99 mg/dL per  American Diabetes Association (ADA) guidelines.       All very good results    Please call with questions or contact us using [employer].    Sincerely,        Electronically signed by Wes Campos MD

## 2021-06-18 NOTE — LETTER
Letter by Wes Campos MD at      Author: Wes Campos MD Service: -- Author Type: --    Filed:  Encounter Date: 1/30/2019 Status: (Other)       Vidya Brink  [address]  [place] [address]             January 30, 2019         Dear Ms. Brink,    Below are the results from your recent visit:    Resulted Orders   HM2(CBC w/o Differential)   Result Value Ref Range    WBC 4.0 4.0 - 11.0 thou/uL    RBC 4.34 3.80 - 5.40 mill/uL    Hemoglobin 12.3 12.0 - 16.0 g/dL    Hematocrit 40.2 35.0 - 47.0 %    MCV 93 80 - 100 fL    MCH 28.3 27.0 - 34.0 pg    MCHC 30.6 (L) 32.0 - 36.0 g/dL    RDW 12.7 11.0 - 14.5 %    Platelets 194 140 - 440 thou/uL    MPV 13.3 (H) 8.5 - 12.5 fL   Comprehensive Metabolic Panel   Result Value Ref Range    Sodium 143 136 - 145 mmol/L    Potassium 3.5 3.5 - 5.0 mmol/L    Chloride 106 98 - 107 mmol/L    CO2 27 22 - 31 mmol/L    Anion Gap, Calculation 10 5 - 18 mmol/L    Glucose 79 70 - 125 mg/dL    BUN 18 8 - 22 mg/dL    Creatinine 0.64 0.60 - 1.10 mg/dL    GFR MDRD Af Amer >60 >60 mL/min/1.73m2    GFR MDRD Non Af Amer >60 >60 mL/min/1.73m2    Bilirubin, Total 1.2 (H) 0.0 - 1.0 mg/dL    Calcium 9.4 8.5 - 10.5 mg/dL    Protein, Total 6.5 6.0 - 8.0 g/dL    Albumin 3.7 3.5 - 5.0 g/dL    Alkaline Phosphatase 85 45 - 120 U/L    AST 17 0 - 40 U/L    ALT 14 0 - 45 U/L    Narrative    Fasting Glucose reference range is 70-99 mg/dL per  American Diabetes Association (ADA) guidelines.   Lipid Cascade   Result Value Ref Range    Cholesterol 170 <=199 mg/dL    Triglycerides 68 <=149 mg/dL    HDL Cholesterol 74 >=50 mg/dL    LDL Calculated 82 <=129 mg/dL    Patient Fasting > 8hrs? Yes    Thyroid Stimulating Hormone (TSH)   Result Value Ref Range    TSH 2.07 0.30 - 5.00 uIU/mL   Urinalysis-UC if Indicated   Result Value Ref Range    Color, UA Yellow Colorless, Yellow, Straw, Light Yellow    Clarity, UA Clear Clear    Glucose, UA Negative Negative    Bilirubin, UA Negative Negative     Ketones, UA Negative Negative    Specific Gravity, UA 1.010 1.001 - 1.030    Blood, UA Negative Negative    pH, UA 6.0 4.5 - 8.0    Protein, UA Negative Negative mg/dL    Urobilinogen, UA <2.0 E.U./dL <2.0 E.U./dL, 2.0 E.U./dL    Nitrite, UA Negative Negative    Leukocytes, UA Trace (!) Negative    Bacteria, UA None Seen None Seen hpf    RBC, UA 0-2 None Seen, 0-2 hpf    WBC, UA 0-5 None Seen, 0-5 hpf    Squam Epithel, UA 0-5 None Seen, 0-5 lpf    Mucus, UA Few (!) None Seen lpf    Ca Oxalate Mairangel, UA Present (!) None Seen    Narrative    Urine Culture ordered based on [facility] criteria   CEA (Carcinoembryonic Antigen)   Result Value Ref Range    CEA 0.9 0.0 - 3.0 ng/mL    Narrative    Method is Abbott Carcinoembryonic Antigen (CEA) using  Chemiluminescent Microparticle Immunoassay.  WHO 1st International  Std (73/601).    Smoking may increase values.  Increased levels may be seen in patients with primary  colorectal cancer or other malignancies including  breast, gastrointestinal tract, liver, ovarian,  pancreatic, and prostatic cancers. CEA may be useful  in monitoring patients with known malignancies.    Serum markers are not specific for malignancy and values  may vary by method.       All very good results.    Please call with questions or contact us using [employer].    Sincerely,        Electronically signed by Wes Campos MD

## 2021-06-19 NOTE — LETTER
Letter by Jessica Butler MD at      Author: Jessica Butler MD Service: -- Author Type: --    Filed:  Encounter Date: 7/10/2019 Status: (Other)         Vidya Brink  [address]  [facility] [address]      July 12, 2019      Dear Ms. [place],    Your recent vitamin D level was very low at 16.2 ng/mL. Dr. Butler sent in a prescription for ergocalciferol (Vitamin D) 50,000 U, take one capsule once weekly until gone. In addition to this, she would like you to take Vitamin D3 2000 U daily. You can buy this over the counter. Please let us know if you have any further questions.    Thank you,      [facility] Staff

## 2021-06-19 NOTE — LETTER
Letter by Jessica Butler MD at      Author: Jessica Butler MD Service: -- Author Type: --    Filed:  Encounter Date: 7/10/2019 Status: (Other)         Vidya Brink  [address]  [place] [address]             July 10, 2019         Dear Ms. Brink,    Below are the results from your recent visit:    Resulted Orders   Calcium, 24 Hour Urine   Result Value Ref Range    Calcium, 24H Urine 284 (H) 20 - 275 mg/24hr      Comment:        Hypercalciuria >350  Values are for persons with  average daily calcium intake  (600-800 mg/day)    24H Urine Volume 2,625 mL    Calcium, Urine 10.8 mg/dL       Stable result.     Please call with questions or contact us using [employer].    Sincerely,        Electronically signed by Jessica Butler MD

## 2021-06-20 NOTE — LETTER
Letter by Wes Campos MD at      Author: Wes Campos MD Service: -- Author Type: --    Filed:  Encounter Date: 9/14/2020 Status: (Other)         Vidya Brink  [address]  [place] [address]             September 14, 2020         Dear Ms. Brink,    Below are the results from your recent visit:    Resulted Orders   HM2(CBC w/o Differential)   Result Value Ref Range    WBC 4.5 4.0 - 11.0 thou/uL    RBC 4.30 3.80 - 5.40 mill/uL    Hemoglobin 12.7 12.0 - 16.0 g/dL    Hematocrit 38.3 35.0 - 47.0 %    MCV 89 80 - 100 fL    MCH 29.5 27.0 - 34.0 pg    MCHC 33.1 32.0 - 36.0 g/dL    RDW 13.1 11.0 - 14.5 %    Platelets 167 140 - 440 thou/uL    MPV 10.1 (H) 7.0 - 10.0 fL   Comprehensive Metabolic Panel   Result Value Ref Range    Sodium 140 136 - 145 mmol/L    Potassium 4.0 3.5 - 5.0 mmol/L    Chloride 104 98 - 107 mmol/L    CO2 27 22 - 31 mmol/L    Anion Gap, Calculation 9 5 - 18 mmol/L    Glucose 81 70 - 125 mg/dL    BUN 11 8 - 28 mg/dL    Creatinine 0.68 0.60 - 1.10 mg/dL    GFR MDRD Af Amer >60 >60 mL/min/1.73m2    GFR MDRD Non Af Amer >60 >60 mL/min/1.73m2    Bilirubin, Total 0.7 0.0 - 1.0 mg/dL    Calcium 9.3 8.5 - 10.5 mg/dL    Protein, Total 6.7 6.0 - 8.0 g/dL    Albumin 3.8 3.5 - 5.0 g/dL    Alkaline Phosphatase 73 45 - 120 U/L    AST 15 0 - 40 U/L    ALT 10 0 - 45 U/L    Narrative    Fasting Glucose reference range is 70-99 mg/dL per  American Diabetes Association (ADA) guidelines.       All very good results     Please call with questions or contact us using [employer].    Sincerely,        Electronically signed by Wes Campos MD

## 2021-06-22 NOTE — PROGRESS NOTES
Office Visit - Physical    Vidya Brink   68 y.o. female    Date of Visit: 1/3/2019    Chief Complaint   Patient presents with     Pre-op Exam     Right shoulder  Dr. Pedro Cantrell at [facility] on 1/07/2019       Subjective: Preoperative examination in anticipation of right shoulder surgery Monday next January 7, 2009 with Dr. ROHITH Cantrell at [facility] [place].    Rotator cuff tear 2 of the 4 tendons are torn.  Positive MRI scan.    Non-smoker.    Light social alcohol.    Allergy Fosamax and stiff joints.  History of osteoporosis.  Previously seen by Dr. Garvin in [facility] as an osteoporosis specialist.    ROS: A comprehensive review of systems was performed and was otherwise negative    Medications:   Prior to Admission medications    Medication Sig Start Date End Date Taking? Authorizing Provider   CALCIUM CARBONATE/VITAMIN D3 (CALCIUM 600 + D,3, ORAL) Take 1 tablet by mouth daily.   Yes Wes Campos MD   cholecalciferol, vitamin D3, 400 unit cap Take 1 capsule by mouth 2 (two) times a day. 1/13/15  Yes Wes Campos MD   ketoconazole (NIZORAL) 2 % cream  4/12/16   PROVIDER, HISTORICAL       Allergies:  Allergies   Allergen Reactions     Alendronate        Immunizations:   Immunization History   Administered Date(s) Administered     DT (pediatric) 05/01/1999, 05/06/1999     Influenza high dose, seasonal 01/07/2016, 11/18/2017     Influenza, inj, historic,unspecified 01/06/2016     Influenza, seasonal,quad inj 36+ mos 01/13/2015     Influenza,seasonal quad, PF, 36+MOS 12/03/2018     Pneumo Conj 13-V (2010&after) 01/13/2015     Td,adult,historic,unspecified 07/17/2008     Tdap 07/17/2008     ZOSTER, LIVE 10/20/2010       Health Maintenance: Immunizations reviewed and up-to-date.    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  14 inches of colon remaining today had colonoscopy or flex sig and allCLEAR.  There was an area of inflammatory  reaction that was biopsied formal report is pending.    Hysterectomy in January of this year it was a complete hysterectomy because of James syndrome.    Osteoporosis demonstrated on DEXA bone density scan of 2017.    Mammogram done 2017-.    Past Surgical History: As above 2 colon surgeries for colon cancer.  James syndrome.    Hysterectomy complete with bilateral oophorectomy for James syndrome prophylactically done.  Prior history of endometriosis.    Family History: Her father  of colon cancer at age 73.  There are cousins with colon cancer 2 sisters with colon cancer one son with colon cancer one daughter well a registered nurse.   living and well supportive history of atrial fibrillation also a .  Mother  age 98.    Social History:  in [place], [place].    Exam Chest clear to auscultation and percussion.  Heart tones regular rhythm without murmur rub or gallop.  Abdomen soft nontender no organomegaly.  No peritoneal signs.  Extremities free of edema cyanosis or clubbing.  Neck veins nondistended no thyromegaly or scleral icterus noted, carotids full.  Skin warm and dry easily conversant good spirited.  Normal intelligence.  Neurologically intact no gross localizing findings.    Assessment and Plan  Medically acceptable risk for anticipated surgery electrocardiogram showed sinus mechanism rate 83 normal EKG.  Hemogram comprehensive metabolic profile are pending.  No prior problems with general anesthesia for this patient renal family history of malignant hyperthermia associated with general anesthesia.    Total time spent with the patient today was 40 minutes of which greater than 50% was spent in counseling and coordination of care.    Wes Campos MD    Patient Active Problem List   Diagnosis     Adenocarcinoid Tumor Of The Large Intestine     Community-acquired Pneumonia     Osteoporosis     Abnormal Blood Chemistry     Dysphagia      Headache     Palpitations     Osteopenia     Health maintenance examination     Otalgia of right ear     Ceruminosis

## 2021-06-22 NOTE — PATIENT INSTRUCTIONS - HE
Please call [facility] to schedule an appointment. If you received a prescription please bring it with you to your appointment. You may call one of the locations below, although some locations are limited to what they carry.    Office Locations    [place]  [address] [place] [address]   Phone: [phone]    [facility]  [address]  [place] [address]  Phone: [phone]  Fax: [phone]    [facility]   [address]  [place] [address]  Phone: [phone]  Fax: [phone]    [facility]  [address]  [place] [address]  Phone: [phone]  Fax: [phone]    [facility]  [address] [facility]  [address]  [place] [address]  Phone: [phone]  Fax: [phone]    [facility] [place]  [address]  [address]   [place] [address]   Phone: [phone]  Fax: [phone]    [place]   [address] [place] [address]   Phone: [phone]  Fax: [phone]

## 2021-06-22 NOTE — PROGRESS NOTES
Admission History & Physical  Vidya Brink, 1950, [id number]    [facility]  Wes Campos MD, [phone]    Extended Emergency Contact Information  Primary Emergency Contact: Juarez Brink  Address: [address] [place]  Home Phone: [phone]  Mobile Phone: [phone]  Relation: Spouse     Assessment and Plan:   Assessment: Pronation deformity bilaterally, tyloma  Plan: I have recommended a new pair of orthotics.  Debrided hyperkeratotic lesion third toe right foot  Active Problems:    * No active hospital problems. *      Chief Complaint:  Flatfeet     HPI:    Vidya Brink is a 68 y.o. old female who presented to the clinic today requesting a new pair of orthotics.  The patient has been worn orthotics for the past several years.  She was being treated for pronation deformity.  She has not had a new pair of orthotics for 15 years.  She stated that she has had significant benefit from wearing her orthotics.  The patient also complained of a painful corn on the top of the third toe right foot.  History is provided by patient    Medical History  Active Ambulatory (Non-Hospital) Problems    Diagnosis     Ceruminosis     Otalgia of right ear     Health maintenance examination     Osteopenia     Adenocarcinoid Tumor Of The Large Intestine     Community-acquired Pneumonia     Osteoporosis     Abnormal Blood Chemistry     Dysphagia     Headache     Palpitations     Past Medical History:   Diagnosis Date     Colon cancer (H) 1989, 1990     Fibrocystic breast      Patient Active Problem List    Diagnosis Date Noted     Ceruminosis 03/16/2015     Otalgia of right ear 03/03/2015     Health maintenance examination 01/13/2015     Osteopenia      Adenocarcinoid Tumor Of The Large Intestine      Community-acquired Pneumonia      Osteoporosis      Abnormal Blood Chemistry      Dysphagia      Headache      Palpitations      Surgical  "History  She  has no past surgical history on file.   History reviewed. No pertinent surgical history. Social History  Reviewed, and she  reports that  has never smoked. she has never used smokeless tobacco. She reports that she does not use drugs.  Social History     Tobacco Use     Smoking status: Never Smoker     Smokeless tobacco: Never Used   Substance Use Topics     Alcohol use: Not on file     Comment: seldom one glass wine per month      Allergies  Allergies   Allergen Reactions     Alendronate      Alendronic Acid Other (See Comments)     Other reaction(s): Swelling joints  Joint swelling      Family History  Reviewed, and family history includes Breast cancer (age of onset: 48) in her paternal grandmother.   Psychosocial Needs  Social History     Social History Narrative     Not on file     Additional psychosocial needs reviewed per nursing assessment.       Prior to Admission Medications     (Not in a hospital admission)        Review of Systems - Negative     /62 (Patient Site: Left Arm, Patient Position: Sitting, Cuff Size: Adult Regular)   Pulse 72   Temp 98  F (36.7  C) (Oral)   Ht 5' 7.5\" (1.715 m)   Wt 177 lb 1.6 oz (80.3 kg)   LMP 11/11/2001   BMI 27.33 kg/m      Objective findings: General: The patient is alert and in no acute distress     Integument: Nails bilateral feet are normal length and color.  Skin bilaterally warm supple and intact.  There is a thick hyperkeratotic lesion on the dorsal aspect of the distal interphalangeal joint third toe right foot.       Vascular: DP and PT pulses +2/4 bilateral feet.  Capillary refill less than 2 seconds bilaterally.     Neurologic: Negative clonus, negative Babinski bilateral feet.     Musculoskeletal: Range of motion within normal limits bilateral feet.  Muscle power +5/5 bilaterally within all compartments.  There is flattening of the medial longitudinal arch noted bilaterally.     Assessment: Pronation deformity bilateral feet, " tyloma     Plan: I debrided the hyperkeratotic lesion third toe right foot.  I have recommended a new pair of orthotics.

## 2021-06-23 NOTE — PROGRESS NOTES
"S: Patient is a 69 y/o female, 5'7\",  177 lbs, seen at our [place] office for the evaluation and casting for custom foot orthotics on orders from Dr. Josefa DPM for the treatment of Dx: pronation deformity of both feet.     O: Patient presents with bilateral pes planus, pronation deformity, and slight valgus deformity of the ankle bilaterally. Patient c/o pain in both her feet throughout the day that is exacerbates by weight-bearing and ambulation.     G: Patient's custom foot orthotic will help to support, stabilize and correct the angular deformity of the patient's foot and ankle complex. Patient requires a custom solution as no OTS option will provide the necessary angular correction.     A: I took bilateral biofoam impression of the patient's feet for the fabrication of custom FOs. Patient's FOs will be fabricated using a base of copoly, mid layer of matte poron, and top cover of JERROD. Patient's FOs will also include a P-wing addition in order to help correct the angular deformity of the patient's foot and ankle complex.     P: Patient was asked to schedule her appointment for two weeks time as she was leaving our office today.   "

## 2021-06-23 NOTE — TELEPHONE ENCOUNTER
Colonoscopy Order Update:    [facility] has left multiple messages for patient. A contact letter has also been sent to patient's home. No response from patient. We will defer this order at this time in case patient wants to schedule in the future.     No additional action is currently needed.    Thank you

## 2021-06-23 NOTE — PROGRESS NOTES
"S: Patient is a 67 y/o female, 5'7\", 177 lbs, seen at our [place] office for the fitting and delivery of her custom foot orthotics on orders from Dr. Josefa DPM for the treatment of Dx: pronation deformity of both feet.     O: Patient presents with bilateral pes planus, pronation deformity, and slight valgus deformity of the ankle bilaterally. Patient c/o pain in both her feet throughout the day that is exacerbates by weight-bearing and ambulation.     G: Patient's custom foot orthotic will help to support, stabilize and correct the angular deformity of the patient's foot and ankle complex. Patient requires a custom solution as no OTS option will provide the necessary angular correction.     A: I did not need to trim the patient's FOs as they were sulcus trimlines. Patient was immediately happy with the fit and function of her FOs. Patient and I discussed care, use, and break in period associated with her new FOs. Patient reported that she understood all the instructions she was given and had no further questions.      P: patient was asked to call our office if there are any issues with her FOs in the future.  "

## 2021-06-23 NOTE — PROGRESS NOTES
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    1. Routine general medical examination at a health care facility  Annual wellness visit  - HM2(CBC w/o Differential)  - Comprehensive Metabolic Panel  - Lipid Cascade  - Thyroid Stimulating Hormone (TSH)  - Urinalysis-UC if Indicated  - CEA (Carcinoembryonic Antigen)     The patient's current medical problems were reviewed.    I have had an Advance Directives discussion with the patient.  The following health maintenance schedule was reviewed with the patient and provided in printed form in the after visit summary:   Health Maintenance   Topic Date Due     COLONOSCOPY  04/22/1999     ZOSTER VACCINES (2 of 3) 12/15/2010     PNEUMOCOCCAL POLYSACCHARIDE VACCINE AGE 65 AND OVER  04/22/2014     TD 18+ HE  07/17/2018     DXA SCAN  12/04/2019     FALL RISK ASSESSMENT  01/03/2020     MAMMOGRAM  01/02/2021     ADVANCE DIRECTIVES DISCUSSED WITH PATIENT  01/29/2024     INFLUENZA VACCINE RULE BASED  Completed     PNEUMOCOCCAL CONJUGATE VACCINE FOR ADULTS (PCV13 OR PREVNAR)  Completed        Subjective:   Chief Complaint: Vidya Brink is an 68 y.o. female here for an Annual Wellness visit.   HPI: Annual wellness visit and physical examination for this 68-year-old female  from [place].    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  Only 14 inches of her colon remaining and she was found on her last flex sig her colon exam with Dr. Vega of [facility] dated January 3, 2019 to have some inflammatory reaction in her bowel plus internal hemorrhoids.  She asked what she should do about it and I asked her to recall and talk to Dr. Vega from [facility].    Mammogram allCLEAR January 2, 2019.    She does not smoke she uses alcohol but on a light social basis she is allergic to  Fosamax she is had a history of osteoporosis previously seen by Dr. Garvin from osteoporosis services.    Immunizations are reviewed and up-to-date.    James syndrome hysterectomy was complete with bilateral oophorectomy done prophylactically prior history of endometriosis.    The patient has had 2 colon cancer surgeries and only 14 inches of her colon remaining.    Father  of colon cancer age 73 there are cousins with colon cancer 2 sisters with colon cancer and one son who developed colon cancer at age 28.  One daughter is well registered nurse  living and well he has had a history of atrial fibrillation also a  and also patient of this examiner.  Patient's mother  age 98.    She herself the patient is a  works in [employer].  She had recent right shoulder rotator cuff surgery from which she is recovering this occurred on 2019    Review of Systems:    Please see above.  The rest of the review of systems are negative for all systems.    Patient Care Team:  Wes Campos MD as PCP - General     Patient Active Problem List   Diagnosis     Adenocarcinoid Tumor Of The Large Intestine     Community-acquired Pneumonia     Osteoporosis     Abnormal Blood Chemistry     Dysphagia     Headache     Palpitations     Osteopenia     Health maintenance examination     Otalgia of right ear     Ceruminosis     Past Medical History:   Diagnosis Date     Colon cancer (H) ,      Fibrocystic breast       History reviewed. No pertinent surgical history.   Family History   Problem Relation Age of Onset     Breast cancer Paternal Grandmother 48      Social History     Socioeconomic History     Marital status:      Spouse name: Not on file     Number of children: Not on file     Years of education: Not on file     Highest education level: Not on file   Social Needs     Financial resource strain: Not on file     Food insecurity - worry: Not on file     Food  "insecurity - inability: Not on file     Transportation needs - medical: Not on file     Transportation needs - non-medical: Not on file   Occupational History     Not on file   Tobacco Use     Smoking status: Never Smoker     Smokeless tobacco: Never Used   Substance and Sexual Activity     Alcohol use: No     Frequency: Never     Comment: seldom one glass wine per month     Drug use: No     Sexual activity: Not on file   Other Topics Concern     Not on file   Social History Narrative     Not on file      Current Outpatient Medications   Medication Sig Dispense Refill     CALCIUM CARBONATE/VITAMIN D3 (CALCIUM 600 + D,3, ORAL) Take 1 tablet by mouth daily.       No current facility-administered medications for this visit.       Objective:   Vital Signs:   Visit Vitals  /70 (Patient Site: Left Arm, Patient Position: Sitting)   Pulse 74   Ht 5' 7.5\" (1.715 m)   Wt 165 lb (74.8 kg)   LMP 11/11/2001   SpO2 99%   BMI 25.46 kg/m         VisionScreening:  No exam data present     PHYSICAL EXAM  Chest clear to auscultation and percussion.  Heart tones regular rhythm without murmur rub or gallop.  Abdomen soft nontender no organomegaly.  No peritoneal signs.  Extremities free of edema cyanosis or clubbing.  Neck veins nondistended no thyromegaly or scleral icterus noted, carotids full.  Skin warm and dry easily conversant good spirited.  Normal intelligence.  Neurologically intact no gross localizing findings.  Rest of exam negative in its entirety the patient did not have a breast or pelvic examination she is status post total abdominal hysterectomy and rectal exams been done through [facility] and recent mammogram dated 2019 allCLEAR.  She is fair complected she wears glasses she is neatly attired her right arm was then the sling from the recent right shoulder rotator cuff surgery dated January 7, 2019 lymph negative neuro negative psych normal she is fit she is not overweight she appears well color is good blood " pressure is 116/70 pulse 74 respirations 18 O2 sats 99%.  Weight is up 1 pound BMI is near ideal at 25.46.  Return to clinic annually for physical examination.  CEA level will be also checked because of her history of James syndrome and to colon cancer surgeries with only 14 inches of her colon removed last colonoscopy allCLEAR or flex sig examination more appropriately on January 3, 2019 with Dr. Vega.  Ileitis was seen in his name was given to the patient to check back as to what she should do about that specifically in terms of diet or meds.    Assessment Results 1/29/2019   Activities of Daily Living No help needed   Instrumental Activities of Daily Living No help needed   Get Up and Go Score Less than 12 seconds   Mini Cog Total Score 5   Some recent data might be hidden     A Mini-Cog score of 0-2 suggests the possibility of dementia, score of 3-5 suggests no dementia    Identified Health Risks:     She is at risk for falling and has been provided with information to reduce the risk of falling at home.  Patient's advanced directive was discussed and I am comfortable with the patient's wishes.

## 2021-06-29 NOTE — PROGRESS NOTES
"Progress Notes by Donny Carrillo MD at 8/28/2020  1:00 PM     Author: Donny Carrillo MD Service: -- Author Type: Physician    Filed: 9/17/2020  2:34 PM Encounter Date: 8/28/2020 Status: Signed    : Donny Carrillo MD (Physician)             HPI:  This is a 70 y.o. female here today with concerns of pain and bulging in her right groin,  bilateral groins, umbilicus, ventral and incisional area. She has noted this for the past few month(s). The symptoms have progressed and increased over this time. She comes in for evaluation secondary to the hernia causing enough issues to bother them with daily activities or chores.    Allergies:Fosamax [alendronate]    Past Medical History:   Diagnosis Date   ? Arthritis    ? Colon cancer (H) 1989, 1990   ? Fibrocystic breast    ? Pneumonia        Past Surgical History:   Procedure Laterality Date   ? COLON SURGERY     ? HYSTERECTOMY     ? PELVIC LAPAROSCOPY      endometriosis   ? ROTATOR CUFF REPAIR Right    ? SALPINGOOPHORECTOMY         CURRENT MEDS:      Family History   Problem Relation Age of Onset   ? Breast cancer Paternal Grandmother 48        reports that she has never smoked. She has never used smokeless tobacco. She reports that she does not drink alcohol or use drugs.    Review of Systems - Negative except left groin bulge and pain. Increasing over time. Here for evaluation. Otherwise twelve system of review is negative.      Vitals:    08/28/20 1313   BP: 120/70   Patient Site: Right Arm   Patient Position: Sitting   Cuff Size: Adult Regular   Weight: 176 lb (79.8 kg)   Height: 5' 7.5\" (1.715 m)       Body mass index is 27.16 kg/m .    EXAM:  General: NAD   HEENT: normocephalic, PERRLA and EOMS intact  Mounth: Mucus membranes moist  Neck: Supple  Chest: Clear to auscultation bilaterally  CV: RRR  ABD: Soft nontender and nondistended, left inguinal hernia, reducible  EXT: Warm, pulses intact,   Neuro: Alert and oriented x3  Back: no CVA " tenderness    Assessment/Plan: Pt with a left inguinal hernia. I discussed this at length with She.  I went over conservative management as well as surgical treatment of this.. I would plan on doing this via anopen  approach with possible use of mesh. I went over the small risks of surgery including but not limited to bleeding and infection, anesthesia, recurrence rates and nerve injury. I discussed the expected recovery time as well. Will get this scheduled. She will contact us to have this scheduled.      MD Donny Sarabia MD, MD  General Surgery [phone]  Vascular Surgery [phone]

## 2021-06-29 NOTE — PROGRESS NOTES
Progress Notes by Shea Ricks CNP at 7/11/2020 12:00 PM     Author: Shea Ricks CNP Service: -- Author Type: Nurse Practitioner    Filed: 7/11/2020  3:08 PM Encounter Date: 7/11/2020 Status: Signed    : Shea Ricks CNP (Nurse Practitioner)       Chief Complaint   Patient presents with   ? Follow-up     big water bump inside marked area, leaking fluid from the water bump, pt states getting much better       HPI:  Vidya Brink is a 70 y.o. female who presents today for a wound re-check following a recent RIGHT lower leg cellulitis due to a tick bite that occurred 7/7/2020. Patient was seen recently seen on 7/9/2020 and began a treatment course with Ceftin for a 3 week course. Patient notes overall improvement of pain and redness, but increased in size of fluid filled vesicles. Denies any new onset fevers or chills. Of note, patient reports softer stools and asking if antibiotic can be stopped if diarrhea occurs.     History obtained from the patient.    Problem List:  2015-03: Ceruminosis  2015-03: Otalgia of right ear  2015-01: Health maintenance examination  Adenocarcinoid Tumor Of The Large Intestine  Community-acquired Pneumonia  Osteoporosis  Abnormal Blood Chemistry  Dysphagia  Headache  Palpitations  Osteopenia      Past Medical History:   Diagnosis Date   ? Colon cancer (H) 1989, 1990   ? Fibrocystic breast        Social History     Tobacco Use   ? Smoking status: Never Smoker   ? Smokeless tobacco: Never Used   Substance Use Topics   ? Alcohol use: No     Frequency: Never     Comment: seldom one glass wine per month       Review of Systems   Constitutional: Negative for chills, fatigue and fever.   Musculoskeletal: Negative for myalgias.   Skin: Positive for wound.   Neurological: Negative for headaches.   All other systems reviewed and are negative.      Vitals:    07/11/20 1202   BP: 97/61   Pulse: 83   Resp: 12   Temp: 98.5  F (36.9  C)   TempSrc: Oral   SpO2: 96%        Physical Exam  Constitutional:       Appearance: Normal appearance. She is not ill-appearing or diaphoretic.   Cardiovascular:      Rate and Rhythm: Normal rate and regular rhythm.      Pulses: Normal pulses.      Heart sounds: Normal heart sounds.   Pulmonary:      Effort: Pulmonary effort is normal.      Breath sounds: Normal breath sounds.   Skin:     General: Skin is warm.      Capillary Refill: Capillary refill takes less than 2 seconds.      Findings: Erythema present.      Comments: RIGHT lateral thigh with a erythemic circular area of about 4x3cm area, still less than the previously marked area. Note 4 fluid filled vesicles, one with increased fluid, mild weeping of serous fluid. Surrounding circular area with mild warmth improving erythema, non-tender to touch.    Neurological:      General: No focal deficit present.      Mental Status: She is alert and oriented to person, place, and time. Mental status is at baseline.         No notes on file    Labs:  Recent Results (from the past 72 hour(s))   Lyme Antibody Cascade   Result Value Ref Range    Lyme Antibody Cascade 0.05 <0.90 Index Value   HM1 (CBC with Diff)   Result Value Ref Range    WBC 6.9 4.0 - 11.0 thou/uL    RBC 4.24 3.80 - 5.40 mill/uL    Hemoglobin 12.5 12.0 - 16.0 g/dL    Hematocrit 36.7 35.0 - 47.0 %    MCV 87 80 - 100 fL    MCH 29.5 27.0 - 34.0 pg    MCHC 34.1 32.0 - 36.0 g/dL    RDW 13.0 11.0 - 14.5 %    Platelets 153 140 - 440 thou/uL    MPV 10.0 7.0 - 10.0 fL    Neutrophils % 61 50 - 70 %    Lymphocytes % 26 20 - 40 %    Monocytes % 7 2 - 10 %    Eosinophils % 5 0 - 6 %    Basophils % 1 0 - 2 %    Neutrophils Absolute 4.2 2.0 - 7.7 thou/uL    Lymphocytes Absolute 1.8 0.8 - 4.4 thou/uL    Monocytes Absolute 0.5 0.0 - 0.9 thou/uL    Eosinophils Absolute 0.3 0.0 - 0.4 thou/uL    Basophils Absolute 0.0 0.0 - 0.2 thou/uL       Radiology: None obtained    Clinical Decision Making: At the end of the encounter, I discussed results,  diagnosis, medications. Discussed red flags of worsening wound infection, need for close monitoring especially only being on Ceftin. Reviewed concern for MRSA and need to complete course, consider yogurt and/or probiotics along with antibiotics. Unable to obtain wound culture today, strongly encouraged patient to follow up in 3 days with PCP for reevaluation of wound and possible wound culture. Patient understood and agreed to plan.     Rosy Ricks APRN, CNP       1. Encounter for wound re-check     2. Cellulitis of right lower extremity     3. Tick bite of right lower leg, sequela           Patient Instructions     Patient Education     Wound Check, Infection  You have a wound that has become infected. The wound will not heal properly unless the infection is cleared. Infection in a wound may also spread if it is not treated. In most cases, antibiotic medicines are prescribed to treat a wound infection.   Symptoms of a wound infection include:    Redness or swelling around the wound    Warmth coming from the wound    New or worsening pain    Red streaks around the wound    Draining pus    Fever  Home care  Follow all directions you are given to treat the infection.  Medicines  Take all medicines as prescribed.     If you were given antibiotics, take them until they are gone or your healthcare provider tells you to stop. It is vital to finish the antibiotics even if you feel better. If you do not finish them, the infection may come back and be harder to treat.    If your infection is not responding to the medicines you are taking, you may be prescribed new medicines.    Take medicine for pain as directed by your healthcare provider.  Wound care  Care for your wound as directed by your healthcare provider.    Apply a warm compress (clean cloth soaked in hot water) to the infected area for about 5 to 10 minutes at a time. Be very careful not to burn yourself. Test the cloth on a non-infected area to make sure it is  not too hot.    Continue to change the dressing daily. If it becomes wet, stained with wound fluid, or dirty, change it sooner. To change it:  ? Wash your hands with soap and water before changing the dressing.  ? Carefully remove the dressing and tape. If it sticks to the wound, you may need to wet it a little to remove it. (Do not do this if your healthcare provider has told you not to.)  ? Gently clean the wound with clean water (or saline) using gauze, a clean washcloth, or cotton swab.  ? Do not use soap, alcohol, peroxide or other cleansers.  ? If you were told to dry the wound before putting on a new dressing, gently pat. Do not rub.  ? Throw out the old dressing.  ? Wash your hands again before opening the new, clean dressing.  ? Wash your hands again when you are done.  Follow-up care  Follow up with your healthcare provider as advised. If a culture was done, you will be notified if your treatment needs to change. Call as directed for the results.  When to seek medical advice  Call your health care provider right away if any of these occur:    Symptoms of infection don't start to improve within 2 days of starting antibiotics    Symptoms of infection get worse    New symptoms, such as red streaks around the wound    Fever of 100.4 F (38.0 C) or higher for more than 2 days after starting the antibiotics  Date Last Reviewed: 8/10/2015    9022-0719 The [employer]. [address] [place] [address], [place] [address]. All rights reserved. This information is not intended as a substitute for professional medical care. Always follow your healthcare professional's instructions.

## 2021-06-29 NOTE — PROGRESS NOTES
Progress Notes by Travon Restrepo PA-C at 7/9/2020  4:50 PM     Author: Travon Restrepo PA-C Service: -- Author Type: Physician Assistant    Filed: 7/9/2020  6:55 PM Encounter Date: 7/9/2020 Status: Signed    : Travon Restrepo PA-C (Physician Assistant)       Subjective:      Patient ID: Vidya Brink is a 70 y.o. female.    Chief Complaint:    HPI  Vidya Brink is a 70 y.o. female who presents today complaining of concern for a rash and infection at the tick bite site of her right thigh.  Patient recounts a past medical history for having a tick bite on her right lateral thigh on July 7.  Patient was seen at the [facility] in [facility] and had been placed on prophylactic doxycycline after tick removal.  She then developed a red painful bull's-eye type rash with a fluid-filled vesicle on the lateral thigh.  It is become bigger and painful so the patient came into clinic for evaluation and treatment.  The rash has developed over the last 2 days but has not had drainage from the wound.  He has no other stage I Lyme disease symptoms to include no headache fever chills night sweats myalgias arthralgias or rash anywhere else on the body.  No large arthropathies.      Past Medical History:   Diagnosis Date   ? Colon cancer (H) 1989, 1990   ? Fibrocystic breast        No past surgical history on file.    Family History   Problem Relation Age of Onset   ? Breast cancer Paternal Grandmother 48       Social History     Tobacco Use   ? Smoking status: Never Smoker   ? Smokeless tobacco: Never Used   Substance Use Topics   ? Alcohol use: No     Frequency: Never     Comment: seldom one glass wine per month   ? Drug use: No       Review of Systems  As above in HPI, otherwise balance of Review of Systems are negative.    Objective:     /68 (Patient Site: Right Arm, Patient Position: Sitting, Cuff Size: Adult Regular)   Pulse 68   Temp 98.4  F (36.9  C) (Oral)   Resp 16   Wt 178 lb 6 oz (80.9 kg)   LMP  11/11/2001   SpO2 97%   BMI 27.53 kg/m      Physical Exam  General: Patient is resting comfortably no acute distress is afebrile  HEENT: Head is normocephalic atraumatic   eyes are PERRL EOMI sclera anicteric   TMs are clear bilaterally  Throat is clear  No cervical lymphadenopathy present  LUNGS: Clear to auscultation bilaterally  HEART: Regular rate and rhythm  Skin: With a large 9 cm rash that is outlined in the photo below.  There is induration but no fluctuance and there was no discharge with palpation.  No culture was obtained since there was no drainage from the central site.  No noted central necrosis.          Assessment:     Procedures    The primary encounter diagnosis was Cellulitis of right lower extremity. Diagnoses of Lyme disease, Tick bite of right lower leg, sequela, and Screening for condition were also pertinent to this visit.    Plan:     1. Cellulitis of right lower extremity  cefuroxime (CEFTIN) 500 MG tablet   2. Lyme disease  cefuroxime (CEFTIN) 500 MG tablet   3. Tick bite of right lower leg, sequela  cefuroxime (CEFTIN) 500 MG tablet    HM1(CBC and Differential)    Lyme Antibody Cascade    HM1 (CBC with Diff)   4. Screening for condition  HM1(CBC and Differential)    Lyme Antibody Cascade    HM1 (CBC with Diff)       Had a conversation with the patient stating that sometimes insect bite such as tick bites can transmit MRSA.  Other times they can produce a similar lesion that would be confused with Lyme disease.  In this case she has had a tick bite at the tick bite site she developed an infection.  Therefore we will draw a CBC, a Lyme disease test and patient be placed on a antibiotic that will cover for both skin and soft tissue infection and Lyme disease.  However, this will not cover for MRSA.  Was explained to the patient.  I do want her to follow-up in 48 hours to recheck the process improvement with treatment on the antibiotic.  If at that time and is amenable to an I&D or there is  adequate central lesion fluid to obtain a culture that would be preferable.  Patient will follow-up in 48 hours for wound check and reevaluation and also next week with her primary care provider for reevaluation of the Lyme disease treatment and lab results.  Russians were answered to patient's satisfaction before discharge.    Patient Instructions   You have an infection at the tick bite site that may actually be infection from bacterial source but may also be consistent with Lyme disease.  The antibiotic that is given will cover for both skin infection and Lyme disease.  You are given a 3-week course of treatment.  Return to the walk-in care in 2 days to ensure that you are improving with your symptoms and not getting any worse over the next 48 hours.  If you should start developing fever chills night sweats fatigue or worsening symptoms return to the emergency room.  Follow-up with your primary care provider for reevaluation next week to see how you are improving with your symptoms.      Patient Education     Lyme Disease  Lyme disease is caused by bacteria. The infection is most often passed during the bite of a deer tick. The tick is very small, so many people with Lyme disease do not know they have been bitten. Tests for Lyme disease are not always accurate early in the disease. If the disease is suspected, treatment may begin before testing confirms the infection. A long course of antibiotics is the standard treatment.  If untreated, Lyme disease can worsen and full-body symptoms can develop          Early local symptoms may appear within a few days to a month after the tick bite. These symptoms may include a round, red rash that looks like a bull's-eye target with darker outer ring and a darker center. There may fever, chills, fatigue, body aches, and headache. In time, the rash goes away, even without treatment. That doesn't mean the infection has gone away, however. In some cases, early local symptoms  never develop.    Early disseminated symptoms may appear weeks to months after the bite. These can include muscle aches, fatigue, fever, headache, stiff neck, and joint pain and swelling.    Late-stage symptoms include weakness in an arm, leg or one side of the face, headache, fever, and numbness and tingling in the arms or legs, confusion, and memory loss.  Testing is done for the presence of the bacteria. When the infection is treated early, it can be cured. In some cases, a second or third course of antibiotics may be needed. Be sure to follow your healthcare providers directions about treatment.  Home care  If oral antibiotics have been prescribed, take them exactly as directed until they are completely gone. Do not stop taking them until you have taken the full course or your healthcare provider has told you to stop.  Ask your healthcare provider about taking over-the-counter medicines to control symptoms such as aches and fever.  Follow-up care  Follow up with your healthcare provider as advised. Be sure to return for follow-up testing as directed to be sure the infection has been treated.  When to seek medical advice  Call your healthcare provider right away if any of the following occur:    Current symptoms get worse    Unexplained fever, neck pain or stiffness, or headache    Arm, leg or facial weakness    Joint pain or swelling    Numbness and tingling in the arms or legs    Confusion or memory loss    Irregular or rapid heartbeat  Date Last Reviewed: 9/25/2015 2000-2017 [employer]. [address] [place] [address]. All rights reserved. This information is not intended as a substitute for professional medical care. Always follow your healthcare professional's instructions.           Patient Education     Discharge Instructions for Cellulitis  You have been diagnosed with cellulitis. This is an infection in the deepest layer of the skin and tissue beneath the skin. In some cases,  the infection also affects the muscle. Cellulitis is caused by bacteria. The bacteria can enter the body through broken skin. This can happen with a cut, scratch, animal bite, or an insect bite that has been scratched. You may have been treated in the hospital with antibiotics and fluids. You will likely be given a prescription for antibiotics to take at home. This sheet will help you take care of yourself at home.  Home care  When you are home:    Take the prescribed antibiotic medicine you are given as directed until it is gone. Take it even if you feel better. It treats the infection and stops it from returning. Not taking all the medicine can make future infections hard to treat.    Keep the infected area clean.    When possible, raise the infected area above the level of your heart. This helps keep swelling down.    Talk with your healthcare provider if you are in pain. Ask what kind of over-the-counter medicine you can take for pain.    Apply clean bandages as advised.    Take your temperature once a day for a week.    Wash your hands often to prevent spreading the infection.  In the future, wash your hands before and after you touch cuts, scratches, or bandages. This will help prevent infection.   When to call your healthcare provider  Call your healthcare provider right away if you have any of the following:    Trouble or pain when moving the joints above or below the infected area    Discharge or pus draining from the area    Fever of 100.4 F (38 C) or higher, or as directed by your healthcare provider    Pain that gets worse in or around the infected     Redness that gets worse in or around the infected area, particularly if the area of redness expands to a wider area    Shaking chills    Swelling of the infected area    Vomiting  Date Last Reviewed: 8/1/2016 2000-2019 [employer]. [address] [place] [address], [place] [address]. All rights reserved. This information is not intended as a  substitute for professional medical care. Always follow your healthcare professional's instructions.

## 2021-07-03 NOTE — ADDENDUM NOTE
Addendum Note by Doreen Cesar MD at 1/29/2019  9:00 AM     Author: Doreen Cesar MD Service: -- Author Type: Physician    Filed: 1/29/2019  9:34 AM Encounter Date: 1/29/2019 Status: Signed    : Doreen Cesar MD (Physician)    Addended by: DOREEN CESAR on: 1/29/2019 09:34 AM        Modules accepted: Level of Service

## 2021-07-13 ENCOUNTER — RECORDS - HEALTHEAST (OUTPATIENT)
Dept: ADMINISTRATIVE | Facility: CLINIC | Age: 71
End: 2021-07-13

## 2021-07-20 ENCOUNTER — OFFICE VISIT (OUTPATIENT)
Dept: PODIATRY | Facility: CLINIC | Age: 71
End: 2021-07-20
Payer: COMMERCIAL

## 2021-07-20 VITALS — HEART RATE: 81 BPM | OXYGEN SATURATION: 97 % | WEIGHT: 176 LBS | HEIGHT: 68 IN | BODY MASS INDEX: 26.67 KG/M2

## 2021-07-20 DIAGNOSIS — M21.6X1 PRONATION DEFORMITY OF BOTH FEET: Primary | ICD-10-CM

## 2021-07-20 DIAGNOSIS — M21.6X2 PRONATION DEFORMITY OF BOTH FEET: Primary | ICD-10-CM

## 2021-07-20 PROCEDURE — 99213 OFFICE O/P EST LOW 20 MIN: CPT | Performed by: PODIATRIST

## 2021-07-20 RX ORDER — RISEDRONATE SODIUM 35 MG/1
35 TABLET, FILM COATED ORAL
COMMUNITY
Start: 2021-07-19 | End: 2024-06-20

## 2021-07-20 ASSESSMENT — PAIN SCALES - GENERAL: PAINLEVEL: MILD PAIN (3)

## 2021-07-20 ASSESSMENT — MIFFLIN-ST. JEOR: SCORE: 1356.83

## 2021-07-20 NOTE — LETTER
7/20/2021         RE: Vidya Brink  [address]  [facility] [address]        Dear Colleague,    Thank you for referring your patient, Vidya Brink, to the [facility]. Please see a copy of my visit note below.    FOOT AND ANKLE SURGERY/PODIATRY Progress Note        ASSESSMENT:   Pronation deformity    HPI: Vidya Brink was seen again today for follow-up evaluation of bilateral foot pain.  The patient stated that she does have some moderate foot pain which is aggravated with prolonged weightbearing and ambulation.  When she first gets out of bed in the mornings her feet are quite stiff.  This does improve throughout the day with activity.  She has a history of wearing orthotics.  She has not had a new pair of orthotics for the past year or 2.  Is not had any redness or swelling involving her feet.  She denies any recent trauma to her feet or ankles.      Past Medical History:   Diagnosis Date     Arthritis      Colon cancer (H) 1989, 1990     Fibrocystic breast      Pneumonia         Past Surgical History:   Procedure Laterality Date     ARTHROSCOPY SHOULDER ROTATOR CUFF REPAIR Right      COLON SURGERY       HYSTERECTOMY       LAPAROSCOPIC HERNIORRHAPHY INGUINAL Left 9/17/2020    Procedure: LAPAROSCOPIC LEFT INGUINAL HERNIA REPAIR, POSSIBLE OPEN;  Surgeon: Donny Carrillo MD;  Location: [facility];  Service: General     PELVIC LAPAROSCOPY      endometriosis     SALPINGOOPHORECTOMY         Allergies   Allergen Reactions     Fosamax [Alendronic Acid]          Current Outpatient Medications:      CALCIUM-VITAMIN D PO, Take by mouth daily, Disp: , Rfl:      ketoconazole (NIZORAL) 2 % external cream, APPLY CREAM TOPICALLY TWICE DAILY TO FEET, Disp: , Rfl:     Family History   Problem Relation Age of Onset     Breast Cancer Paternal Grandmother 48.00       Social History     Socioeconomic History     Marital status:      Spouse name: Not on file      Number of children: Not on file     Years of education: Not on file     Highest education level: Not on file   Occupational History     Not on file   Tobacco Use     Smoking status: Never Smoker   Substance and Sexual Activity     Alcohol use: Not on file     Drug use: Not on file     Sexual activity: Not on file   Other Topics Concern     Not on file   Social History Narrative     Not on file     Social Determinants of Health     Financial Resource Strain:      Difficulty of Paying Living Expenses:    Food Insecurity:      Worried About Running Out of Food in the Last Year:      Ran Out of Food in the Last Year:    Transportation Needs:      Lack of Transportation (Medical):      Lack of Transportation (Non-Medical):    Physical Activity:      Days of Exercise per Week:      Minutes of Exercise per Session:    Stress:      Feeling of Stress :    Social Connections:      Frequency of Communication with Friends and Family:      Frequency of Social Gatherings with Friends and Family:      Attends [employer] Services:      Active Member of Clubs or Organizations:      Attends Club or Organization Meetings:      Marital Status:    Intimate Partner Violence:      Fear of Current or Ex-Partner:      Emotionally Abused:      Physically Abused:      Sexually Abused:        10 point Review of Systems is negative      There were no vitals taken for this visit.    BMI= There is no height or weight on file to calculate BMI.    OBJECTIVE:  General appearance: Patient is alert and fully cooperative with history & exam.  No sign of distress is noted during the visit.  Vascular: Dorsalis pedis and posterior tibial pulses are palpable. There is no pedal hair growth bilaterally.  CFT < 3 sec from anterior tibial surface to distal digits bilaterally. There is no appreciable edema noted.  Dermatologic: Turgor and texture are within normal limits. No coloration or temperature changes. No primary or secondary lesions noted.  Neurologic:  All epicritic and proprioceptive sensations are grossly intact bilaterally.  Musculoskeletal: All active and passive ankle, subtalar, midtarsal, and 1st MPJ range of motion are grossly intact without pain or crepitus, with the exception of none. Manual muscle strength is within normal limits bilaterally. All dorsiflexors, plantarflexors, invertors, evertors are intact bilaterally. Tenderness present to the medial longitudinal arch bilaterally on palpation.  No tenderness to bilateral feet or ankles with range of motion.  There is severe flattening of the medial longitudinal arch noted bilaterally.  Calf is soft/non-tender without warmth/induration    Imaging:         No results found.         TREATMENT:  I have recommended a new pair of orthotics.  The patient is to return to the clinic as needed.        Austin Gallegos DPM  [facility]         Again, thank you for allowing me to participate in the care of your patient.        Sincerely,        Austin Rivero DPM

## 2021-07-20 NOTE — PATIENT INSTRUCTIONS
What are Prescription Custom Orthotics?  Custom orthotics are specially-made devices designed to support and comfort your feet. Prescription orthotics are crafted for you and no one else. They match the contours of your feet precisely and are designed for the way you move. Orthotics are only manufactured after a podiatrist has conducted a complete evaluation of your feet, ankles, and legs, so the orthotic can accommodate your unique foot structure and pathology.  Prescription orthotics are divided into two categories:    Functional orthotics are designed to control abnormal motion. They may be used to treat foot pain caused by abnormal motion; they can also be used to treat injuries such as shin splints or tendinitis. Functional orthotics are usually crafted of a semi-rigid material such as plastic or graphite.    Accommodative orthotics are softer and meant to provide additional cushioning and support. They can be used to treat diabetic foot ulcers, painful calluses on the bottom of the foot, and other uncomfortable conditions.  Podiatrists use orthotics to treat foot problems such as plantar fasciitis, bursitis, tendinitis, diabetic foot ulcers, and foot, ankle, and heel pain. Clinical research studies have shown that podiatrist-prescribed foot orthotics decrease foot pain and improve function.  Orthotics typically cost more than shoe inserts purchased in a retail store, but the additional cost is usually well worth it. Unlike shoe inserts, orthotics are molded to fit each individual foot, so you can be sure that your orthotics fit and do what they're supposed to do. Prescription orthotics are also made of top-notch materials and last many years when cared for properly. Insurance often helps pay for prescription orthotics.  What are Shoe Inserts?   You've seen them at the grocery store and at the mall. You've probably even seen them on TV and online. Shoe inserts are any kind of non-prescription foot support  designed to be worn inside a shoe. Pre-packaged, mass produced, arch supports are shoe inserts. So are the  custom-made  insoles and foot supports that you can order online or at retail stores. Unless the device has been prescribed by a doctor and crafted for your specific foot, it's a shoe insert, not a custom orthotic device--despite what the ads might say.  Shoe inserts can be very helpful for a variety of foot ailments, including flat arches and foot and leg pain. They can cushion your feet, provide comfort, and support your arches, but they can't correct biomechanical foot problems or cure long-standing foot issues.  The most common types of shoe inserts are:    Arch supports: Some people have high arches. Others have low arches or flat feet. Arch supports generally have a  bumped-up  appearance and are designed to support the foot's natural arch.     Insoles: Insoles slip into your shoe to provide extra cushioning and support. Insoles are often made of gel, foam, or plastic.     Heel liners: Heel liners, sometimes called heel pads or heel cups, provide extra cushioning in the heel region. They may be especially useful for patients who have foot pain caused by age-related thinning of the heels' natural fat pads.     Foot cushions: Do your shoes rub against your heel or your toes? Foot cushions come in many different shapes and sizes and can be used as a barrier between you and your shoe.  Choosing an Over-the-Counter Shoe Insert  Selecting a shoe insert from the wide variety of devices on the market can be overwhelming. Here are some podiatrist-tested tips to help you find the insert that best meets your needs:    Consider your health. Do you have diabetes? Problems with circulation? An over-the-counter insert may not be your best bet. Diabetes and poor circulation increase your risk of foot ulcers and infections, so schedule an appointment with a podiatrist. He or she can help you select a solution that won't  cause additional health problems.     Think about the purpose. Are you planning to run a marathon, or do you just need a little arch support in your work shoes? Look for a product that fits your planned level of activity.     Bring your shoes. For the insert to be effective, it has to fit into your shoes. So bring your sneakers, dress shoes, or work boots--whatever you plan to wear with your insert. Look for an insert that will fit the contours of your shoe.     Try them on. If all possible, slip the insert into your shoe and try it out. Walk around a little. How does it feel? Don't assume that feelings of pressure will go away with continued wear. (If you can't try the inserts at the store, ask about the store's return policy and hold on to your receipt.)    Please call one of the [facility] locations below to schedule an appointment. If you received a prescription please bring it with you to your appointment. Some locations are limited to what they carry.    Office Locations    [facility]  [address] [place] [address]  Home Medical Equipment, [address]   Phone: [phone]   Orthotics and Prosthetics, [address]   Phone: [phone]    [facility]  Home Medical Equipment  [address], [place] [address]   Phone: [phone]    Orthotics and Prosthetics ([facility])    [address], [address], [place] [address]  Phone: [phone]    [facility]  [address]   [place] [address]   Phone: [phone]    [facility]  [address]  [place] [address]  Phone: [phone]    [facility]   [address]  [place] [address]  Phone: [phone]    [facility]  [address]  [place] [address]  Phone: [phone]    [facility]  [address]  [place] [address]  Phone: [phone]    [address] [place] [address]   Phone: [phone]

## 2021-07-20 NOTE — PROGRESS NOTES
FOOT AND ANKLE SURGERY/PODIATRY Progress Note        ASSESSMENT:   Pronation deformity    HPI: Vidya Brink was seen again today for follow-up evaluation of bilateral foot pain.  The patient stated that she does have some moderate foot pain which is aggravated with prolonged weightbearing and ambulation.  When she first gets out of bed in the mornings her feet are quite stiff.  This does improve throughout the day with activity.  She has a history of wearing orthotics.  She has not had a new pair of orthotics for the past year or 2.  Is not had any redness or swelling involving her feet.  She denies any recent trauma to her feet or ankles.      Past Medical History:   Diagnosis Date     Arthritis      Colon cancer (H) 1989, 1990     Fibrocystic breast      Pneumonia         Past Surgical History:   Procedure Laterality Date     ARTHROSCOPY SHOULDER ROTATOR CUFF REPAIR Right      COLON SURGERY       HYSTERECTOMY       LAPAROSCOPIC HERNIORRHAPHY INGUINAL Left 9/17/2020    Procedure: LAPAROSCOPIC LEFT INGUINAL HERNIA REPAIR, POSSIBLE OPEN;  Surgeon: Donny Carrillo MD;  Location: [facility];  Service: General     PELVIC LAPAROSCOPY      endometriosis     SALPINGOOPHORECTOMY         Allergies   Allergen Reactions     Fosamax [Alendronic Acid]          Current Outpatient Medications:      CALCIUM-VITAMIN D PO, Take by mouth daily, Disp: , Rfl:      ketoconazole (NIZORAL) 2 % external cream, APPLY CREAM TOPICALLY TWICE DAILY TO FEET, Disp: , Rfl:     Family History   Problem Relation Age of Onset     Breast Cancer Paternal Grandmother 48.00       Social History     Socioeconomic History     Marital status:      Spouse name: Not on file     Number of children: Not on file     Years of education: Not on file     Highest education level: Not on file   Occupational History     Not on file   Tobacco Use     Smoking status: Never Smoker   Substance and Sexual Activity     Alcohol use: Not on file     Drug  use: Not on file     Sexual activity: Not on file   Other Topics Concern     Not on file   Social History Narrative     Not on file     Social Determinants of Health     Financial Resource Strain:      Difficulty of Paying Living Expenses:    Food Insecurity:      Worried About Running Out of Food in the Last Year:      Ran Out of Food in the Last Year:    Transportation Needs:      Lack of Transportation (Medical):      Lack of Transportation (Non-Medical):    Physical Activity:      Days of Exercise per Week:      Minutes of Exercise per Session:    Stress:      Feeling of Stress :    Social Connections:      Frequency of Communication with Friends and Family:      Frequency of Social Gatherings with Friends and Family:      Attends Mu-ism Services:      Active Member of Clubs or Organizations:      Attends Club or Organization Meetings:      Marital Status:    Intimate Partner Violence:      Fear of Current or Ex-Partner:      Emotionally Abused:      Physically Abused:      Sexually Abused:        10 point Review of Systems is negative      There were no vitals taken for this visit.    BMI= There is no height or weight on file to calculate BMI.    OBJECTIVE:  General appearance: Patient is alert and fully cooperative with history & exam.  No sign of distress is noted during the visit.  Vascular: Dorsalis pedis and posterior tibial pulses are palpable. There is no pedal hair growth bilaterally.  CFT < 3 sec from anterior tibial surface to distal digits bilaterally. There is no appreciable edema noted.  Dermatologic: Turgor and texture are within normal limits. No coloration or temperature changes. No primary or secondary lesions noted.  Neurologic: All epicritic and proprioceptive sensations are grossly intact bilaterally.  Musculoskeletal: All active and passive ankle, subtalar, midtarsal, and 1st MPJ range of motion are grossly intact without pain or crepitus, with the exception of none. Manual muscle  strength is within normal limits bilaterally. All dorsiflexors, plantarflexors, invertors, evertors are intact bilaterally. Tenderness present to the medial longitudinal arch bilaterally on palpation.  No tenderness to bilateral feet or ankles with range of motion.  There is severe flattening of the medial longitudinal arch noted bilaterally.  Calf is soft/non-tender without warmth/induration    Imaging:         No results found.         TREATMENT:  I have recommended a new pair of orthotics.  The patient is to return to the clinic as needed.        Austin Rivero; RYANN  [facility] Foot & Ankle Surgery/Podiatry

## 2021-07-21 ENCOUNTER — RECORDS - HEALTHEAST (OUTPATIENT)
Dept: ADMINISTRATIVE | Facility: CLINIC | Age: 71
End: 2021-07-21

## 2021-07-22 ENCOUNTER — RECORDS - HEALTHEAST (OUTPATIENT)
Dept: INTERNAL MEDICINE | Facility: CLINIC | Age: 71
End: 2021-07-22

## 2021-07-22 DIAGNOSIS — Z12.31 OTHER SCREENING MAMMOGRAM: ICD-10-CM

## 2021-07-28 ENCOUNTER — ANCILLARY PROCEDURE (OUTPATIENT)
Dept: GENERAL RADIOLOGY | Facility: CLINIC | Age: 71
End: 2021-07-28
Attending: FAMILY MEDICINE
Payer: COMMERCIAL

## 2021-07-28 ENCOUNTER — OFFICE VISIT (OUTPATIENT)
Dept: URGENT CARE | Facility: URGENT CARE | Age: 71
End: 2021-07-28
Payer: COMMERCIAL

## 2021-07-28 VITALS
HEART RATE: 73 BPM | SYSTOLIC BLOOD PRESSURE: 115 MMHG | WEIGHT: 179.2 LBS | TEMPERATURE: 97.7 F | BODY MASS INDEX: 27.25 KG/M2 | DIASTOLIC BLOOD PRESSURE: 62 MMHG | OXYGEN SATURATION: 98 %
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
## 2021-07-28 DIAGNOSIS — S50.01XA CONTUSION OF RIGHT ELBOW, INITIAL ENCOUNTER: ICD-10-CM

## 2021-07-28 DIAGNOSIS — S00.33XA CONTUSION OF NOSE, INITIAL ENCOUNTER: ICD-10-CM
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
## 2021-07-28 DIAGNOSIS — S80.00XA CONTUSION OF KNEE, UNSPECIFIED LATERALITY, INITIAL ENCOUNTER: ICD-10-CM

## 2021-07-28 PROCEDURE — 73562 X-RAY EXAM OF KNEE 3: CPT | Mod: 59 | Performed by: RADIOLOGY

## 2021-07-28 PROCEDURE — 73080 X-RAY EXAM OF ELBOW: CPT | Mod: 59 | Performed by: RADIOLOGY

## 2021-07-28 PROCEDURE — 70160 X-RAY EXAM OF NASAL BONES: CPT | Performed by: RADIOLOGY

## 2021-07-28 PROCEDURE — 99214 OFFICE O/P EST MOD 30 MIN: CPT | Performed by: FAMILY MEDICINE

## 2021-07-29 NOTE — PROGRESS NOTES
Assessment & Plan     ICD-10-CM    1. Fall, initial encounter  W19.XXXA XR Nasal Bones 3 Views     XR Elbow Right G/E 3 Views     XR Knee Right 3 Views     XR Knee Left 3 Views   2. Contusion of right elbow, initial encounter  S50.01XA    3. Contusion of nose, initial encounter  S00.33XA    4. Contusion of knee, unspecified laterality, initial encounter  S80.00XA      No fractures seen in any of the injured areas on x-rays tonight.    Patient Instructions   Arnica gel:  Use on sore areas up to three times daily.    Use Tylenol as needed for soreness.    If the radiologist sees any fractures that I did not see, we will call you tomorrow.    Return in about 1 week (around 8/4/2021).    Alma David MD  [facility] FAMILIA Dasilva is a 71 year old female who presents to clinic today for the following health issues:  Chief Complaint   Patient presents with     FELL ON FACE HAD BLOODY NOSE     PER PT FELL ON 7/22/2021 PT HAD FELL ON HER FACE HURT BOTH KNEES,ARMS AND NOSE WAS BLEEDING WANT TO MAKE SURE HER NOSE IS NOT BROKEN     [place] [employer] on 7[place]22 and tripped over the edge of a pallet that was sticking out.  Happened so fast she didn't really catch herself.  Struck face, right elbow and both knees.  Nose bled a lot and continued on and off for a few days.  No LOC.  Still having pain in nose, elbow and both knees (left more than right).  No locking of elbow or knees.  Just feeling sore and stiff.        Objective    /62   Pulse 73   Temp 97.7  F (36.5  C)   Wt 81.3 kg (179 lb 3.2 oz)   SpO2 98%   BMI 27.25 kg/m    Nasal bridge is slightly swollen, slightly ecchymotic, and tender. Normal nares.  Septum midline and not swollen.  No rhinorrhea.    R elbow a little tender on super aspect of medial epicondyle and over the olecranon process.  There is no ecchymosis or swelling here.  Full flexion and extension.  R knee tender along medial joint line and patellar tendon.   Negative Lachman's.  Full ROM.  L knee tender along medial patella and medial joint line.  Negative Lachman's.  Full ROM.    Nasal bones:  I do not see any fractures on these 3 views.  Right elbow:  I do not see any fractures on these 3 views.  I do not see any effusion.  Right knee:  I do not see any fractures on these 3 views.    Left knee:  I do not see any fractures on these 3 views.

## 2021-07-29 NOTE — PATIENT INSTRUCTIONS
Arnica gel:  Use on sore areas up to three times daily.    Use Tylenol as needed for soreness.    If the radiologist sees any fractures that I did not see, we will call you tomorrow.

## 2021-09-12 ENCOUNTER — HEALTH MAINTENANCE LETTER (OUTPATIENT)
Age: 71
End: 2021-09-12

## 2022-01-02 ENCOUNTER — HEALTH MAINTENANCE LETTER (OUTPATIENT)
Age: 72
End: 2022-01-02

## 2022-11-19 ENCOUNTER — HEALTH MAINTENANCE LETTER (OUTPATIENT)
Age: 72
End: 2022-11-19

## 2023-01-03 ENCOUNTER — TRANSFERRED RECORDS (OUTPATIENT)
Dept: HEALTH INFORMATION MANAGEMENT | Facility: CLINIC | Age: 73
End: 2023-01-03

## 2023-01-05 ENCOUNTER — TRANSFERRED RECORDS (OUTPATIENT)
Dept: HEALTH INFORMATION MANAGEMENT | Facility: CLINIC | Age: 73
End: 2023-01-05

## 2023-01-17 ENCOUNTER — TRANSFERRED RECORDS (OUTPATIENT)
Dept: HEALTH INFORMATION MANAGEMENT | Facility: CLINIC | Age: 73
End: 2023-01-17

## 2023-05-12 ENCOUNTER — TRANSFERRED RECORDS (OUTPATIENT)
Dept: HEALTH INFORMATION MANAGEMENT | Facility: CLINIC | Age: 73
End: 2023-05-12

## 2023-06-01 ENCOUNTER — HEALTH MAINTENANCE LETTER (OUTPATIENT)
Age: 73
End: 2023-06-01

## 2023-06-28 ENCOUNTER — TRANSFERRED RECORDS (OUTPATIENT)
Dept: HEALTH INFORMATION MANAGEMENT | Facility: CLINIC | Age: 73
End: 2023-06-28

## 2023-06-29 ENCOUNTER — TRANSCRIBE ORDERS (OUTPATIENT)
Dept: OTHER | Age: 73
End: 2023-06-29

## 2023-06-29 DIAGNOSIS — R26.89 IMBALANCE: Primary | ICD-10-CM

## 2023-07-17 ENCOUNTER — TRANSCRIBE ORDERS (OUTPATIENT)
Dept: OTHER | Age: 73
End: 2023-07-17

## 2023-07-17 DIAGNOSIS — R42 DIZZINESS AND GIDDINESS: Primary | ICD-10-CM

## 2023-07-20 ENCOUNTER — THERAPY VISIT (OUTPATIENT)
Dept: OCCUPATIONAL THERAPY | Facility: REHABILITATION | Age: 73
End: 2023-07-20
Payer: COMMERCIAL

## 2023-07-20 DIAGNOSIS — R26.81 UNSTEADINESS: ICD-10-CM

## 2023-07-20 DIAGNOSIS — Z78.9 DECREASED ACTIVITIES OF DAILY LIVING (ADL): ICD-10-CM

## 2023-07-20 DIAGNOSIS — R42 DIZZINESS: Primary | ICD-10-CM

## 2023-07-20 PROCEDURE — 97112 NEUROMUSCULAR REEDUCATION: CPT | Mod: GO | Performed by: OCCUPATIONAL THERAPIST

## 2023-07-20 PROCEDURE — 97165 OT EVAL LOW COMPLEX 30 MIN: CPT | Mod: GO | Performed by: OCCUPATIONAL THERAPIST

## 2023-07-20 NOTE — PROGRESS NOTES
"OCCUPATIONAL THERAPY EVALUATION  Type of Visit: Evaluation    See electronic medical record for Abuse and Falls Screening details.    Subjective      Presenting condition or subjective complaint:    Patient referred by ENT for vestibular rehab. She initially had onset of fullness in head and ears back in December 2022 and was being treated by ENT for this. Audiologic testing revealed significant left hearing loss. Patient then had Covid in late Decmeber(2022). She fell on 12-31-22 and reports that after her fall, she had onset of dizziness and unsteadiness that has not resolved. ENT ordered VNG which showed central and non-localizing findings. She denies ear pain.   Date of onset: 07/17/23 (date of referral to OT)    Relevant medical history:   none  Dates & types of surgery:  colon resection 1990, hysterectomy 2017, rotator cuff repair 2018 and hernia repair in 2019.    Prior diagnostic imaging/testing results:     MRI and CT scan. Hearing tests showing left hearing loss. VNG showed central and non-localizing findings as follows:gaze nystagmus in vision denied condition. Leftbeat with gaze up, left and center. Upbeat with gaze right and center. Positional nystagmus showed upbeat in all positions and leftbeat on head right.    Prior therapy history for the same diagnosis, illness or injury:    no    Patient goals for therapy:  \"walk without balance issues\"    Pain assessment: Pain denied     Objective     VISUAL SKILLS-Patient had right cataract removed last week and will be having the left cataract surgery next Monday.  Visual Acuity: Wears glasses    SENSATION: UE Sensation WNL, LE Sensation WNL    BALANCE: Patient reports that her balance feels \"off\"    FUNCTIONAL MOBILITY  Assistive Device(s): None    INSTRUMENTAL ACTIVITIES OF DAILY LIVING (IADL): Patient is independent with all BADL's and IADL's.       VESTIBULAR EVALUATION- Patient had VNG. See results above.      Infrared Goggle Exam Vestibular Suppressant " in Last 24 Hours? No  Exam Completed With: Room light    Left Right   Sidelying Test Negative Negative     Assessment & Plan   CLINICAL IMPRESSIONS  Medical Diagnosis: dizziness    Treatment Diagnosis: dizziness, unstadiness, decreased ADL and IADL function    Impression/Assessment: Symptoms with unclear etiology. Patient instructed on initial HEP for vestibular adaptation and substitution exercises.     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 and management  Clinical Decision Making (Complexity): Low complexity    PLAN OF CARE  Treatment Interventions:  Interventions: Neuromuscular Re-education    Long Term Goals   OT Goal 1  Goal Description: Patient will be able to turn head without dizziness  Target Date: 10/12/23  OT Goal 2  Goal Description: Patient will be able walk with head motion without LOB or dizziness  Target Date: 10/12/23  OT Goal 3  Goal Description: Patient will be able to bend without dizziness  Target Date: 10/12/23      Frequency of Treatment: once a week  Duration of Treatment: 12 weeks     Recommended Referrals to Other Professionals: None  Education Assessment: Learner/Method: Patient     Risks and benefits of evaluation/treatment have been explained.   Patient agrees with Plan of Care.     Evaluation Time:    OT Eval, Low Complexity Minutes (69606): 30       Signing Clinician: Verónica Collins OT

## 2023-07-31 ENCOUNTER — LAB REQUISITION (OUTPATIENT)
Dept: LAB | Facility: CLINIC | Age: 73
End: 2023-07-31
Payer: COMMERCIAL

## 2023-07-31 DIAGNOSIS — D22.71 MELANOCYTIC NEVI OF RIGHT LOWER LIMB, INCLUDING HIP: ICD-10-CM

## 2023-07-31 PROCEDURE — 88305 TISSUE EXAM BY PATHOLOGIST: CPT | Mod: 26 | Performed by: DERMATOLOGY

## 2023-07-31 PROCEDURE — 88305 TISSUE EXAM BY PATHOLOGIST: CPT | Mod: TC,ORL | Performed by: DERMATOLOGY

## 2023-08-03 ENCOUNTER — THERAPY VISIT (OUTPATIENT)
Dept: OCCUPATIONAL THERAPY | Facility: REHABILITATION | Age: 73
End: 2023-08-03
Payer: COMMERCIAL

## 2023-08-03 DIAGNOSIS — R42 DIZZINESS: Primary | ICD-10-CM

## 2023-08-03 DIAGNOSIS — Z78.9 DECREASED ACTIVITIES OF DAILY LIVING (ADL): ICD-10-CM

## 2023-08-03 DIAGNOSIS — R26.81 UNSTEADINESS: ICD-10-CM

## 2023-08-03 PROCEDURE — 97112 NEUROMUSCULAR REEDUCATION: CPT | Mod: GO | Performed by: OCCUPATIONAL THERAPIST

## 2023-08-15 LAB
PATH REPORT.COMMENTS IMP SPEC: NORMAL
PATH REPORT.FINAL DX SPEC: NORMAL
PATH REPORT.GROSS SPEC: NORMAL
PATH REPORT.MICROSCOPIC SPEC OTHER STN: NORMAL
PATH REPORT.RELEVANT HX SPEC: NORMAL

## 2023-08-18 ENCOUNTER — OFFICE VISIT (OUTPATIENT)
Dept: URGENT CARE | Facility: URGENT CARE | Age: 73
End: 2023-08-18
Payer: COMMERCIAL

## 2023-08-18 ENCOUNTER — ANCILLARY PROCEDURE (OUTPATIENT)
Dept: GENERAL RADIOLOGY | Facility: CLINIC | Age: 73
End: 2023-08-18
Attending: FAMILY MEDICINE
Payer: COMMERCIAL

## 2023-08-18 VITALS
OXYGEN SATURATION: 97 % | HEIGHT: 68 IN | WEIGHT: 155 LBS | BODY MASS INDEX: 23.49 KG/M2 | HEART RATE: 71 BPM | DIASTOLIC BLOOD PRESSURE: 64 MMHG | TEMPERATURE: 98 F | RESPIRATION RATE: 15 BRPM | SYSTOLIC BLOOD PRESSURE: 118 MMHG

## 2023-08-18 DIAGNOSIS — M25.551 HIP PAIN, RIGHT: ICD-10-CM

## 2023-08-18 DIAGNOSIS — M53.3 PAIN OF RIGHT SACROILIAC JOINT: ICD-10-CM

## 2023-08-18 DIAGNOSIS — M54.41 ACUTE RIGHT-SIDED LOW BACK PAIN WITH RIGHT-SIDED SCIATICA: Primary | ICD-10-CM

## 2023-08-18 PROCEDURE — 72202 X-RAY EXAM SI JOINTS 3/> VWS: CPT | Mod: TC | Performed by: RADIOLOGY

## 2023-08-18 PROCEDURE — 73502 X-RAY EXAM HIP UNI 2-3 VIEWS: CPT | Mod: TC | Performed by: RADIOLOGY

## 2023-08-18 PROCEDURE — 99215 OFFICE O/P EST HI 40 MIN: CPT | Performed by: FAMILY MEDICINE

## 2023-08-18 PROCEDURE — 72100 X-RAY EXAM L-S SPINE 2/3 VWS: CPT | Mod: TC | Performed by: RADIOLOGY

## 2023-08-18 RX ORDER — CYCLOBENZAPRINE HCL 5 MG
5 TABLET ORAL
Qty: 30 TABLET | Refills: 0 | Status: SHIPPED | OUTPATIENT
Start: 2023-08-18 | End: 2024-04-03

## 2023-08-18 RX ORDER — PREDNISONE 20 MG/1
40 TABLET ORAL DAILY
Qty: 10 TABLET | Refills: 0 | Status: SHIPPED | OUTPATIENT
Start: 2023-08-18 | End: 2023-08-23

## 2023-08-18 NOTE — PROGRESS NOTES
ASSESSMENT/PLAN:      ICD-10-CM    1. Acute right-sided low back pain with right-sided sciatica  M54.41 cyclobenzaprine (FLEXERIL) 5 MG tablet     predniSONE (DELTASONE) 20 MG tablet      2. Hip pain, right  M25.551 XR Hip Right 2-3 Views     cyclobenzaprine (FLEXERIL) 5 MG tablet      3. Pain of right sacroiliac joint  M53.3 XR Lumbar Spine 2/3 Views     XR Sacroiliac Joint G/E 3 Views     cyclobenzaprine (FLEXERIL) 5 MG tablet          Patient Instructions     Bring MRI report and tell  copy is for Dr. Hernandez     Hold on starting steroid until xray readings are completed     If pain is worse to [facility] urgent care    Call and make an appointment with [facility] if pain is the same   Appointment for   For right hip pain, si joint pain on right, pain with palpation and radiating down lateral and back of right thigh         Reviewed medication instructions and side effects. Follow up if experiences side effects.     I reviewed supportive care, otc meds to use if needed, expected course, and signs of concern.  Follow up as needed or if she does not improve within  1-2 days or if worsens in any way.  Reviewed red flag symptoms and is to go to the ER if experiences any of these.     The use of Dragon/AKT dictation services may have been used to construct the content in this note; any grammatical or spelling errors are non-intentional. Please contact the author of this note directly if you are in need of any clarification.      On the day of the encounter, time spend on chart review, patient visit, review of testing, documentation was 45  minutes              Patient presents with:  Urgent Care  Musculoskeletal Problem: Right leg pain, starts in buttock and goes down to knee         Subjective     Vidya Brink is a 73 year old female who presents to clinic today for the following health issues:    HPI     Musculoskeletal problem/pain    Duration: onset 3 days  "ago  Description  Location: r hip and buttock -throbbing pain in hip yesterday, now in R buttock going down her right leg to knee   Intensity:  moderate, severe  Accompanying signs and symptoms: radiation of pain to lateral thigh to knee   History  Previous similar problem: no -intermittent low back pain, recent MRI of thoracic spine and lumbar spine due to onset of unsteady gait, concerned may have spinal stenosis-neg for stenosis ( copy of MRI report reviewed and sent for scanning ) -[facility]   [facility] past   Previous evaluation:  none  Precipitating or alleviating factors:  Trauma or overuse: no   Aggravating factors include: sitting, standing, walking, climbing stairs, and lifting  Therapies tried and outcome:       Past Medical History:   Diagnosis Date    Arthritis     Colon cancer (H) 1989, 1990    Fibrocystic breast     Pneumonia      Social History     Tobacco Use    Smoking status: Never    Smokeless tobacco: Never   Substance Use Topics    Alcohol use: Not on file       Current Outpatient Medications   Medication Sig Dispense Refill    CALCIUM-VITAMIN D PO Take by mouth daily      cholecalciferol 25 MCG (1000 UT) TABS Take 1,000 Units by mouth      cyclobenzaprine (FLEXERIL) 5 MG tablet Take 1 tablet (5 mg) by mouth at bedtime as needed, may repeat once for muscle spasms 30 tablet 0    Multiple Vitamins-Minerals (HAIR SKIN AND NAILS FORMULA) TABS       predniSONE (DELTASONE) 20 MG tablet Take 2 tablets (40 mg) by mouth daily for 5 days 10 tablet 0    RISEdronate (ACTONEL) 35 MG tablet Take 35 mg by mouth      ketoconazole (NIZORAL) 2 % external cream APPLY CREAM TOPICALLY TWICE DAILY TO FEET       Allergies   Allergen Reactions    Fosamax [Alendronate]              ROS are negative, except as otherwise noted HPI      Objective    /64   Pulse 71   Temp 98  F (36.7  C) (Temporal)   Resp 15   Ht 1.727 m (5' 8\")   Wt 70.3 kg (155 lb)   SpO2 97%   BMI 23.57 " kg/m    Body mass index is 23.57 kg/m .  Physical Exam   GENERAL: alert and mild distress  MS: no gross musculoskeletal defects noted, no edema  Right hip -non tender over greater trochanter, pain with internal and external rotation of the hip and feels internal and external rotation reproduces pain into the right buttock and down the posterior thigh, pain in posterior thigh with flexion and extension   NEURO: Normal strength and tone, mentation intact and speech normal  sensory exam grossly normal, cranial nerves 2-12 intact, DTR's normal and symmetric + 2 at knees, gait normal including heel/toe/  BACK: no CVA tenderness, no paralumbar tenderness, tender over right SI joint, positive straight leg raise on right to the knee , negative straight leg raise on the left normal heel toe gait, normal strength, DTRs +2 at the knees bilateral and symmetric      Diagnostic Test Results:  Labs reviewed in Epic  Results for orders placed or performed in visit on 08/18/23   XR Sacroiliac Joint G/E 3 Views     Status: None    Narrative    XR SACROILIAC JOINT G/E 3 VIEWS 8/18/2023 12:05 PM     HISTORY: Pain of right sacroiliac joint    COMPARISON: None.         Impression    IMPRESSION: The SI joints are negative for sacroiliitis, ankylosis, or  diastases.    NELSY RIOS MD         SYSTEM ID:  [id number]   Results for orders placed or performed in visit on 08/18/23   XR Lumbar Spine 2/3 Views     Status: None    Narrative    LUMBAR SPINE TWO TO THREE VIEWS  8/18/2023 12:05 PM     COMPARISON: None.    HISTORY: Pain.      Impression    IMPRESSION: Five lumbar type vertebrae. Grade 1 degenerative  anterolisthesis of L3 upon L4 and L4 upon L5. Alignment otherwise  normal. Vertebral body heights normal. No evidence for fracture. Facet  arthropathy throughout the lumbar spine.    DAVID LEON MD         SYSTEM ID:  [id number]   Results for orders placed or performed in visit on 08/18/23   XR Hip Right 2-3 Views     Status: None     Narrative    XR HIP RIGHT 2-3 VIEWS 8/18/2023 12:05 PM     HISTORY: Hip pain, right    COMPARISON: None.       Impression    IMPRESSION: Degenerative change of the right hip joint but no evidence  for fracture or dislocation. The right hemipelvis is negative.    NELSY RIOS MD         SYSTEM ID:  [id number]

## 2023-08-18 NOTE — PATIENT INSTRUCTIONS
Bring MRI report and tell  copy is for Dr. Hernandez     Hold on starting steroid until xray readings are completed     If pain is worse to [facility] urgent care    Call and make an appointment with [facility] if pain is the same   Appointment for   For right hip pain, si joint pain on right, pain with palpation and radiating down lateral and back of right thigh

## 2023-08-24 ENCOUNTER — THERAPY VISIT (OUTPATIENT)
Dept: OCCUPATIONAL THERAPY | Facility: REHABILITATION | Age: 73
End: 2023-08-24
Payer: COMMERCIAL

## 2023-08-24 DIAGNOSIS — R26.81 UNSTEADINESS: ICD-10-CM

## 2023-08-24 DIAGNOSIS — R42 DIZZINESS: Primary | ICD-10-CM

## 2023-08-24 DIAGNOSIS — Z78.9 DECREASED ACTIVITIES OF DAILY LIVING (ADL): ICD-10-CM

## 2023-08-24 PROCEDURE — 97112 NEUROMUSCULAR REEDUCATION: CPT | Mod: GO | Performed by: OCCUPATIONAL THERAPIST

## 2023-09-21 ENCOUNTER — THERAPY VISIT (OUTPATIENT)
Dept: OCCUPATIONAL THERAPY | Facility: REHABILITATION | Age: 73
End: 2023-09-21
Payer: COMMERCIAL

## 2023-09-21 DIAGNOSIS — R26.81 UNSTEADINESS: ICD-10-CM

## 2023-09-21 DIAGNOSIS — Z78.9 DECREASED ACTIVITIES OF DAILY LIVING (ADL): ICD-10-CM

## 2023-09-21 DIAGNOSIS — R42 DIZZINESS: Primary | ICD-10-CM

## 2023-09-21 PROCEDURE — 97112 NEUROMUSCULAR REEDUCATION: CPT | Mod: GO | Performed by: OCCUPATIONAL THERAPIST

## 2023-09-25 NOTE — TELEPHONE ENCOUNTER
Action 9/25/23 MV 2.37pm   Action Taken Imaging request faxed to [facility]     Action 10/6/23 MV 1.53pm   Action Taken Images resolved in PACS       RECORDS RECEIVED FROM: external   REASON FOR VISIT: Imbalance    Date of Appt: 1/4/24   NOTES (FOR ALL VISITS) STATUS DETAILS   OFFICE NOTE from referring provider Care Everywhere Dr Luisana Garza @ [facility]:  6/27/23 telephone encounter  6/14/23   OFFICE NOTE from other specialist Care Everywhere Dr Wes Purcell @  Neurology:  4/19/23    Dr Arlin Smith @ [facility]:  3/28/23  1/5/23   MEDICATION LIST Care Everywhere    IMAGING  (FOR ALL VISITS)     MRI (HEAD, NECK, SPINE) PACS [facility]:  MRI Thoracic Spine 5/12/23  MRI Lumbar Spine 5/12/23  MRI Brain 1/17/23

## 2023-10-03 ENCOUNTER — TRANSCRIBE ORDERS (OUTPATIENT)
Dept: OTHER | Age: 73
End: 2023-10-03

## 2023-10-03 DIAGNOSIS — R26.89 IMBALANCE: Primary | ICD-10-CM

## 2023-10-09 ENCOUNTER — TRANSFERRED RECORDS (OUTPATIENT)
Dept: HEALTH INFORMATION MANAGEMENT | Facility: CLINIC | Age: 73
End: 2023-10-09

## 2023-11-21 ENCOUNTER — TRANSFERRED RECORDS (OUTPATIENT)
Dept: HEALTH INFORMATION MANAGEMENT | Facility: CLINIC | Age: 73
End: 2023-11-21

## 2023-12-09 NOTE — PROGRESS NOTES
DISCHARGE  Reason for Discharge: Patient has failed to schedule further appointments.    Equipment Issued: none    Discharge Plan: Patient to continue home program.    Referring Provider:  Referred Self       09/21/23 0500   Appointment Info   Treating Provider Imelda Collins OTR/L   Visits Used 4   Medical Diagnosis dizziness   OT Tx Diagnosis dizziness, unstadiness, decreased ADL and IADL function   Precautions/Limitations falls   Progress Note/Certification   Start Of Care Date 07/20/23   Onset of Illness/Injury or Date of Surgery 07/17/23  (date of referral to OT)   Therapy Frequency once a week   Predicted Duration 12 weeks   Certification date from 07/20/23   Certification date to 10/12/23   KX Modifier Statement I certify the need for these services furnished under this plan of treatment and while under my care.  (Physician co-signature of this document indicates review and certification of the therapy plan)   Progress Note Due Date 10/12/23   OT Goal 1   Goal Description Patient will be able to turn head without dizziness   Target Date 10/12/23   OT Goal 2   Goal Description Patient will be able walk with head motion without LOB or dizziness   Target Date 10/12/23   OT Goal 3   Goal Description Patient will be able to bend without dizziness   Target Date 10/12/23   Subjective Report   Subjective Report Patient reports that he symptoms are improving and she is having more good days. She states that she doesn't really have dizziness but is off balance. Patient saw neuro-opthalmologist and they have no concerns about the vision/eyes   Neuromuscular Re-education   Neuromuscular Re-ed Minutes (06030) 40   Neuromuscular Re-education Neuro Re-ed 10   Neuro Re-ed 1 HEP   Neuro Re-ed 1 - Details Reviewed HEP (vestibular adaptation and substitution exercises) including , horizontal targets-50 seconds, NSEC/PSEC, ball circles-8 circles, wall rolls-1.5 rolls each direction, gait with horizontal head motion.  Progressed to include figure 8's. Patient performing exercises 3-4 times a day.  Patient instructed to decreased the following exercises to once a day:horizontal X1 viewing-50 seconds   Skilled Intervention vestibular adaptation and substitution exercises   Education   Learner/Method Patient   Plan   Plan for next session progress HEP. Patient reports mostly balance issues and need to instruct on additional balance ex's(SLS, partial tandem, crossovers EO/EC etc) when right leg/hip pain settle down   Comments   Comments VNG showed central and non-localizing findings as follows:gaze nystagmus in vision denied condition. Leftbeat with gaze up, left and center. Upbeat with gaze right and center. Positional nystagmus showed upbeat in all positions and leftbeat on head right.   Total Session Time   Timed Code Treatment Minutes 40   Total Treatment Time (sum of timed and untimed services) 40

## 2024-01-04 ENCOUNTER — PRE VISIT (OUTPATIENT)
Dept: NEUROLOGY | Facility: CLINIC | Age: 74
End: 2024-01-04

## 2024-01-04 ENCOUNTER — OFFICE VISIT (OUTPATIENT)
Dept: NEUROLOGY | Facility: CLINIC | Age: 74
End: 2024-01-04
Attending: FAMILY MEDICINE
Payer: COMMERCIAL

## 2024-01-04 VITALS
BODY MASS INDEX: 23.32 KG/M2 | DIASTOLIC BLOOD PRESSURE: 81 MMHG | SYSTOLIC BLOOD PRESSURE: 126 MMHG | HEART RATE: 80 BPM | OXYGEN SATURATION: 100 % | WEIGHT: 153.4 LBS

## 2024-01-04 DIAGNOSIS — R26.89 IMBALANCE: ICD-10-CM

## 2024-01-04 DIAGNOSIS — R29.2 HYPERREFLEXIA: Primary | ICD-10-CM

## 2024-01-04 PROCEDURE — 99205 OFFICE O/P NEW HI 60 MIN: CPT | Performed by: PSYCHIATRY & NEUROLOGY

## 2024-01-04 RX ORDER — RISEDRONATE SODIUM 5 MG/1
5 TABLET, FILM COATED ORAL
COMMUNITY
End: 2024-04-03

## 2024-01-04 ASSESSMENT — PAIN SCALES - GENERAL: PAINLEVEL: MILD PAIN (2)

## 2024-01-04 NOTE — PATIENT INSTRUCTIONS
Referral to Dr Alfaro  Consideration of genetic testing; I am not certain of coverage but can schedule a consultation. It would help to have your siblings' medical records, or their consent to speak with their neurologists.  Cervical MRI.      Please call [facility] @ [phone] for questions or concerns during regular business hours. For a more efficient way to communicate, use Tasqe and address the message to your physician. Remember, MyChart is only read during business hours. Do not leave urgent messages on voicemail or Tasqe. If situation is urgent, contact the Neurology Clinic @ [phone] and ask to speak to a Triage Nurse or Call 911 or visit an Emergency Department.     Please call your pharmacy if you need a medication refill. They will send us an electronic message.

## 2024-01-04 NOTE — LETTER
1/4/2024       RE: Vidya Brink  [address]  [facility] [address]     Dear Colleague,    Thank you for referring your patient, Vidya Brink, to the [facility] [place] [facility]. Please see a copy of my visit note below.    Vidya Brink is 73 year old woman with imbalance.  This began 2 weeks after a fall in January 20.  This was a mechanical fall in which she landed on her face.  She sustained bruises but no serious injury and no loss of conscious.  She identifies no other possible precipitating circumstance, although in the course of reviewing her medical history, is the case that she had sudden loss of hearing in 1 year, longstanding tinnitus, and within the past year a right lower extremity radiculopathy with pain and localized numbness.    After brain imaging for this problem we identified ventriculomegaly she underwent lumbar puncture.  Opening pressure was normal.  However there is no improvement in her gait after 28 cc of spinal fluid were drained.  She has also undergone imaging of the thoracic and lumbar spine, demonstrating no structural explanations for her imbalance.    She describes the imbalance as as if she has been was walking on a boat.  It is precipitated by head movement or body movement.  It is not present at rest.  She does not associated temporally with her tinnitus or hearing loss.    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  She has had resection of local lesions in the rectum and colon without metastasis.  She has never undergone chemotherapy or radiation therapy.    At the onset of this problem she experienced a 30 pound weight loss which has since stabilized.  She reports some change in cognitive facility which may be age-related; formal cognitive testing demonstrated no abnormalities.  She has some  "bladder and bowel urgency but denies incontinence. She denies a chronic cough.  She denies positive or negative sensory symptoms.    Family history: She has 1 brother with diagnosed neurological condition that is left him homebound and requiring a gait aid.  He has imbalance as part of this problem.  He has not she has 1 sister with imbalance and another sister with chronic cough but no balance difficulty to her knowledge.  She has 2 healthy children neither of whom have any neurological symptoms.  Her mother  at age 98 without any neurological problems.  Her father  at age 73 of colon cancer.  Her father had 2 brothers.        Mental state: Alert, appropriate, speech, language, and thought content normal.     Cranial nerves II-XII: overshoot with turning head to right on HIT. Otherwise normal.    Sensory examination:     Right Left   Light touch Normal Normal   Vibration (timed) 5 2   Vibration (Rydell-Seiffer)     Temp     Pin Normal Normal   Pos     Legend:   MM = medial malleolus, TT = tibial tuberosity, K = patella, MCP = MCP joint  MF = mid-foot, DC = distal calf, MC = mid calf, PC = proximal calf      Motor examination:     Right Left   Shoulder abduction  5 5   Elbow extension 5 5   Elbow flexion 5 5   Wrist extension  5 5   Finger extension 5 5   FDI 4+ 5   APB 4 4+   Hip flexion 5 5   Knee flexion 5 5   Knee extension 5 5   Dorsiflexion 5 5   Plantar flexion 5 5   A=atrophy    Tone normal     Reflexes:   Right Left   Biceps 2+ 2+   BRD 3 2+   Triceps 2+ 2+   Bekah 0 0   Patellar 2+ 2+   Achilles 2+ 2+   Plantar Flexor Flexor   Clonus Absent Absent      \"2+\" indicates brisk reflexes; crossed adductors present. Masseter reflex absent.    Coordination:  Finger-nose normal.  Heel-shin normal.  RRMs normal.    Gait:  Limps, possibly related to LLD or antalgic. Erect posture. Turns well. Pull test negative. Difficulty with tandem. Romberg negative, and only mildly unsteady with feet together and eyes " open. Able to walk on heels and on toes.     Impression:  Imbalance with head movement; possible midline ataxia as well. Brain MRI not reviewed but reportedly has disproportionate ventriculomegaly without convincing features of NPH. Moderate limb hyperreflexia. Family history of imbalance and unexplained cough, as can occur with CANVAS, but without family history of neuropathy and with a sensory examination that is not clearly abnormal for a person of this age.     Recommendations (copied from patient instructions):  Referral to Dr Alfaro  Consideration of genetic testing; I am not certain of coverage but can schedule a consultation. It would help to have your siblings' medical records, or their consent to speak with their neurologists.  Cervical MRI.      62 minutes spent on the date of the encounter on chart review, history and examination, documentation and further activities as noted above.        Again, thank you for allowing me to participate in the care of your patient.      Sincerely,    Ray Hartley MD

## 2024-01-04 NOTE — PROGRESS NOTES
Vidya Brink is 73 year old woman with imbalance.  This began 2 weeks after a fall in January 20.  This was a mechanical fall in which she landed on her face.  She sustained bruises but no serious injury and no loss of conscious.  She identifies no other possible precipitating circumstance, although in the course of reviewing her medical history, is the case that she had sudden loss of hearing in 1 year, longstanding tinnitus, and within the past year a right lower extremity radiculopathy with pain and localized numbness.    After brain imaging for this problem we identified ventriculomegaly she underwent lumbar puncture.  Opening pressure was normal.  However there is no improvement in her gait after 28 cc of spinal fluid were drained.  She has also undergone imaging of the thoracic and lumbar spine, demonstrating no structural explanations for her imbalance.    She describes the imbalance as as if she has been was walking on a boat.  It is precipitated by head movement or body movement.  It is not present at rest.  She does not associated temporally with her tinnitus or hearing loss.    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  She has had resection of local lesions in the rectum and colon without metastasis.  She has never undergone chemotherapy or radiation therapy.    At the onset of this problem she experienced a 30 pound weight loss which has since stabilized.  She reports some change in cognitive facility which may be age-related; formal cognitive testing demonstrated no abnormalities.  She has some bladder and bowel urgency but denies incontinence. She denies a chronic cough.  She denies positive or negative sensory symptoms.    Family history: She has 1 brother with diagnosed neurological condition that is left him homebound and requiring a gait aid.  He has imbalance as part of this problem.  He has not she has 1  "sister with imbalance and another sister with chronic cough but no balance difficulty to her knowledge.  She has 2 healthy children neither of whom have any neurological symptoms.  Her mother  at age 98 without any neurological problems.  Her father  at age 73 of colon cancer.  Her father had 2 brothers.        Mental state: Alert, appropriate, speech, language, and thought content normal.     Cranial nerves II-XII: overshoot with turning head to right on HIT. Otherwise normal.    Sensory examination:     Right Left   Light touch Normal Normal   Vibration (timed) 5 2   Vibration (Rydell-Seiffer)     Temp     Pin Normal Normal   Pos     Legend:   MM = medial malleolus, TT = tibial tuberosity, K = patella, MCP = MCP joint  MF = mid-foot, DC = distal calf, MC = mid calf, PC = proximal calf      Motor examination:     Right Left   Shoulder abduction  5 5   Elbow extension 5 5   Elbow flexion 5 5   Wrist extension  5 5   Finger extension 5 5   FDI 4+ 5   APB 4 4+   Hip flexion 5 5   Knee flexion 5 5   Knee extension 5 5   Dorsiflexion 5 5   Plantar flexion 5 5   A=atrophy    Tone normal     Reflexes:   Right Left   Biceps 2+ 2+   BRD 3 2+   Triceps 2+ 2+   Bekah 0 0   Patellar 2+ 2+   Achilles 2+ 2+   Plantar Flexor Flexor   Clonus Absent Absent      \"2+\" indicates brisk reflexes; crossed adductors present. Masseter reflex absent.    Coordination:  Finger-nose normal.  Heel-shin normal.  RRMs normal.    Gait:  Limps, possibly related to LLD or antalgic. Erect posture. Turns well. Pull test negative. Difficulty with tandem. Romberg negative, and only mildly unsteady with feet together and eyes open. Able to walk on heels and on toes.     I personally reviewed laboratory studies. B12 level was low normal with normal MMA. Vitamin E level was marginally reduced. Other labs unremarkable.  I personally reviewed reports of brain, thoracic, and lumbosacral imaging. Images not available in EHR. "     Impression:  Imbalance with head movement; possible midline ataxia as well. Brain MRI not reviewed but reportedly has disproportionate ventriculomegaly without convincing features of NPH. Moderate limb hyperreflexia. Family history of imbalance and unexplained cough, as can occur with CANVAS, but without family history of neuropathy and with a sensory examination that is not clearly abnormal for a person of this age.     Recommendations (copied from patient instructions):  Referral to Dr Alfaro  Consideration of genetic testing; I am not certain of coverage but can schedule a consultation. It would help to have your siblings' medical records, or their consent to speak with their neurologists.  Cervical MRI.      Ray Hartley M.D.    Addendum: Ms Brink indicated after the visit that her brother does have a chronic cough.    62 minutes spent on the date of the encounter on chart review, history and examination, documentation and further activities as noted above.

## 2024-01-05 ENCOUNTER — MYC MEDICAL ADVICE (OUTPATIENT)
Dept: NEUROLOGY | Facility: CLINIC | Age: 74
End: 2024-01-05
Payer: COMMERCIAL

## 2024-01-30 ENCOUNTER — TRANSFERRED RECORDS (OUTPATIENT)
Dept: HEALTH INFORMATION MANAGEMENT | Facility: CLINIC | Age: 74
End: 2024-01-30
Payer: COMMERCIAL

## 2024-02-22 ENCOUNTER — TELEPHONE (OUTPATIENT)
Dept: NEUROLOGY | Facility: CLINIC | Age: 74
End: 2024-02-22
Payer: COMMERCIAL

## 2024-02-22 NOTE — TELEPHONE ENCOUNTER
Records Requested     February 22, 2024 4:13 PM   [id number]   Facility  [facility]   Outcome 4:17 pm Sent request for imaging to be pushed to PACS. -TAMMY Barth on 3/14/2024 at 10:48 AM Imaging resolved into PACS. -TAMMY     Records Requested     February 22, 2024 4:13 PM   [id number]   Facility  [facility]   Outcome 4:17 pm Sent request for imaging to be pushed to PACS. -TAMMY Barth on 3/14/2024 at 10:49 AM Imaging resolved into PACS. -TAMMY     RECORDS RECEIVED FROM: Care Everywhere   REASON FOR VISIT: Imbalance [R26.89]   PROVIDER: Jose Alfaro MD   DATE OF APPT: 4/3/24 @ 4:30 pm    NOTES (FOR ALL VISITS) STATUS DETAILS   OFFICE NOTE from referring provider Internal 1/4/24 Ray Hartley MD @[facility]-Ne[facility]     OFFICE NOTE from other specialist Care Everywhere 2/7/24 Luisana Garza MD  @[facility]    10/9/23 (Transferred Recs) [facility]     9/14/23 Margret Eduardo MD  [facility]    7/17/23 (Transcribed Orders) Chandrika Flores PA-C @[facility]    4/19/23 Wes Purcell MD  @[facility]    3/9/23 Sydney Fernandez MD  @[facility]    1/5/23 Arlin Smith MD  @[facility]     MEDICATION LIST Internal    IMAGING  (FOR ALL VISITS)     LUMBAR PUNCTURE PACS [facility]  11/21/23 Lumbar Puncture     MRI (HEAD, NECK, SPINE) Received [facility]  1/30/24 MR Cervical Spine  1/17/23 MR Brain     CT (HEAD, NECK, SPINE) PACS [facility]  1/3/23 CT Head Brain

## 2024-02-22 NOTE — TELEPHONE ENCOUNTER
[facility] Call Center    Phone Message    May a detailed message be left on voicemail: yes     Reason for Call: Order(s): Other: Emg   Reason for requested:Pt would like the order to be faxed to the [facility] at [phone]  Date needed: 02/22/2024  Provider name:  Ray Hartley MD    Action Taken: Message routed to:  [facility] Neurology    Travel Screening: Not Applicable

## 2024-03-04 ENCOUNTER — TRANSFERRED RECORDS (OUTPATIENT)
Dept: HEALTH INFORMATION MANAGEMENT | Facility: CLINIC | Age: 74
End: 2024-03-04
Payer: COMMERCIAL

## 2024-03-20 ENCOUNTER — TRANSFERRED RECORDS (OUTPATIENT)
Dept: HEALTH INFORMATION MANAGEMENT | Facility: CLINIC | Age: 74
End: 2024-03-20
Payer: COMMERCIAL

## 2024-04-01 NOTE — PROGRESS NOTES
[facility]    Neurology Consult    4/3/2024      Vidya Brink MRN# [id number]   YOB: 1950 Age: 73 year old      Primary care provider:   Wes Campos    Requesting provider:   Ray Hartley    Reason for Consult:  Dizziness    IMPRESSIONS:  Vidya Brink is a 73 year old female with a past medical history of James syndrome, ventriculomegaly, referred for a chronic sense of rocking vertigo as if she were on a boat that started after a fall. This is accompanied by a loss of balance. Extraocular movements are normal and there are no cerebellar signs. Though there is a family member with chronic cough, vestibulopathy, and peripheral neuropathy who would meet criteria for CANVAS, the patient herself does not exhibit any of these features. Incomplete penetrance is always a possibility.     The specific kind of vertigo experienced by the patient is typically vascular on the venous side. Moreover, she has ventriculomegaly and cerebral white matter disease which would both independently contribute to gait dysfunction. We discussed the multifactorial nature of gait dysfunction. We could start an evaluation for venous compression physiology in the neck and upper chest starting with an ultrasound. If abnormal, we can follow up with a CTVenogram head and neck. She has some mild pain over the left sternum and pectoralis minor tendon. Starting a physical therapy program based on these abnormalities might be helpful. She is happy to follow that algorithm.     Recommendations:  US TOS/internal jugular vein   CT venogram head and neck after ultrasound  Physical therapy--1st rib mobilization, anterior scalene stretches, ergonomic awareness  Follow-up after imaging    HISTORY OF PRESENT ILLNESS:  Vidya Brink is a 73 year old female with a past medical history of James syndrome, ventriculomegaly, referred for a chronic sense of rocking vertigo as if she were on a  "boat.    She had a fall on New Year's 2023 in which she spun around after getting up out of a chair and lost her balance. She fell directly on her face. Since 2023, she has felt significant sensation of imbalance. She had a lot of anxiety early on and she felt an intense boat rocking sensation. Over time, this has resolved and has been replaced by a more general sense of imbalance.     She notices it essentially all the time when she is walking, it is made worse when she has to walk quickly or turn/pivot quickly. She has good days and bad days. She denies double vision. She feels normal when sitting unless she makes very rapid head movements. She has not fallen since that New Year's 2023 fall. She denies any balance issues prior to the fall.    With rapid head movements, she endorses a sensation of persistent head movements. She describes it as \"like getting off a merry-go-round.\" She has done vestibular PT with some but incomplete benefit.    2023 ophthalmology appointment noted a \"wrinkle\" in her R retina. They recommended monitoring.     Her brother also has balance problems in addition to chronic cough, vestibular neuropathy, peripheral neuropathy. He is being evaluated for CANVAS. Her sister also has balance problems.     Had an MRI brain that showed \"enlarged ventricles.\" Had an LP done with Rich Velazquez -- OP 14.4cmH20, 28cc removed, closing pressure 6cmH20, gait assessment before and after; there was no change in her gait.    DIZZINESS:  Spinning vertigo: as above  Rocking vertigo: as above  Triggers: as above  Last VN23; noted to have \"central and non-localizing findings\"    AURAL SYMPTOMS:  Tinnitus: chronic ringing, 20+ years, not bothersome; L>R  Ear pressure: bilateral, R ear drained, L ear with ongoing but improved fullness sensation  Hearing loss: yes, L ear; getting hearing aid soon  Last audiogram: 2023    HEADACHE:  She endorses some tension behind the eyes sometimes. "   Aura: denies  Last brain imaging:   MRI brain without contrast 1-.  [facility].  1. Moderate ventriculomegaly particularly of the lateral and third ventricles and a mild degree of periventricular white matter hyperintense signal.  While there are no associated imaging findings to suggest a communicating hydrocephalus, and correlation with prior MRI from 2016, these findings are new and could represent normal pressure hydrocephalus with transependymal CSF flow.  Alternative consideration would include central cerebral volume loss.    2. Mild to moderate chronic small vessel ischemic disease.  No acute infarct, hemorrhage, mass or extra-axial fluid collection.    MRI C-spine without contrast [facility] 1-  1. Chronic foraminal stenosis moderate to severe bilateral C6-7, also moderate right C5-6, left C7-T1  2.  Disc protrusion/bulge with ventral cord contouring C3-4 to C6-7, mild to moderate central stenosis C5-6 and C6-7  3.  Moderate to severe disc degeneration C3-4 to C6-7    NECK/UPPER EXT SYMPTOMS:  Neck pain: No  Shoulder pain: No  Arm pain: No  Hand pain: No  Hand weakness: No  Numbness/tingling hands: Rarely  Color change hands: No  Swelling hands: No  Last EMG: 3/20/24 - subacute/chronic R L5 radiculopathy ([place])    BULBAR SYMPTOMS:  Dysphagia: No  Throat pressure: No  Chronic cough: No    CARDIAC:  Chest pain: No  Shortness of breath: No, finds herself taking shallow breaths; imbalance sensation is improved by deep breaths  Palpitation: No  Syncope: No  Last Echo: N/A    OTHER:  Pelvic pressure: No  Rectal pressure: No  Hematuria: No  Swelling in feet: No  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, tax planning), more sensitive to pressures of work    PAST MEDICAL HISTORY:  Past Medical History:   Diagnosis Date    Arthritis     Colon cancer (H) 1989, 1990    Fibrocystic breast     James syndrome     Pneumonia         PAST SURGICAL HISTORY:  Past Surgical History:    Procedure Laterality Date    ARTHROSCOPY SHOULDER ROTATOR CUFF REPAIR Right     COLON SURGERY      subtotal colectomy    HYSTERECTOMY      LAPAROSCOPIC HERNIORRHAPHY INGUINAL Left 09/17/2020    Procedure: LAPAROSCOPIC LEFT INGUINAL HERNIA REPAIR, POSSIBLE OPEN;  Surgeon: Donny Carrillo MD;  Location: [facility];  Service: General    PELVIC LAPAROSCOPY      endometriosis    SALPINGOOPHORECTOMY       SOCIAL HISTORY: , , focuses on taxes and estate planning, non-smoker    ALLERGIES:  Allergies   Allergen Reactions    Fosamax [Alendronate]      MEDICATIONS:    Current Outpatient Medications:     CALCIUM-VITAMIN D PO, Take by mouth daily, Disp: , Rfl:     cholecalciferol 25 MCG (1000 UT) TABS, Take 1,000 Units by mouth, Disp: , Rfl:     cyanocobalamin (VITAMIN B-12) 1000 MCG tablet, , Disp: , Rfl:     ketoconazole (NIZORAL) 2 % external cream, APPLY CREAM TOPICALLY TWICE DAILY TO FEET, Disp: , Rfl:     RISEdronate (ACTONEL) 5 MG tablet, Take 5 mg by mouth every morning (before breakfast), Disp: , Rfl:     cyclobenzaprine (FLEXERIL) 5 MG tablet, Take 1 tablet (5 mg) by mouth at bedtime as needed, may repeat once for muscle spasms (Patient not taking: Reported on 1/4/2024), Disp: 30 tablet, Rfl: 0    Multiple Vitamins-Minerals (HAIR SKIN AND NAILS FORMULA) TABS, , Disp: , Rfl:     RISEdronate (ACTONEL) 35 MG tablet, Take 35 mg by mouth, Disp: , Rfl:      PHYSICAL EXAM:  Vitals:  /79   Pulse 84   Resp 16   SpO2 100%     General: Patient is well-nourished, well-groomed, in no apparent distress    HEENT: Head is atraumatic, eyes look normal exteriorly, throat clear, neck supple, forward head posture  Ext: Warm, well-perfused. No edema. Good pulses.     Neurologic:  Mental Status: Alert and oriented to person, place, time, and situation.  Able to provide an excellent history.     Cranial Nerves: Visual fields full to confrontation. Extraocular movements full.  Face sensation normal.  Normal  head impulse testing.  Normal hearing to finger rub. Face symmetric with normal movements. Tongue and palate midline.  Normal shoulder elevation.      Motor: Normal bulk and tone.  No pronator drift.  Normal foot taps.  Full strength to confrontational testing.    Sensory: Normal light touch, temperature, and vibration.    Reflexes: Biceps, Brachioradialis, Triceps, Knees, Ankles 2/4.     Coordination: Normal finger to nose, rapid alternating movements    Gait: Normal stance width.  Negative Romberg.  Good gait initiation.  Good stride length.  Good arm swing.  Normal turn. Able to walk 5 steps in tandem.  Rights shoulder lower, right leg lower.     Upper Limb Tension Test for Thoracic Outlet Syndrome:  Arms abducted: Negative  Arms abducted head turned to the RIGHT: Negative  Arms abducted head turned to the LEFT: Negative  Arms abducted head tilt to the RIGHT: Negative  Arms abducted head tilt to the LEFT:    Pain Right scalene: No  Pain Left scalene: No  Pain Right sternocleidomastoid: No  Pain Left sternocleidomastoid: No    Pain under the RIGHT pectoralis minor tendon: No  Pain at the RIGHT 1st rib-sternum insertion site: No  Pain under the LEFT pectoralis minor tendon: Yes no radiation  Pain at the LEFT 1st rib-sternum insertion site: Yes no radiation     DATA:  All available and relevant labs, imaging, and other procedures were reviewed personally.   Last brain imaging:  XR Lumbar Spine 2/3 Views  Narrative: LUMBAR SPINE TWO TO THREE VIEWS  8/18/2023 12:05 PM     COMPARISON: None.    HISTORY: Pain.  Impression: IMPRESSION: Five lumbar type vertebrae. Grade 1 degenerative  anterolisthesis of L3 upon L4 and L4 upon L5. Alignment otherwise  normal. Vertebral body heights normal. No evidence for fracture. Facet  arthropathy throughout the lumbar spine.    DAVID LEON MD         SYSTEM ID:  [id number]  XR Sacroiliac Joint G/E 3 Views  Narrative: XR SACROILIAC JOINT G/E 3 VIEWS 8/18/2023 12:05 PM     HISTORY: Pain  of right sacroiliac joint    COMPARISON: None.   Impression: IMPRESSION: The SI joints are negative for sacroiliitis, ankylosis, or  diastases.    NELSY RIOS MD         SYSTEM ID:  [id number]  XR Hip Right 2-3 Views  Narrative: XR HIP RIGHT 2-3 VIEWS 8/18/2023 12:05 PM     HISTORY: Hip pain, right    COMPARISON: None.   Impression: IMPRESSION: Degenerative change of the right hip joint but no evidence  for fracture or dislocation. The right hemipelvis is negative.    ENLSY RIOS MD         SYSTEM ID:  [id number]    The longitudinal plan of care for the condition(s) below were addressed during this visit. Due to the added complexity in care, I will continue to support Vidya in the subsequent management of this condition(s) and with the ongoing continuity of care of this condition(s).    60-minutes were spent in evaluation, examination, and documentation.

## 2024-04-03 ENCOUNTER — OFFICE VISIT (OUTPATIENT)
Dept: NEUROLOGY | Facility: CLINIC | Age: 74
End: 2024-04-03
Attending: PSYCHIATRY & NEUROLOGY
Payer: COMMERCIAL

## 2024-04-03 ENCOUNTER — PRE VISIT (OUTPATIENT)
Dept: NEUROLOGY | Facility: CLINIC | Age: 74
End: 2024-04-03

## 2024-04-03 VITALS
HEART RATE: 84 BPM | OXYGEN SATURATION: 100 % | DIASTOLIC BLOOD PRESSURE: 79 MMHG | RESPIRATION RATE: 16 BRPM | SYSTOLIC BLOOD PRESSURE: 122 MMHG

## 2024-04-03 DIAGNOSIS — G24.3 CERVICAL DYSTONIA: ICD-10-CM

## 2024-04-03 DIAGNOSIS — R26.89 IMBALANCE: ICD-10-CM

## 2024-04-03 DIAGNOSIS — G54.0 TOS (THORACIC OUTLET SYNDROME): ICD-10-CM

## 2024-04-03 DIAGNOSIS — R42 VERTIGO: Primary | ICD-10-CM

## 2024-04-03 DIAGNOSIS — I87.1 COMPRESSION OF VEIN: ICD-10-CM

## 2024-04-03 PROCEDURE — 99417 PROLNG OP E/M EACH 15 MIN: CPT | Performed by: PSYCHIATRY & NEUROLOGY

## 2024-04-03 PROCEDURE — G2211 COMPLEX E/M VISIT ADD ON: HCPCS | Performed by: PSYCHIATRY & NEUROLOGY

## 2024-04-03 PROCEDURE — 99215 OFFICE O/P EST HI 40 MIN: CPT | Performed by: PSYCHIATRY & NEUROLOGY

## 2024-04-03 RX ORDER — LANOLIN ALCOHOL/MO/W.PET/CERES
CREAM (GRAM) TOPICAL
COMMUNITY

## 2024-04-03 ASSESSMENT — PAIN SCALES - GENERAL: PAINLEVEL: NO PAIN (0)

## 2024-04-03 NOTE — LETTER
4/3/2024       RE: Vidya Brink  [address]  [facility] [address]     Dear Colleague,    Thank you for referring your patient, Vidya Brink, to the [facility]. Please see a copy of my visit note below.    [facility]    Neurology Consult    4/3/2024      Vidya Brink MRN# [id number]   YOB: 1950 Age: 73 year old      Primary care provider:   Wes Campos    Requesting provider:   Ray Hartley    Reason for Consult:  Dizziness    IMPRESSIONS:  Vidya Brink is a 73 year old female with a past medical history of James syndrome, ventriculomegaly, referred for a chronic sense of rocking vertigo as if she were on a boat that started after a fall. This is accompanied by a loss of balance. Extraocular movements are normal and there are no cerebellar signs. Though there is a family member with chronic cough, vestibulopathy, and peripheral neuropathy who would meet criteria for CANVAS, the patient herself does not exhibit any of these features. Incomplete penetrance is always a possibility.     The specific kind of vertigo experienced by the patient is typically vascular on the venous side. Moreover, she has ventriculomegaly and cerebral white matter disease which would both independently contribute to gait dysfunction. We discussed the multifactorial nature of gait dysfunction. We could start an evaluation for venous compression physiology in the neck and upper chest starting with an ultrasound. If abnormal, we can follow up with a CTVenogram head and neck. She has some mild pain over the left sternum and pectoralis minor tendon. Starting a physical therapy program based on these abnormalities might be helpful. She is happy to follow that algorithm.     Recommendations:  US TOS/internal jugular vein   CT venogram head and neck after  "ultrasound  Physical therapy--1st rib mobilization, anterior scalene stretches, ergonomic awareness  Follow-up after imaging    HISTORY OF PRESENT ILLNESS:  Vidya Brink is a 73 year old female with a past medical history of James syndrome, ventriculomegaly, referred for a chronic sense of rocking vertigo as if she were on a boat.    She had a fall on New Year's 2023 in which she spun around after getting up out of a chair and lost her balance. She fell directly on her face. Since 2023, she has felt significant sensation of imbalance. She had a lot of anxiety early on and she felt an intense boat rocking sensation. Over time, this has resolved and has been replaced by a more general sense of imbalance.     She notices it essentially all the time when she is walking, it is made worse when she has to walk quickly or turn/pivot quickly. She has good days and bad days. She denies double vision. She feels normal when sitting unless she makes very rapid head movements. She has not fallen since that New Year's 2023 fall. She denies any balance issues prior to the fall.    With rapid head movements, she endorses a sensation of persistent head movements. She describes it as \"like getting off a merry-go-round.\" She has done vestibular PT with some but incomplete benefit.    2023 ophthalmology appointment noted a \"wrinkle\" in her R retina. They recommended monitoring.     Her brother also has balance problems in addition to chronic cough, vestibular neuropathy, peripheral neuropathy. He is being evaluated for CANVAS. Her sister also has balance problems.     Had an MRI brain that showed \"enlarged ventricles.\" Had an LP done with Rich Velazquez -- OP 14.4cmH20, 28cc removed, closing pressure 6cmH20, gait assessment before and after; there was no change in her gait.    DIZZINESS:  Spinning vertigo: as above  Rocking vertigo: as above  Triggers: as above  Last VN23; noted to have \"central and " "non-localizing findings\"    AURAL SYMPTOMS:  Tinnitus: chronic ringing, 20+ years, not bothersome; L>R  Ear pressure: bilateral, R ear drained, L ear with ongoing but improved fullness sensation  Hearing loss: yes, L ear; getting hearing aid soon  Last audiogram: 5/19/2023    HEADACHE:  She endorses some tension behind the eyes sometimes.   Aura: denies  Last brain imaging:   MRI brain without contrast 1-.  [facility].  1. Moderate ventriculomegaly particularly of the lateral and third ventricles and a mild degree of periventricular white matter hyperintense signal.  While there are no associated imaging findings to suggest a communicating hydrocephalus, and correlation with prior MRI from 2016, these findings are new and could represent normal pressure hydrocephalus with transependymal CSF flow.  Alternative consideration would include central cerebral volume loss.    2. Mild to moderate chronic small vessel ischemic disease.  No acute infarct, hemorrhage, mass or extra-axial fluid collection.    MRI C-spine without contrast [facility] 1-  1. Chronic foraminal stenosis moderate to severe bilateral C6-7, also moderate right C5-6, left C7-T1  2.  Disc protrusion/bulge with ventral cord contouring C3-4 to C6-7, mild to moderate central stenosis C5-6 and C6-7  3.  Moderate to severe disc degeneration C3-4 to C6-7    NECK/UPPER EXT SYMPTOMS:  Neck pain: No  Shoulder pain: No  Arm pain: No  Hand pain: No  Hand weakness: No  Numbness/tingling hands: Rarely  Color change hands: No  Swelling hands: No  Last EMG: 3/20/24 - subacute/chronic R L5 radiculopathy ([place])    BULBAR SYMPTOMS:  Dysphagia: No  Throat pressure: No  Chronic cough: No    CARDIAC:  Chest pain: No  Shortness of breath: No, finds herself taking shallow breaths; imbalance sensation is improved by deep breaths  Palpitation: No  Syncope: No  Last Echo: N/A    OTHER:  Pelvic pressure: No  Rectal pressure: No  Hematuria: No  Swelling in " feet: No  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, tax planning), more sensitive to pressures of work    PAST MEDICAL HISTORY:  Past Medical History:   Diagnosis Date    Arthritis     Colon cancer (H) 1989, 1990    Fibrocystic breast     James syndrome     Pneumonia         PAST SURGICAL HISTORY:  Past Surgical History:   Procedure Laterality Date    ARTHROSCOPY SHOULDER ROTATOR CUFF REPAIR Right     COLON SURGERY      subtotal colectomy    HYSTERECTOMY      LAPAROSCOPIC HERNIORRHAPHY INGUINAL Left 09/17/2020    Procedure: LAPAROSCOPIC LEFT INGUINAL HERNIA REPAIR, POSSIBLE OPEN;  Surgeon: Donny Carrillo MD;  Location: [facility];  Service: General    PELVIC LAPAROSCOPY      endometriosis    SALPINGOOPHORECTOMY       SOCIAL HISTORY: , , focuses on taxes and estate planning, non-smoker    ALLERGIES:  Allergies   Allergen Reactions    Fosamax [Alendronate]      MEDICATIONS:    Current Outpatient Medications:     CALCIUM-VITAMIN D PO, Take by mouth daily, Disp: , Rfl:     cholecalciferol 25 MCG (1000 UT) TABS, Take 1,000 Units by mouth, Disp: , Rfl:     cyanocobalamin (VITAMIN B-12) 1000 MCG tablet, , Disp: , Rfl:     ketoconazole (NIZORAL) 2 % external cream, APPLY CREAM TOPICALLY TWICE DAILY TO FEET, Disp: , Rfl:     RISEdronate (ACTONEL) 5 MG tablet, Take 5 mg by mouth every morning (before breakfast), Disp: , Rfl:     cyclobenzaprine (FLEXERIL) 5 MG tablet, Take 1 tablet (5 mg) by mouth at bedtime as needed, may repeat once for muscle spasms (Patient not taking: Reported on 1/4/2024), Disp: 30 tablet, Rfl: 0    Multiple Vitamins-Minerals (HAIR SKIN AND NAILS FORMULA) TABS, , Disp: , Rfl:     RISEdronate (ACTONEL) 35 MG tablet, Take 35 mg by mouth, Disp: , Rfl:      PHYSICAL EXAM:  Vitals:  /79   Pulse 84   Resp 16   SpO2 100%     General: Patient is well-nourished, well-groomed, in no apparent distress    HEENT: Head is atraumatic, eyes look normal exteriorly, throat  clear, neck supple, forward head posture  Ext: Warm, well-perfused. No edema. Good pulses.     Neurologic:  Mental Status: Alert and oriented to person, place, time, and situation.  Able to provide an excellent history.     Cranial Nerves: Visual fields full to confrontation. Extraocular movements full.  Face sensation normal.  Normal head impulse testing.  Normal hearing to finger rub. Face symmetric with normal movements. Tongue and palate midline.  Normal shoulder elevation.      Motor: Normal bulk and tone.  No pronator drift.  Normal foot taps.  Full strength to confrontational testing.    Sensory: Normal light touch, temperature, and vibration.    Reflexes: Biceps, Brachioradialis, Triceps, Knees, Ankles 2/4.     Coordination: Normal finger to nose, rapid alternating movements    Gait: Normal stance width.  Negative Romberg.  Good gait initiation.  Good stride length.  Good arm swing.  Normal turn. Able to walk 5 steps in tandem.  Rights shoulder lower, right leg lower.     Upper Limb Tension Test for Thoracic Outlet Syndrome:  Arms abducted: Negative  Arms abducted head turned to the RIGHT: Negative  Arms abducted head turned to the LEFT: Negative  Arms abducted head tilt to the RIGHT: Negative  Arms abducted head tilt to the LEFT:    Pain Right scalene: No  Pain Left scalene: No  Pain Right sternocleidomastoid: No  Pain Left sternocleidomastoid: No    Pain under the RIGHT pectoralis minor tendon: No  Pain at the RIGHT 1st rib-sternum insertion site: No  Pain under the LEFT pectoralis minor tendon: Yes no radiation  Pain at the LEFT 1st rib-sternum insertion site: Yes no radiation     DATA:  All available and relevant labs, imaging, and other procedures were reviewed personally.   Last brain imaging:  XR Lumbar Spine 2/3 Views  Narrative: LUMBAR SPINE TWO TO THREE VIEWS  8/18/2023 12:05 PM     COMPARISON: None.    HISTORY: Pain.  Impression: IMPRESSION: Five lumbar type vertebrae. Grade 1  degenerative  anterolisthesis of L3 upon L4 and L4 upon L5. Alignment otherwise  normal. Vertebral body heights normal. No evidence for fracture. Facet  arthropathy throughout the lumbar spine.    DAVID LEON MD         SYSTEM ID:  [id number]  XR Sacroiliac Joint G/E 3 Views  Narrative: XR SACROILIAC JOINT G/E 3 VIEWS 8/18/2023 12:05 PM     HISTORY: Pain of right sacroiliac joint    COMPARISON: None.   Impression: IMPRESSION: The SI joints are negative for sacroiliitis, ankylosis, or  diastases.    NELSY RIOS MD         SYSTEM ID:  [id number]  XR Hip Right 2-3 Views  Narrative: XR HIP RIGHT 2-3 VIEWS 8/18/2023 12:05 PM     HISTORY: Hip pain, right    COMPARISON: None.   Impression: IMPRESSION: Degenerative change of the right hip joint but no evidence  for fracture or dislocation. The right hemipelvis is negative.    NELSY RIOS MD         SYSTEM ID:  [id number]    The longitudinal plan of care for the condition(s) below were addressed during this visit. Due to the added complexity in care, I will continue to support Vidya in the subsequent management of this condition(s) and with the ongoing continuity of care of this condition(s).    60-minutes were spent in evaluation, examination, and documentation.      Again, thank you for allowing me to participate in the care of your patient.      Sincerely,    Jose MARKHAM Cha, MD

## 2024-04-10 ENCOUNTER — TELEPHONE (OUTPATIENT)
Dept: NEUROLOGY | Facility: CLINIC | Age: 74
End: 2024-04-10
Payer: COMMERCIAL

## 2024-04-10 NOTE — TELEPHONE ENCOUNTER
Left Voicemail (1st Attempt) and Sent Mychart (1st Attempt) for the patient to call back and schedule the following:    Appointment type: 6 month follow   Provider: Dr. Alfaro  Return date: Oct 2024  Specialty phone number: [phone]  Additional appointment(s) needed:   Additonal Notes:

## 2024-04-12 NOTE — TELEPHONE ENCOUNTER
Left Voicemail (2nd Attempt) for the patient to call back and schedule the following:    Appointment type: Return Neurology -Virtual   Provider: Angelina  Return date: around 10/2025  Specialty phone number: [phone]  Additional appointment(s) needed: n/a  Additonal Notes: WQ-appointment request         Donna Scott on 4/12/2024 at 2:01 PM

## 2024-04-17 ENCOUNTER — ANCILLARY PROCEDURE (OUTPATIENT)
Dept: ULTRASOUND IMAGING | Facility: CLINIC | Age: 74
End: 2024-04-17
Attending: PSYCHIATRY & NEUROLOGY
Payer: COMMERCIAL

## 2024-04-17 DIAGNOSIS — G54.0 TOS (THORACIC OUTLET SYNDROME): ICD-10-CM

## 2024-04-17 DIAGNOSIS — R26.89 IMBALANCE: ICD-10-CM

## 2024-04-17 PROCEDURE — 93922 UPR/L XTREMITY ART 2 LEVELS: CPT | Performed by: RADIOLOGY

## 2024-04-17 PROCEDURE — 93970 EXTREMITY STUDY: CPT | Performed by: RADIOLOGY

## 2024-04-29 DIAGNOSIS — M54.2 NECK PAIN: ICD-10-CM

## 2024-04-29 DIAGNOSIS — I87.1 COMPRESSION OF VEIN: Primary | ICD-10-CM

## 2024-04-29 DIAGNOSIS — R26.89 IMBALANCE: ICD-10-CM

## 2024-04-29 DIAGNOSIS — R42 VERTIGO: ICD-10-CM

## 2024-05-07 ENCOUNTER — ANCILLARY PROCEDURE (OUTPATIENT)
Dept: CT IMAGING | Facility: CLINIC | Age: 74
End: 2024-05-07
Attending: PSYCHIATRY & NEUROLOGY
Payer: COMMERCIAL

## 2024-05-07 PROCEDURE — 70498 CT ANGIOGRAPHY NECK: CPT | Performed by: RADIOLOGY

## 2024-05-07 PROCEDURE — 70496 CT ANGIOGRAPHY HEAD: CPT | Performed by: RADIOLOGY

## 2024-05-07 RX ORDER — IOPAMIDOL 755 MG/ML
70 INJECTION, SOLUTION INTRAVASCULAR ONCE
Status: COMPLETED | OUTPATIENT
Start: 2024-05-07 | End: 2024-05-07

## 2024-05-07 RX ADMIN — IOPAMIDOL 70 ML: 755 INJECTION, SOLUTION INTRAVASCULAR at 16:36

## 2024-05-07 NOTE — DISCHARGE INSTRUCTIONS

## 2024-05-27 DIAGNOSIS — I87.1 COMPRESSION OF VEIN: Primary | ICD-10-CM

## 2024-05-27 DIAGNOSIS — G24.3 CERVICAL DYSTONIA: ICD-10-CM

## 2024-05-27 DIAGNOSIS — G54.0 TOS (THORACIC OUTLET SYNDROME): ICD-10-CM

## 2024-05-27 DIAGNOSIS — M54.2 NECK PAIN: ICD-10-CM

## 2024-05-27 DIAGNOSIS — R26.89 IMBALANCE: ICD-10-CM

## 2024-05-27 NOTE — PROGRESS NOTES
The patient is being evaluated via a billable video visit.    How would you like to obtain your AVS? MyChart  If the video visit is dropped, the invitation should be resent by: Send to e-mail at: [email or web address]  Will anyone else be joining your video visit? No      Video-Visit Details  Type of service:  Video Visit  Video Start Time:2:32 PM  Video End Time:3:25 PM  Originating Location (pt. Location): Home  Distant Location (provider location):  [facility]UROLOG[facility]   Platform used for Video Visit: [employer]    IMPRESSIONS:  Vidya Brink is a 73 year old female with a past medical history of James syndrome, ventriculomegaly, referred for a chronic sense of rocking vertigo as if she were on a boat that started after a fall. This is accompanied by a loss of balance. Extraocular movements are normal and there are no cerebellar signs. Though there is a family member with chronic cough, vestibulopathy, and peripheral neuropathy who would meet criteria for CANVAS, the patient herself does not exhibit any of these features. Incomplete penetrance is always a possibility.      The specific kind of vertigo experienced by the patient is typically vascular on the venous side. Moreover, she has ventriculomegaly and cerebral white matter disease which would both independently contribute to gait dysfunction. We discussed the multifactorial nature of gait dysfunction. We could start an evaluation for venous compression physiology in the neck and upper chest starting with an ultrasound. If abnormal, we can follow up with a CTVenogram head and neck. She has some mild pain over the left sternum and pectoralis minor tendon. Starting a physical therapy program based on these abnormalities might be helpful. She is happy to follow that algorithm.      Recommendations:  US TOS/internal jugular vein   CT venogram head and neck after ultrasound  Physical therapy--1st rib mobilization, anterior scalene  stretches, ergonomic awareness  Follow-up after imaging    Interim History 5/31/2024:  Cognition: Right now, she feels that she takes longer to make decisions. But overall, she is doing well. She doesn't get lost. She can plan a day and follow-through. She schedules and meets her appointments. She is driving, not having accidents. She can be some car sickness. The rocking on a boat feeling is gone.   No urinary incontinence.   Main problem is that she can't move fast because of a feeling of wooziness. She mostly has head motion sensitivity. Sometimes her feet can feel a little incoordinated. Feet are not stuck on the floor.      We discussed the potential evaluation of work-up for Normal Pressure Hydrocephalus but she does not have the classic features of this. She walks 1 hour a day. She does not sleep well because of the need to go to the bathroom. She can sleep about 3 hours a day. She goes to bed to at midnight.     PLAN:  PT gait imbalance and posture and posterior neck strengthening  Try 0.5-1mg melatonin in the middle of the night.   Watchful waiting and symptom assessment for NPH--would refer to the NPH program in neurosurgery  Follow-up as needed    DATA:  I personally reviewed the following data.    Last brain imaging:  CTV Head Neck w Contrast  Narrative: EXAM: CTV HEAD NECK W CONTRAST, 5/7/2024 4:56 PM    HISTORY:  73F with rocking vertigo, gait dysfunction, question venous  compression, styloid length.; Compression of vein; Vertigo; Imbalance;  Neck pain.    COMPARISON: Vascular ultrasound 4/17/2024. MRI cervical spine  1/30/2024.  Brain MRI from an outside institution 1/17/2023.    TECHNIQUE: Helically acquired thin section axial CT images were  obtained with 1 mm collimation through the head and neck after  intravenous bolus injection of iodinated contrast medium with a delay  between administration of contrast and scanning. Imaging performed in  the neutral position and with neck flexion. Image data  were sent to  the 3D workstation and postprocessed by the technologist and  radiologist  using maximum intensity pixel (MIP), multiplanar and  volume rendered 3D reconstruction programs. I personally participated  in the 3D reconstruction process and interpretation of the final,  archived 3D images on an independent workstation.    CONTRAST: ISOVUE 370 70cc.    FINDINGS:  No thrombosis or stenosis of the major intracranial dural  sinuses or deep cerebral veins.     Ventricles are enlarged relative to the cerebral sulci, similar to the  examination on 1/17/2023. Incidental developmental venous anomaly in  the the lateral right frontal lobe.    The right styloid process measures 31 mm. The left styloid process  measures 28 mm.    RIGHT:  In the neutral position, there is no significant narrowing of the  right internal jugular vein. With neck flexion, the right internal  jugular vein remains patent. No paravertebral venous dilatation.    LEFT:  In the neutral position, there is no significant narrowing of the  right internal jugular vein. With neck flexion, there is mild  narrowing of the upper left internal jugular vein as it courses  between the styloid process and left C1 transverse process. The left  internal jugular vein otherwise remains patent. No paravertebral  venous dilatation.    Clear paranasal sinuses and mastoid air cells. The imaged skull base,  intracranial and orbital structures are within normal limits.  Subcentimeter bilateral thyroid nodules requiring no further follow-up  by imaging criteria. No suspicious finding in the visualized superior  mediastinum/thorax.     Clear lung apices. Mild biapical pulmonary scarring.  Impression: IMPRESSION:   1. No narrowing of the internal jugular veins in the neutral position.  Mild narrowing of the upper left internal jugular vein with neck  flexion. This is of doubtful clinical significance and can be seen in  the absence of symptoms.  2. Patent dural venous  sinuses and major deep intracranial veins.  3. Ventriculomegaly suggestive of normal pressure hydrocephalus,  unchanged since 2023.    DAVID GUILLERMO MD         SYSTEM ID:  [id number]        ULTRASOUND UPPER EXTREMITY VEIN AND PPG THORACIC OUTLET SYNDROME  BILATERAL 4/17/2024   FINDINGS:  RIGHT:       REST:            INTERNAL JUGULAR VEIN: 47 cm/s, phasic, fully compressible            INNOMINATE VEIN: 32 cm/s, phasic            SUBCLAVIAN VEIN, medial: 55 cm/s, phasic            SUBCLAVIAN VEIN, mid: 61 cm/s, phasic, fully compressible            SUBCLAVIAN VEIN, lateral: 22 cm/s, phasic, fully  compressible            AXILLARY VEIN: 27 cm/s, phasic, fully compressible          MID SUBCLAVIAN VEIN, sitting erect:            0 degrees: 29 cm/s, phasic            90 degrees: 89 cm/s, phasic            135 degrees: 67 cm/s, phasic            180 degrees: 68 cm/s, phasic          INTERNAL JUGULAR VEIN, sitting erect:            Neutral: 33 cm/s, phasic            Right: 83 cm/s, phasic            Left: 65 cm/s, phasic            Extension: 179 cm/s, phasic            Flexion: 171 cm/s, phasic          PPGs: Diminished at baseline compared to the left.            Baseline: Normal            Arms 90: Normal            Arms 180: ABNORMAL - diminished - nearly occluded              : ABNORMAL - diminished - nearly occluded             head right: ABNORMAL - diminished - nearly occluded             head left: ABNORMAL - diminished - nearly occluded     LEFT:       REST:            INTERNAL JUGULAR VEIN: 29 cm/s, phasic, fully compressible            INNOMINATE VEIN: 65 cm/s, phasic, 88 cm/s            SUBCLAVIAN VEIN, medial: 54 cm/s, phasic            SUBCLAVIAN VEIN, mid: 46 cm/s, phasic, fully compressible            SUBCLAVIAN VEIN, lateral: 33 cm/s, phasic, fully  compressible            AXILLARY VEIN: 32 cm/s, phasic, fully compressible          MID SUBCLAVIAN VEIN, sitting erect:            0  degrees: 13 cm/s, phasic            90 degrees: 23 cm/s, phasic            135 degrees: 0 cm/s, occluded            180 degrees: 29 cm/s, phasic          INTERNAL JUGULAR VEIN, sitting erect:            Neutral: 51 cm/s, phasic            Right: 119 cm/s, phasic            Left: 110 cm/s, phasic            Extension: 140 cm/s, phasic            Flexion: 55 cm/s, phasic         PPGs:            Baseline: Normal            Arms 90: Normal            Arms 180: ABNORMAL - diminished - nearly occluded            : ABNORMAL - diminished             head right: ABNORMAL - diminished             head left: ABNORMAL - diminished                                                                   IMPRESSION: Thoracic outlet/inlet physiology suggested. Correlate for  symptoms.     1. RIGHT:       A. No subclavian venous stenosis suggested at rest.       B. No subclavian venous stenosis suggested with maneuvers.       C. Internal jugular vein narrowing suggested in extension and  flexion. No occlusion demonstrated.       D. PPG diminished compared to the left at rest. PPG nearly  occluded in Arms 180 and  positions.     2. LEFT:       A. No subclavian venous stenosis suggested at rest.       B. Subclavian vein occludes in 135 degrees.       C. No internal jugular venous stenosis suggested with maneuvers.       D. PPG nearly occluded in Arms 180. Diminished in   positions.     CLARIBEL PEREZ MD     54-minutes were spent in evaluation, counseling, and documentation on the date of service.

## 2024-05-30 ENCOUNTER — HOSPITAL ENCOUNTER (OUTPATIENT)
Dept: BONE DENSITY | Facility: HOSPITAL | Age: 74
Discharge: HOME | End: 2024-05-30
Attending: FAMILY MEDICINE | Admitting: FAMILY MEDICINE
Payer: COMMERCIAL

## 2024-05-30 DIAGNOSIS — Z78.0 MENOPAUSE: ICD-10-CM

## 2024-05-30 DIAGNOSIS — M81.0 AGE-RELATED OSTEOPOROSIS WITHOUT CURRENT PATHOLOGICAL FRACTURE: ICD-10-CM

## 2024-05-30 PROCEDURE — 77089 TBS DXA CAL W/I&R FX RISK: CPT

## 2024-05-31 ENCOUNTER — VIRTUAL VISIT (OUTPATIENT)
Dept: NEUROLOGY | Facility: CLINIC | Age: 74
End: 2024-05-31
Payer: COMMERCIAL

## 2024-05-31 DIAGNOSIS — R26.89 IMBALANCE: Primary | ICD-10-CM

## 2024-05-31 DIAGNOSIS — G93.89 CEREBRAL VENTRICULOMEGALY: ICD-10-CM

## 2024-05-31 PROCEDURE — 99215 OFFICE O/P EST HI 40 MIN: CPT | Mod: 95 | Performed by: PSYCHIATRY & NEUROLOGY

## 2024-05-31 ASSESSMENT — PAIN SCALES - GENERAL: PAINLEVEL: NO PAIN (0)

## 2024-05-31 NOTE — LETTER
5/31/2024       RE: Vidya Brink  [address]  [facility] [address]       Dear Colleague,    Thank you for referring your patient, Vidya Brink, to the [facility]UR[facility]. Please see a copy of my visit note below.    The patient is being evaluated via a billable video visit.    How would you like to obtain your AVS? MyChart  If the video visit is dropped, the invitation should be resent by: Send to e-mail at: [email or web address]  Will anyone else be joining your video visit? No        IMPRESSIONS:  Vidya Brink is a 73 year old female with a past medical history of James syndrome, ventriculomegaly, referred for a chronic sense of rocking vertigo as if she were on a boat that started after a fall. This is accompanied by a loss of balance. Extraocular movements are normal and there are no cerebellar signs. Though there is a family member with chronic cough, vestibulopathy, and peripheral neuropathy who would meet criteria for CANVAS, the patient herself does not exhibit any of these features. Incomplete penetrance is always a possibility.      The specific kind of vertigo experienced by the patient is typically vascular on the venous side. Moreover, she has ventriculomegaly and cerebral white matter disease which would both independently contribute to gait dysfunction. We discussed the multifactorial nature of gait dysfunction. We could start an evaluation for venous compression physiology in the neck and upper chest starting with an ultrasound. If abnormal, we can follow up with a CTVenogram head and neck. She has some mild pain over the left sternum and pectoralis minor tendon. Starting a physical therapy program based on these abnormalities might be helpful. She is happy to follow that algorithm.      Recommendations:  US TOS/internal jugular vein   CT venogram head and neck after  ultrasound  Physical therapy--1st rib mobilization, anterior scalene stretches, ergonomic awareness  Follow-up after imaging    Interim History 5/31/2024:  Cognition: Right now, she feels that she takes longer to make decisions. But overall, she is doing well. She doesn't get lost. She can plan a day and follow-through. She schedules and meets her appointments. She is driving, not having accidents. She can be some car sickness. The rocking on a boat feeling is gone.   No urinary incontinence.   Main problem is that she can't move fast because of a feeling of wooziness. She mostly has head motion sensitivity. Sometimes her feet can feel a little incoordinated. Feet are not stuck on the floor.      We discussed the potential evaluation of work-up for Normal Pressure Hydrocephalus but she does not have the classic features of this. She walks 1 hour a day. She does not sleep well because of the need to go to the bathroom. She can sleep about 3 hours a day. She goes to bed to at midnight.     PLAN:  PT gait imbalance and posture and posterior neck strengthening  Try 0.5-1mg melatonin in the middle of the night.   Watchful waiting and symptom assessment for NPH--would refer to the NPH program in neurosurgery  Follow-up as needed    DATA:  I personally reviewed the following data.    Last brain imaging:  CTV Head Neck w Contrast  Narrative: EXAM: CTV HEAD NECK W CONTRAST, 5/7/2024 4:56 PM    HISTORY:  73F with rocking vertigo, gait dysfunction, question venous  compression, styloid length.; Compression of vein; Vertigo; Imbalance;  Neck pain.    COMPARISON: Vascular ultrasound 4/17/2024. MRI cervical spine  1/30/2024.  Brain MRI from an outside institution 1/17/2023.    TECHNIQUE: Helically acquired thin section axial CT images were  obtained with 1 mm collimation through the head and neck after  intravenous bolus injection of iodinated contrast medium with a delay  between administration of contrast and scanning. Imaging  performed in  the neutral position and with neck flexion. Image data were sent to  the 3D workstation and postprocessed by the technologist and  radiologist  using maximum intensity pixel (MIP), multiplanar and  volume rendered 3D reconstruction programs. I personally participated  in the 3D reconstruction process and interpretation of the final,  archived 3D images on an independent workstation.    CONTRAST: ISOVUE 370 70cc.    FINDINGS:  No thrombosis or stenosis of the major intracranial dural  sinuses or deep cerebral veins.     Ventricles are enlarged relative to the cerebral sulci, similar to the  examination on 1/17/2023. Incidental developmental venous anomaly in  the the lateral right frontal lobe.    The right styloid process measures 31 mm. The left styloid process  measures 28 mm.    RIGHT:  In the neutral position, there is no significant narrowing of the  right internal jugular vein. With neck flexion, the right internal  jugular vein remains patent. No paravertebral venous dilatation.    LEFT:  In the neutral position, there is no significant narrowing of the  right internal jugular vein. With neck flexion, there is mild  narrowing of the upper left internal jugular vein as it courses  between the styloid process and left C1 transverse process. The left  internal jugular vein otherwise remains patent. No paravertebral  venous dilatation.    Clear paranasal sinuses and mastoid air cells. The imaged skull base,  intracranial and orbital structures are within normal limits.  Subcentimeter bilateral thyroid nodules requiring no further follow-up  by imaging criteria. No suspicious finding in the visualized superior  mediastinum/thorax.     Clear lung apices. Mild biapical pulmonary scarring.  Impression: IMPRESSION:   1. No narrowing of the internal jugular veins in the neutral position.  Mild narrowing of the upper left internal jugular vein with neck  flexion. This is of doubtful clinical significance  and can be seen in  the absence of symptoms.  2. Patent dural venous sinuses and major deep intracranial veins.  3. Ventriculomegaly suggestive of normal pressure hydrocephalus,  unchanged since 2023.    DAVID GUILLERMO MD         SYSTEM ID:  [id number]        ULTRASOUND UPPER EXTREMITY VEIN AND PPG THORACIC OUTLET SYNDROME  BILATERAL 4/17/2024   FINDINGS:  RIGHT:       REST:            INTERNAL JUGULAR VEIN: 47 cm/s, phasic, fully compressible            INNOMINATE VEIN: 32 cm/s, phasic            SUBCLAVIAN VEIN, medial: 55 cm/s, phasic            SUBCLAVIAN VEIN, mid: 61 cm/s, phasic, fully compressible            SUBCLAVIAN VEIN, lateral: 22 cm/s, phasic, fully  compressible            AXILLARY VEIN: 27 cm/s, phasic, fully compressible          MID SUBCLAVIAN VEIN, sitting erect:            0 degrees: 29 cm/s, phasic            90 degrees: 89 cm/s, phasic            135 degrees: 67 cm/s, phasic            180 degrees: 68 cm/s, phasic          INTERNAL JUGULAR VEIN, sitting erect:            Neutral: 33 cm/s, phasic            Right: 83 cm/s, phasic            Left: 65 cm/s, phasic            Extension: 179 cm/s, phasic            Flexion: 171 cm/s, phasic          PPGs: Diminished at baseline compared to the left.            Baseline: Normal            Arms 90: Normal            Arms 180: ABNORMAL - diminished - nearly occluded              : ABNORMAL - diminished - nearly occluded             head right: ABNORMAL - diminished - nearly occluded             head left: ABNORMAL - diminished - nearly occluded     LEFT:       REST:            INTERNAL JUGULAR VEIN: 29 cm/s, phasic, fully compressible            INNOMINATE VEIN: 65 cm/s, phasic, 88 cm/s            SUBCLAVIAN VEIN, medial: 54 cm/s, phasic            SUBCLAVIAN VEIN, mid: 46 cm/s, phasic, fully compressible            SUBCLAVIAN VEIN, lateral: 33 cm/s, phasic, fully  compressible            AXILLARY VEIN: 32 cm/s, phasic, fully  compressible          MID SUBCLAVIAN VEIN, sitting erect:            0 degrees: 13 cm/s, phasic            90 degrees: 23 cm/s, phasic            135 degrees: 0 cm/s, occluded            180 degrees: 29 cm/s, phasic          INTERNAL JUGULAR VEIN, sitting erect:            Neutral: 51 cm/s, phasic            Right: 119 cm/s, phasic            Left: 110 cm/s, phasic            Extension: 140 cm/s, phasic            Flexion: 55 cm/s, phasic         PPGs:            Baseline: Normal            Arms 90: Normal            Arms 180: ABNORMAL - diminished - nearly occluded            : ABNORMAL - diminished             head right: ABNORMAL - diminished             head left: ABNORMAL - diminished                                                                   IMPRESSION: Thoracic outlet/inlet physiology suggested. Correlate for  symptoms.     1. RIGHT:       A. No subclavian venous stenosis suggested at rest.       B. No subclavian venous stenosis suggested with maneuvers.       C. Internal jugular vein narrowing suggested in extension and  flexion. No occlusion demonstrated.       D. PPG diminished compared to the left at rest. PPG nearly  occluded in Arms 180 and  positions.     2. LEFT:       A. No subclavian venous stenosis suggested at rest.       B. Subclavian vein occludes in 135 degrees.       C. No internal jugular venous stenosis suggested with maneuvers.       D. PPG nearly occluded in Arms 180. Diminished in   positions.     CLARIBEL PEREZ MD     54-minutes were spent in evaluation, counseling, and documentation on the date of service.      Again, thank you for allowing me to participate in the care of your patient.      Sincerely,    Jose MARKHAM Cha, MD

## 2024-06-20 ENCOUNTER — OFFICE VISIT (OUTPATIENT)
Dept: NEUROLOGY | Facility: CLINIC | Age: 74
End: 2024-06-20
Payer: COMMERCIAL

## 2024-06-20 ENCOUNTER — LAB (OUTPATIENT)
Dept: LAB | Facility: CLINIC | Age: 74
End: 2024-06-20
Payer: COMMERCIAL

## 2024-06-20 VITALS
RESPIRATION RATE: 16 BRPM | SYSTOLIC BLOOD PRESSURE: 121 MMHG | DIASTOLIC BLOOD PRESSURE: 75 MMHG | HEART RATE: 69 BPM | WEIGHT: 160.1 LBS | BODY MASS INDEX: 24.34 KG/M2 | OXYGEN SATURATION: 96 %

## 2024-06-20 DIAGNOSIS — R26.89 IMBALANCE: Primary | ICD-10-CM

## 2024-06-20 DIAGNOSIS — R26.89 IMBALANCE: ICD-10-CM

## 2024-06-20 PROCEDURE — 99215 OFFICE O/P EST HI 40 MIN: CPT | Performed by: PSYCHIATRY & NEUROLOGY

## 2024-06-20 PROCEDURE — 86596 VOLTAGE-GTD CA CHNL ANTB EA: CPT | Mod: 90 | Performed by: PATHOLOGY

## 2024-06-20 PROCEDURE — 83519 RIA NONANTIBODY: CPT | Mod: 90 | Performed by: PATHOLOGY

## 2024-06-20 PROCEDURE — 36415 COLL VENOUS BLD VENIPUNCTURE: CPT | Performed by: PATHOLOGY

## 2024-06-20 PROCEDURE — 99000 SPECIMEN HANDLING OFFICE-LAB: CPT | Performed by: PATHOLOGY

## 2024-06-20 PROCEDURE — 86255 FLUORESCENT ANTIBODY SCREEN: CPT | Mod: 90 | Performed by: PATHOLOGY

## 2024-06-20 PROCEDURE — 84446 ASSAY OF VITAMIN E: CPT | Mod: 90 | Performed by: PATHOLOGY

## 2024-06-20 ASSESSMENT — PAIN SCALES - GENERAL: PAINLEVEL: NO PAIN (0)

## 2024-06-20 NOTE — NURSING NOTE
Chief Complaint   Patient presents with    RECHECK     /75 (BP Location: Right arm, Patient Position: Sitting, Cuff Size: Adult Regular)   Pulse 69   Resp 16   Wt 72.6 kg (160 lb 1.6 oz)   SpO2 96%   BMI 24.34 kg/m      JARETT RIVERA

## 2024-06-20 NOTE — LETTER
6/20/2024       RE: Vidya Brink  [address]  [facility] [address]       Dear Colleague,    Thank you for referring your patient, Vidya Brink, to the [facility] NEUROLOGY CLINIC [place] [facility]. Please see a copy of my visit note below.    I met with Ms. Brink in follow-up after January's visit and after reviewing all of the interim events in detail.  I explained that her nerve conduction studies do not demonstrate evidence of neuropathy.  Fibrillation potentials were evident in 1 muscle which she said had experienced a nerve injury in the past.  We personally reviewed her spinal and brain imaging and discussed its implications and I personally reviewed Dr. Alfaro's evaluations as well.  Examination was limited to evaluation of gait.  She walks independently and is able to perform tandem gait.    I discussed the prognosis and differential diagnosis.  I explained that there is no evidence of a neuromuscular etiology of her symptoms that it is more likely than not that it is centrally mediated.  We agreed upon the following action items:    Blood tests today  I will review your MRI with a radiologist with attention to the cerebellum  I will reach out to Dr Alfaro about her recommendations  Let us know if your brother's test for CANVAS is positive    44 minutes spent on the date of the encounter on chart review, history and examination, documentation and further activities as noted above.       Again, thank you for allowing me to participate in the care of your patient.      Sincerely,    Ray Hartley MD

## 2024-06-20 NOTE — PATIENT INSTRUCTIONS
Blood tests today  I will review your MRI with a radiologist with attention to the cerebellum  I will reach out to Dr Alfaro about her recommendations  Let us know if your brother's test for CANVAS is positive    Please call [facility] @ [phone] for questions or concerns during regular business hours. For a more efficient way to communicate, use Kazaana and address the message to your physician. Remember, MyChart is only read during business hours. Do not leave urgent messages on voicemail or Kazaana. If situation is urgent, contact the Neurology Clinic @ [phone] and ask to speak to a Triage Nurse or Call 911 or visit an Emergency Department.     Please call your pharmacy if you need a medication refill. They will send us an electronic message.

## 2024-06-21 NOTE — PROGRESS NOTES
I met with Ms. Brink in follow-up after January's visit and after reviewing all of the interim events in detail.  I explained that her nerve conduction studies do not demonstrate evidence of neuropathy.  Fibrillation potentials were evident in 1 muscle which she said had experienced a nerve injury in the past.  We personally reviewed her spinal and brain imaging and discussed its implications and I personally reviewed Dr. Alfaro's evaluations as well.  Examination was limited to evaluation of gait.  She walks independently and is able to perform tandem gait.    I discussed the prognosis and differential diagnosis.  I explained that there is no evidence of a neuromuscular etiology of her symptoms that it is more likely than not that it is centrally mediated.  We agreed upon the following action items:    Blood tests today  I will review your MRI with a radiologist with attention to the cerebellum  I will reach out to Dr Alfaro about her recommendations  Let us know if your brother's test for CANVAS is positive    Ray Hartley M.D.    44 minutes spent on the date of the encounter on chart review, history and examination, documentation and further activities as noted above.

## 2024-06-23 LAB
A-TOCOPHEROL VIT E SERPL-MCNC: 11.5 MG/L
BETA+GAMMA TOCOPHEROL SERPL-MCNC: 1.4 MG/L

## 2024-06-27 LAB
AMPHIPHYSIN AB TITR SER: NEGATIVE {TITER}
ANNOTATION COMMENT IMP: NORMAL
CV2 IGG TITR SER: NEGATIVE {TITER}
GLIAL NUC TYPE 1 AB TITR SER: NEGATIVE {TITER}
HU1 AB TITR SER: NEGATIVE {TITER}
HU2 AB TITR SER IF: NEGATIVE {TITER}
HU3 AB TITR SER: NEGATIVE {TITER}
IMMUNOLOGIST REVIEW: NORMAL
PCA-1 AB TITR SER: NEGATIVE {TITER}
PCA-2 AB TITR SER: NEGATIVE {TITER}
PCA-TR AB TITR SER IF: NEGATIVE {TITER}
VGCC-P/Q BIND AB SER-SCNC: 0 NMOL/L
VGKC AB SER-SCNC: 0 NMOL/L

## 2024-07-26 ENCOUNTER — VIRTUAL VISIT (OUTPATIENT)
Dept: NEUROLOGY | Facility: CLINIC | Age: 74
End: 2024-07-26
Payer: COMMERCIAL

## 2024-07-26 VITALS — WEIGHT: 154 LBS | HEIGHT: 68 IN | BODY MASS INDEX: 23.34 KG/M2

## 2024-07-26 DIAGNOSIS — G93.89 CEREBRAL VENTRICULOMEGALY: Primary | ICD-10-CM

## 2024-07-26 DIAGNOSIS — R26.89 IMBALANCE: ICD-10-CM

## 2024-07-26 DIAGNOSIS — I87.1 COMPRESSION OF VEIN: ICD-10-CM

## 2024-07-26 PROCEDURE — 99215 OFFICE O/P EST HI 40 MIN: CPT | Mod: 95 | Performed by: PSYCHIATRY & NEUROLOGY

## 2024-07-26 ASSESSMENT — PAIN SCALES - GENERAL: PAINLEVEL: NO PAIN (0)

## 2024-07-26 NOTE — PROGRESS NOTES
"Virtual Visit Details    Type of service:  Video Visit     Originating Location (pt. Location): {video visit patient location:162931::\"Home\"}  {PROVIDER LOCATION On-site should be selected for visits conducted from your clinic location or adjoining [facility] hospital, academic office, or other nearby [facility] building. Off-site should be selected for all other provider locations, including home:940513}  Distant Location (provider location):  {virtual location provider:550497}  Platform used for Video Visit: {Virtual Visit Platforms:448745::\"[email or web address]\"}  "

## 2024-07-26 NOTE — LETTER
7/26/2024       RE: Vidya Brink  [address]  [facility] [address]     Dear Colleague,    Thank you for referring your patient, Vidya Brink, to the [facility]UROLOGY CLINIC [facility]. Please see a copy of my visit note below.    The patient is being evaluated via a billable video visit.    How would you like to obtain your AVS? MyChart  If the video visit is dropped, the invitation should be resent by: Send to e-mail at: [email or web address]  Will anyone else be joining your video visit? No        Follow-up 4-3-24, 5-31-24  Vidya Brink is a 74 year old female with a past medical history of James syndrome, ventriculomegaly, referred for a chronic sense of rocking vertigo as if she were on a boat that started after a fall. This is accompanied by a loss of balance. Extraocular movements are normal and there are no cerebellar signs. Though there is a family member with chronic cough, vestibulopathy, and peripheral neuropathy who would meet criteria for CANVAS, the patient herself does not exhibit any of these features. Incomplete penetrance is always a possibility.      The specific kind of vertigo experienced by the patient is typically vascular on the venous side. Moreover, she has ventriculomegaly and cerebral white matter disease which would both independently contribute to gait dysfunction. We discussed the multifactorial nature of gait dysfunction. We could start an evaluation for venous compression physiology in the neck and upper chest starting with an ultrasound. If abnormal, we can follow up with a CTVenogram head and neck. She has some mild pain over the left sternum and pectoralis minor tendon. Starting a physical therapy program based on these abnormalities might be helpful. She is happy to follow that algorithm.      Recommendations:  US TOS/internal jugular vein   CT venogram head and neck after  ultrasound  Physical therapy--1st rib mobilization, anterior scalene stretches, ergonomic awareness  Follow-up after imaging     Interim History 5/31/2024:  Cognition: Right now, she feels that she takes longer to make decisions. But overall, she is doing well. She doesn't get lost. She can plan a day and follow-through. She schedules and meets her appointments. She is driving, not having accidents. She can be some car sickness. The rocking on a boat feeling is gone.   No urinary incontinence.   Main problem is that she can't move fast because of a feeling of wooziness. She mostly has head motion sensitivity. Sometimes her feet can feel a little incoordinated. Feet are not stuck on the floor.     We discussed the potential evaluation of work-up for Normal Pressure Hydrocephalus but she does not have the classic features of this. She walks 1 hour a day. She does not sleep well because of the need to go to the bathroom. She can sleep about 3 hours a day. She goes to bed to at midnight.      PLAN:  PT gait imbalance and posture and posterior neck strengthening  Try 0.5-1mg melatonin in the middle of the night.   Watchful waiting and symptom assessment for NPH--would refer to the NPH program in neurosurgery  Follow-up as needed     Interim History 7/26/2024:  She is working really hard of the physical therapy, working on the neck, shoulder, and leg strength. Her legs feel stronger.   She is not falling, no gait assistance. She does feel off balance when walking long distances.     She is unsettled about not having a diagnosis and the problems with having an untreated problem. Her legs do get tired but they don't hurt. She also has osteoarthritis in both knees especially with doing the stairs.     Patient is interested the NPH evaluation even if she is not offered surgery now.     PLAN:  Trial [facility]  Avoid prolonged neck flexion  Continue the PT  Referral to neurosurgery for ventriculomegaly related to  gait      DATA:  I personally reviewed the following data.    Last brain imaging:  CTV HEAD NECK W CONTRAST, 5/7/2024      HISTORY:  73F with rocking vertigo, gait dysfunction, question venous  compression, styloid length.; Compression of vein; Vertigo; Imbalance;  Neck pain.    FINDINGS:  No thrombosis or stenosis of the major intracranial dural  sinuses or deep cerebral veins.      Ventricles are enlarged relative to the cerebral sulci, similar to the  examination on 1/17/2023. Incidental developmental venous anomaly in  the the lateral right frontal lobe.     The right styloid process measures 31 mm. The left styloid process  measures 28 mm.     RIGHT:  In the neutral position, there is no significant narrowing of the  right internal jugular vein. With neck flexion, the right internal  jugular vein remains patent. No paravertebral venous dilatation.     LEFT:  In the neutral position, there is no significant narrowing of the  right internal jugular vein. With neck flexion, there is mild  narrowing of the upper left internal jugular vein as it courses  between the styloid process and left C1 transverse process. The left  internal jugular vein otherwise remains patent. No paravertebral  venous dilatation.     Clear paranasal sinuses and mastoid air cells. The imaged skull base,  intracranial and orbital structures are within normal limits.  Subcentimeter bilateral thyroid nodules requiring no further follow-up  by imaging criteria. No suspicious finding in the visualized superior  mediastinum/thorax.      Clear lung apices. Mild biapical pulmonary scarring.                                                                  IMPRESSION:   1. No narrowing of the internal jugular veins in the neutral position.  Mild narrowing of the upper left internal jugular vein with neck  flexion. This is of doubtful clinical significance and can be seen in  the absence of symptoms.  2. Patent dural venous sinuses and major deep  intracranial veins.  3. Ventriculomegaly suggestive of normal pressure hydrocephalus,  unchanged since 2023.     DAVID GUILLERMO MD       DX Bone Density  Narrative: EXAM: DX TBS AXIAL  LOCATION: [facility]  DATE: 5/30/2024    INDICATION: , postmenopausal, bone fracture during adulthood, family history of a hip fracture in a first-degree relative, bisphosphonate use.  DEMOGRAPHICS: Age- 74 years. Gender- Female. Menopausal status- Postmenopausal.  COMPARISON: 6/7/2021  TECHNIQUE: Dual-energy x-ray absorptiometry (DXA) performed with routine technique.  Trabecular bone score (TBS) analysis performed.    FINDINGS:    DXA RESULTS  -Lumbar Spine: L1-L4: BMD: 0.859 g/cm2. T-score: -2.7. Z-score: -0.9. Degenerative change may artifactually increase BMD.  -RIGHT Hip Total: BMD: 0.793 g/cm2. T-score: -1.7. Z-score: 0.0.  -RIGHT Hip Femoral neck: BMD: 0.811 g/cm2. T-score: -1.6. Z-score: 0.3.  -LEFT Hip Total: BMD: 0.768 g/cm2. T-score: -1.9. Z-score: -0.2.  -LEFT Hip Femoral neck: BMD: 0.770 g/cm2. T-score: -1.9. Z-score: 0.0.    WHO T-SCORE CRITERIA  -Normal: T score at or above -1 SD  -Osteopenia: T score between -1 and -2.5 SD  -Osteoporosis: T score at or below -2.5 SD    The World Health Organization (WHO) criteria is applicable to perimenopausal females, postmenopausal females, and men aged 50 years or older.    TBS RESULTS  -Lumbar Spine L1-L4: TBS: 1.154. TBS T-score: -3.3.TBS Z-score: -0.8.    The TBS is a DXA derived measurement for fracture risk assessment, and reflects the structural condition of the bone microarchitecture. It can be used to adjust WHO Fracture Risk Assessment Tool (FRAX) probability of fracture in postmenopausal women and   older men. The calculated probabilities of fracture have been shown to be more accurate when computed with the TBS.    INTERVAL CHANGE  -There has been a 3.4% decrease in lumbar spine BMD.  -There has been a 2.0% decrease in bilateral hip  BMD.    FRACTURE RISK  -The FRAX risk calculator is not applicable due to osteoporosis.    RECOMMENDATIONS  The patient's BMD is consistent with osteoporosis, and he/she is at increased fracture risk. If not currently being treated for low BMD, this would merit treatment according to the Bone Health and Osteoporosis Foundation.  Impression: IMPRESSION: OSTEOPOROSIS. T score meets the WHO criteria for osteoporosis at one or more measured sites. The risk of osteoporotic fracture increases approximately two-fold for each standard deviation decrease in T-score.     47-minutes were spent in evaluation, counseling, and documentation on the date of service.          Again, thank you for allowing me to participate in the care of your patient.      Sincerely,    Jose MARKHAM Cha, MD

## 2024-07-26 NOTE — NURSING NOTE
Current patient location: [address]  [facility] [address]    Is the patient currently in the state of [place]? YES    Visit mode:VIDEO    If the visit is dropped, the patient can be reconnected by: VIDEO VISIT: Send to e-mail at: [email or web address]    Will anyone else be joining the visit? NO  (If patient encounters technical issues they should call [phone])    How would you like to obtain your AVS? MyChart    Are changes needed to the allergy or medication list? Pt stated no changes to allergies and Pt stated no med changes    Are refills needed on medications prescribed by this physician? NO    Reason for visit: CARLOS AKBARF

## 2024-07-26 NOTE — PROGRESS NOTES
The patient is being evaluated via a billable video visit.    How would you like to obtain your AVS? MyChart  If the video visit is dropped, the invitation should be resent by: Send to e-mail at: [email or web address]  Will anyone else be joining your video visit? No      Video-Visit Details  Type of service:  Video Visit  Video Start Time:1:33 PM  Video End Time:2:16 PM  Originating Location (pt. Location): Home  Distant Location (provider location):  [facility] NEUROLOGY [facility]   Platform used for Video Visit: [facility]    Follow-up 4-3-24, 5-31-24  Vidya Brink is a 74 year old female with a past medical history of James syndrome, ventriculomegaly, referred for a chronic sense of rocking vertigo as if she were on a boat that started after a fall. This is accompanied by a loss of balance. Extraocular movements are normal and there are no cerebellar signs. Though there is a family member with chronic cough, vestibulopathy, and peripheral neuropathy who would meet criteria for CANVAS, the patient herself does not exhibit any of these features. Incomplete penetrance is always a possibility.      The specific kind of vertigo experienced by the patient is typically vascular on the venous side. Moreover, she has ventriculomegaly and cerebral white matter disease which would both independently contribute to gait dysfunction. We discussed the multifactorial nature of gait dysfunction. We could start an evaluation for venous compression physiology in the neck and upper chest starting with an ultrasound. If abnormal, we can follow up with a CTVenogram head and neck. She has some mild pain over the left sternum and pectoralis minor tendon. Starting a physical therapy program based on these abnormalities might be helpful. She is happy to follow that algorithm.      Recommendations:  US TOS/internal jugular vein   CT venogram head and neck after ultrasound  Physical therapy--1st rib mobilization,  anterior scalene stretches, ergonomic awareness  Follow-up after imaging     Interim History 5/31/2024:  Cognition: Right now, she feels that she takes longer to make decisions. But overall, she is doing well. She doesn't get lost. She can plan a day and follow-through. She schedules and meets her appointments. She is driving, not having accidents. She can be some car sickness. The rocking on a boat feeling is gone.   No urinary incontinence.   Main problem is that she can't move fast because of a feeling of wooziness. She mostly has head motion sensitivity. Sometimes her feet can feel a little incoordinated. Feet are not stuck on the floor.     We discussed the potential evaluation of work-up for Normal Pressure Hydrocephalus but she does not have the classic features of this. She walks 1 hour a day. She does not sleep well because of the need to go to the bathroom. She can sleep about 3 hours a day. She goes to bed to at midnight.      PLAN:  PT gait imbalance and posture and posterior neck strengthening  Try 0.5-1mg melatonin in the middle of the night.   Watchful waiting and symptom assessment for NPH--would refer to the NPH program in neurosurgery  Follow-up as needed     Interim History 7/26/2024:  She is working really hard of the physical therapy, working on the neck, shoulder, and leg strength. Her legs feel stronger.   She is not falling, no gait assistance. She does feel off balance when walking long distances.     She is unsettled about not having a diagnosis and the problems with having an untreated problem. Her legs do get tired but they don't hurt. She also has osteoarthritis in both knees especially with doing the stairs.     Patient is interested the NPH evaluation even if she is not offered surgery now.     PLAN:  Trial [facility]  Avoid prolonged neck flexion  Continue the PT  Referral to neurosurgery for ventriculomegaly related to gait      DATA:  I personally reviewed the following  data.    Last brain imaging:  CTV HEAD NECK W CONTRAST, 5/7/2024      HISTORY:  73F with rocking vertigo, gait dysfunction, question venous  compression, styloid length.; Compression of vein; Vertigo; Imbalance;  Neck pain.    FINDINGS:  No thrombosis or stenosis of the major intracranial dural  sinuses or deep cerebral veins.      Ventricles are enlarged relative to the cerebral sulci, similar to the  examination on 1/17/2023. Incidental developmental venous anomaly in  the the lateral right frontal lobe.     The right styloid process measures 31 mm. The left styloid process  measures 28 mm.     RIGHT:  In the neutral position, there is no significant narrowing of the  right internal jugular vein. With neck flexion, the right internal  jugular vein remains patent. No paravertebral venous dilatation.     LEFT:  In the neutral position, there is no significant narrowing of the  right internal jugular vein. With neck flexion, there is mild  narrowing of the upper left internal jugular vein as it courses  between the styloid process and left C1 transverse process. The left  internal jugular vein otherwise remains patent. No paravertebral  venous dilatation.     Clear paranasal sinuses and mastoid air cells. The imaged skull base,  intracranial and orbital structures are within normal limits.  Subcentimeter bilateral thyroid nodules requiring no further follow-up  by imaging criteria. No suspicious finding in the visualized superior  mediastinum/thorax.      Clear lung apices. Mild biapical pulmonary scarring.                                                                  IMPRESSION:   1. No narrowing of the internal jugular veins in the neutral position.  Mild narrowing of the upper left internal jugular vein with neck  flexion. This is of doubtful clinical significance and can be seen in  the absence of symptoms.  2. Patent dural venous sinuses and major deep intracranial veins.  3. Ventriculomegaly suggestive of  normal pressure hydrocephalus,  unchanged since 2023.     DAVID GUILLERMO MD       DX Bone Density  Narrative: EXAM: DX TBS AXIAL  LOCATION: [facility]  DATE: 5/30/2024    INDICATION: , postmenopausal, bone fracture during adulthood, family history of a hip fracture in a first-degree relative, bisphosphonate use.  DEMOGRAPHICS: Age- 74 years. Gender- Female. Menopausal status- Postmenopausal.  COMPARISON: 6/7/2021  TECHNIQUE: Dual-energy x-ray absorptiometry (DXA) performed with routine technique.  Trabecular bone score (TBS) analysis performed.    FINDINGS:    DXA RESULTS  -Lumbar Spine: L1-L4: BMD: 0.859 g/cm2. T-score: -2.7. Z-score: -0.9. Degenerative change may artifactually increase BMD.  -RIGHT Hip Total: BMD: 0.793 g/cm2. T-score: -1.7. Z-score: 0.0.  -RIGHT Hip Femoral neck: BMD: 0.811 g/cm2. T-score: -1.6. Z-score: 0.3.  -LEFT Hip Total: BMD: 0.768 g/cm2. T-score: -1.9. Z-score: -0.2.  -LEFT Hip Femoral neck: BMD: 0.770 g/cm2. T-score: -1.9. Z-score: 0.0.    WHO T-SCORE CRITERIA  -Normal: T score at or above -1 SD  -Osteopenia: T score between -1 and -2.5 SD  -Osteoporosis: T score at or below -2.5 SD    The World Health Organization (WHO) criteria is applicable to perimenopausal females, postmenopausal females, and men aged 50 years or older.    TBS RESULTS  -Lumbar Spine L1-L4: TBS: 1.154. TBS T-score: -3.3.TBS Z-score: -0.8.    The TBS is a DXA derived measurement for fracture risk assessment, and reflects the structural condition of the bone microarchitecture. It can be used to adjust WHO Fracture Risk Assessment Tool (FRAX) probability of fracture in postmenopausal women and   older men. The calculated probabilities of fracture have been shown to be more accurate when computed with the TBS.    INTERVAL CHANGE  -There has been a 3.4% decrease in lumbar spine BMD.  -There has been a 2.0% decrease in bilateral hip BMD.    FRACTURE RISK  -The FRAX risk calculator is not  applicable due to osteoporosis.    RECOMMENDATIONS  The patient's BMD is consistent with osteoporosis, and he/she is at increased fracture risk. If not currently being treated for low BMD, this would merit treatment according to the Bone Health and Osteoporosis Foundation.  Impression: IMPRESSION: OSTEOPOROSIS. T score meets the WHO criteria for osteoporosis at one or more measured sites. The risk of osteoporotic fracture increases approximately two-fold for each standard deviation decrease in T-score.     47-minutes were spent in evaluation, counseling, and documentation on the date of service.     [address] Exp Problem Focused - Low Complex

## 2024-08-26 NOTE — TELEPHONE ENCOUNTER
RECORDS RECEIVED FROM: Internal    REASON FOR VISIT:  Cerebral ventriculomegaly   PROVIDER: Saul Spangler MD   DATE OF APPT: 10/15/24 @ 2:00 pm    NOTES (FOR ALL VISITS) STATUS DETAILS   OFFICE NOTE from referring provider Internal 7/26/24, 5/31/24, 4/3/24 Jose Alfaro MD @[facility]-Neuro     OFFICE NOTE from other specialist Internal [facility]20[facility]24 [facility]     [facility] Care Everywhere [facility]  3/20/24 EMG     MEDICATION LIST Internal    IMAGING  (FOR ALL VISITS)     MRI (HEAD, NECK, SPINE) Internal [facility]  1/30/24 MR Cervical Spine  1/17/23 MR Brain     CT (HEAD, NECK, SPINE) Internal [facility]  5/7/24 CTV Head Neck

## 2024-09-16 ENCOUNTER — LAB REQUISITION (OUTPATIENT)
Dept: LAB | Facility: CLINIC | Age: 74
End: 2024-09-16
Payer: COMMERCIAL

## 2024-09-16 DIAGNOSIS — D48.5 NEOPLASM OF UNCERTAIN BEHAVIOR OF SKIN: ICD-10-CM

## 2024-09-16 PROCEDURE — 88305 TISSUE EXAM BY PATHOLOGIST: CPT | Mod: TC,ORL | Performed by: DERMATOLOGY

## 2024-09-16 PROCEDURE — 88305 TISSUE EXAM BY PATHOLOGIST: CPT | Mod: 26 | Performed by: DERMATOLOGY

## 2024-09-18 LAB
PATH REPORT.COMMENTS IMP SPEC: NORMAL
PATH REPORT.COMMENTS IMP SPEC: NORMAL
PATH REPORT.FINAL DX SPEC: NORMAL
PATH REPORT.GROSS SPEC: NORMAL
PATH REPORT.MICROSCOPIC SPEC OTHER STN: NORMAL
PATH REPORT.RELEVANT HX SPEC: NORMAL

## 2024-10-15 ENCOUNTER — PRE VISIT (OUTPATIENT)
Dept: NEUROSURGERY | Facility: CLINIC | Age: 74
End: 2024-10-15

## 2024-10-15 ENCOUNTER — TELEPHONE (OUTPATIENT)
Dept: NEUROSURGERY | Facility: CLINIC | Age: 74
End: 2024-10-15

## 2024-10-15 NOTE — TELEPHONE ENCOUNTER
Called patient and rescheduled appointment from Dr. Spangler to Blanche Vega per Kamille Pratt via task. -KB

## 2024-10-29 ENCOUNTER — OFFICE VISIT (OUTPATIENT)
Dept: NEUROSURGERY | Facility: CLINIC | Age: 74
End: 2024-10-29
Attending: PSYCHIATRY & NEUROLOGY
Payer: COMMERCIAL

## 2024-10-29 VITALS
HEIGHT: 68 IN | RESPIRATION RATE: 16 BRPM | BODY MASS INDEX: 23.64 KG/M2 | HEART RATE: 76 BPM | WEIGHT: 156 LBS | DIASTOLIC BLOOD PRESSURE: 75 MMHG | SYSTOLIC BLOOD PRESSURE: 128 MMHG | OXYGEN SATURATION: 97 %

## 2024-10-29 DIAGNOSIS — G93.89 CEREBRAL VENTRICULOMEGALY: ICD-10-CM

## 2024-10-29 PROCEDURE — 99204 OFFICE O/P NEW MOD 45 MIN: CPT | Performed by: NURSE PRACTITIONER

## 2024-10-29 ASSESSMENT — PAIN SCALES - GENERAL: PAINLEVEL_OUTOF10: NO PAIN (0)

## 2024-10-29 NOTE — LETTER
"10/29/2024       RE: Vidya Brink  [address]  [place] [address]     Dear Colleague,    Thank you for referring your patient, Vidya Brink, to the [facility] NEUROSURGERY CLINIC [place] [facility]. Please see a copy of my visit note below.    [facility]  Department of Neurosurgery      Name: Vidya Brink  MRN: [id number]  Age: 74 year old  : 1950  Referring provider: Jose ACEVEDO Cha  10/30/2024      Chief Complaint:   Normal pressure hydrocephalus  New patient    History of Present Illness:   Vidya Brink is a 74 year old female with a history of James syndrome, vertigo who is here today for recent diagnosis of NPH.    Today patient presents for the visit with her , Juarez.  Patient had a fall in 2022 and has been experiencing gait issues/unsteadiness since then.  Patient reports this as \" feels like I am in a moving boat\".  Lately her symptoms has been worsening.  She continues to be able to walk independently.    In regards to cognition, she feels slower forgets names and her processing time is slower.  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    She denies any urinary symptoms.  She reports sudden loss of hearing before the onset of unsteadiness symptoms. She also has tinnitus, bilateral.     Patient was previously seen by Piper Hartley and Angelina in our neurology department.  Also was evaluated by a provider outside of [place].  Had extensive evaluations including high-volume spinal tap.  Per report, wide-based gait and difficulty tandem walking in.  Similar prior to and following lumbar puncture.    Patient's dad has a history of Parkinson's.  Her brother has history of imbalance and unexplained cough.  Possible diagnosis of CANVAS was discussed previously but based on Dr. Alfaro's note from 2024, Though there is a family member with chronic cough, " "vestibulopathy, and peripheral neuropathy who would meet criteria for CANVAS,  the patient herself does not exhibit any of these features. Incomplete penetrance is always a possibility.     She was referred to us for consideration of lumbar drain trial for further evaluation and possible  shunt placement.      Review of Systems:   Pertinent items are noted in HPI or as in patient entered ROS below, remainder of complete ROS is negative.        No data to display                 Active Medications:     Current Outpatient Medications:      CALCIUM-VITAMIN D PO, Take by mouth daily, Disp: , Rfl:      cholecalciferol 25 MCG (1000 UT) TABS, Take 1,000 Units by mouth, Disp: , Rfl:      cyanocobalamin (VITAMIN B-12) 1000 MCG tablet, , Disp: , Rfl:      ketoconazole (NIZORAL) 2 % external cream, APPLY CREAM TOPICALLY TWICE DAILY TO FEET, Disp: , Rfl:      RISEdronate (ACTONEL) 35 MG tablet, Take 35 mg by mouth, Disp: , Rfl:       Allergies:   Fosamax [alendronate]      Past Medical History:  Past Medical History:   Diagnosis Date     Arthritis      Colon cancer (H) 1989, 1990     Fibrocystic breast      James syndrome      Pneumonia      Patient Active Problem List   Diagnosis     Dizziness     Vertigo     EPCAM-related James syndrome (HNPCC8)        Past Surgical History:  Past Surgical History:   Procedure Laterality Date     ARTHROSCOPY SHOULDER ROTATOR CUFF REPAIR Right      COLON SURGERY      subtotal colectomy     HYSTERECTOMY       LAPAROSCOPIC HERNIORRHAPHY INGUINAL Left 09/17/2020    Procedure: LAPAROSCOPIC LEFT INGUINAL HERNIA REPAIR, POSSIBLE OPEN;  Surgeon: Donny Carrillo MD;  Location: [facility];  Service: General     PELVIC LAPAROSCOPY      endometriosis     SALPINGOOPHORECTOMY         Family History:   Family History   Problem Relation Age of Onset     Ataxia Sister         \"Balance problems\"     Ataxia Brother         \"Balance problems\"     Breast Cancer Paternal Grandmother 48     James " "Syndrome Son         s/p Aleksandr         Social History:   Social History     Tobacco Use     Smoking status: Never     Smokeless tobacco: Never        Physical Exam:   /75 (BP Location: Right arm, Patient Position: Sitting)   Pulse 76   Resp 16   Ht 1.727 m (5' 8\")   Wt 70.8 kg (156 lb)   SpO2 97%   BMI 23.72 kg/m     General: No acute distress.   Neuro: The patient is fully oriented. Speech is normal. Gait is essentially normal.  Tandem slightly impaired.  Psych: Normal mood and affect. Behavior is normal.      Imagin2023 MRI brain:  MRI brain without contrast 2023.  [facility].  1. Moderate ventriculomegaly particularly of the lateral and third ventricles and a mild degree of periventricular white matter hyperintense signal.  While there are no associated imaging findings to suggest a communicating hydrocephalus, and correlation with prior MRI from 2016, these findings are new and could represent normal pressure hydrocephalus with transependymal CSF flow.  Alternative consideration would include central cerebral volume loss.    2. Mild to moderate chronic small vessel ischemic disease.  No acute infarct, hemorrhage, mass or extra-axial fluid collection.     Assessment:  Normal pressure hydrocephalus  New patient    Plan:  Today we reviewed the diagnosis of NPH in detail.  We also discussed lumbar drain trial and possible  shunt placement.  Patient would like to meet with Dr. Orona prior to proceeding with lumbar drain trial.  Message sent to clinic scheduling.    Blanche Vega CNP  Department of Neurosurgery    I spent 55 minutes on patient care activities related to this encounter on the date of service, including time spent reviewing the chart, obtaining history and examination and in counseling the patient, and in documentation in the electronic medical record.      Again, thank you for allowing me to participate in the care of your patient.      Sincerely,    Blanche Crespo" SHANNAN Vega CNP

## 2024-10-30 NOTE — PROGRESS NOTES
"[facility]  Department of Neurosurgery      Name: Vidya Brink  MRN: [id number]  Age: 74 year old  : 1950  Referring provider: Jose ACEVEDO Cha  10/30/2024      Chief Complaint:   Normal pressure hydrocephalus  New patient    History of Present Illness:   Vidya Brink is a 74 year old female with a history of James syndrome, vertigo who is here today for recent diagnosis of NPH.    Today patient presents for the visit with her , Juarez.  Patient had a fall in 2022 and has been experiencing gait issues/unsteadiness since then.  Patient reports this as \" feels like I am in a moving boat\".  Lately her symptoms has been worsening.  She continues to be able to walk independently.    In regards to cognition, she feels slower forgets names and her processing time is slower.  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    She denies any urinary symptoms.  She reports sudden loss of hearing before the onset of unsteadiness symptoms. She also has tinnitus, bilateral.     Patient was previously seen by Piper Hartley and Angelina in our neurology department.  Also was evaluated by a provider outside of [place].  Had extensive evaluations including high-volume spinal tap.  Per report, wide-based gait and difficulty tandem walking in.  Similar prior to and following lumbar puncture.    Patient's dad has a history of Parkinson's.  Her brother has history of imbalance and unexplained cough.  Possible diagnosis of CANVAS was discussed previously but based on Dr. Alfaro's note from 2024, Though there is a family member with chronic cough, vestibulopathy, and peripheral neuropathy who would meet criteria for CANVAS,  the patient herself does not exhibit any of these features. Incomplete penetrance is always a possibility.     She was referred to us for consideration of lumbar drain trial for further evaluation and possible  shunt placement.      Review of " "Systems:   Pertinent items are noted in HPI or as in patient entered ROS below, remainder of complete ROS is negative.        No data to display                 Active Medications:     Current Outpatient Medications:     CALCIUM-VITAMIN D PO, Take by mouth daily, Disp: , Rfl:     cholecalciferol 25 MCG (1000 UT) TABS, Take 1,000 Units by mouth, Disp: , Rfl:     cyanocobalamin (VITAMIN B-12) 1000 MCG tablet, , Disp: , Rfl:     ketoconazole (NIZORAL) 2 % external cream, APPLY CREAM TOPICALLY TWICE DAILY TO FEET, Disp: , Rfl:     RISEdronate (ACTONEL) 35 MG tablet, Take 35 mg by mouth, Disp: , Rfl:       Allergies:   Fosamax [alendronate]      Past Medical History:  Past Medical History:   Diagnosis Date    Arthritis     Colon cancer (H) 1989, 1990    Fibrocystic breast     James syndrome     Pneumonia      Patient Active Problem List   Diagnosis    Dizziness    Vertigo    EPCAM-related James syndrome (HNPCC8)        Past Surgical History:  Past Surgical History:   Procedure Laterality Date    ARTHROSCOPY SHOULDER ROTATOR CUFF REPAIR Right     COLON SURGERY      subtotal colectomy    HYSTERECTOMY      LAPAROSCOPIC HERNIORRHAPHY INGUINAL Left 09/17/2020    Procedure: LAPAROSCOPIC LEFT INGUINAL HERNIA REPAIR, POSSIBLE OPEN;  Surgeon: Donny Carrillo MD;  Location: [facility];  Service: General    PELVIC LAPAROSCOPY      endometriosis    SALPINGOOPHORECTOMY         Family History:   Family History   Problem Relation Age of Onset    Ataxia Sister         \"Balance problems\"    Ataxia Brother         \"Balance problems\"    Breast Cancer Paternal Grandmother 48    James Syndrome Son         s/p Aleksandr         Social History:   Social History     Tobacco Use    Smoking status: Never    Smokeless tobacco: Never        Physical Exam:   /75 (BP Location: Right arm, Patient Position: Sitting)   Pulse 76   Resp 16   Ht 1.727 m (5' 8\")   Wt 70.8 kg (156 lb)   SpO2 97%   BMI 23.72 kg/m     General: No acute " distress.   Neuro: The patient is fully oriented. Speech is normal. Gait is essentially normal.  Tandem slightly impaired.  Psych: Normal mood and affect. Behavior is normal.      Imagin2023 MRI brain:  MRI brain without contrast 2023.  [facility].  1. Moderate ventriculomegaly particularly of the lateral and third ventricles and a mild degree of periventricular white matter hyperintense signal.  While there are no associated imaging findings to suggest a communicating hydrocephalus, and correlation with prior MRI from 2016, these findings are new and could represent normal pressure hydrocephalus with transependymal CSF flow.  Alternative consideration would include central cerebral volume loss.    2. Mild to moderate chronic small vessel ischemic disease.  No acute infarct, hemorrhage, mass or extra-axial fluid collection.     Assessment:  Normal pressure hydrocephalus  New patient    Plan:  Today we reviewed the diagnosis of NPH in detail.  We also discussed lumbar drain trial and possible  shunt placement.  Patient would like to meet with Dr. Orona prior to proceeding with lumbar drain trial.  Message sent to clinic scheduling.    Blanche Vega CNP  Department of Neurosurgery    I spent 55 minutes on patient care activities related to this encounter on the date of service, including time spent reviewing the chart, obtaining history and examination and in counseling the patient, and in documentation in the electronic medical record.

## 2024-11-05 ENCOUNTER — TELEPHONE (OUTPATIENT)
Dept: NEUROSURGERY | Facility: CLINIC | Age: 74
End: 2024-11-05
Payer: COMMERCIAL

## 2024-11-05 NOTE — TELEPHONE ENCOUNTER
[facility] Call Center    Phone Message    May a detailed message be left on voicemail: yes     Reason for Call: Other: Patient is calling requesting a call back from Nai for codes needed, please call back.      Action Taken: Message routed to:  [facility] ([facility]    Travel Screening: Not Applicable

## 2024-11-05 NOTE — TELEPHONE ENCOUNTER
Patient called in and said she hasn't heard anything about scheduling with Dr. Orona and would like a call to schedule [facility]  11/5/2024 at 2:18 PM

## 2024-11-05 NOTE — TELEPHONE ENCOUNTER
Returned call to pt, as she has not received a call from scheduling yet to schedule a consult with Dr. Orona (pt expressed to Blanche Vega that she wanted to meet with Dr. Orona b/f proceeding with the LD trial). I let pt know that Dr. Orona's soonest opening is late December/early January. Pt does not want to delay scheduling the LD trial until after that appointment, so she would like me to start coordinating the trial now.     I explained the process, including baseline neuropsych, 4 day hospital admission, PT evals during admission, and follow-up neuropsych evaluation. Pt understands that scheduling is somewhat dependent on neuropsych's availability. Following the trial, pt will have an appt with Dr. Orona to discuss results of the trial, whether she is a candidate for a shunt, and the shunt surgery itself.    Pt asked if would make sense to rule out CANVAS first. Pt's brother may have this condition and pt thinks getting tested would be much less invasive that the LD trial (pt said her brother has not gotten tested). I will follow-up with Dr. Alfaro, though her 7/26 note indicates that pt's symptoms do not meet the criteria for CANVAS.     Pt expressed understanding of the above information. I will follow up with neuropsych to get some possible dates; will also follow-up with Dr. Alfaro re CANVAS. Pt in agreement with plan. Nothing further at this time.

## 2024-11-06 NOTE — TELEPHONE ENCOUNTER
[facility] Call Center    Phone Message    May a detailed message be left on voicemail: yes     Reason for Call: Other: Patient called in to follow up about timing of appts for lumbar drain trial. Patient would like to be contacted to discuss scheduling issues with  also needing procedures and explained that she needs to plan carefully so they are able to help each other out. Pt also following up about CPT codes needed for insurance purposes. Please call Pt back to discuss when available, thanks!       Action Taken: Message routed to:  [facility] ([facility]): Neurosurgery    Travel Screening: Not Applicable     Date of Service:

## 2024-11-14 NOTE — TELEPHONE ENCOUNTER
[facility] Call Center    Phone Message    May a detailed message be left on voicemail: yes     Reason for Call: Other: Pt is calling and stating that she needs a neuropsychology test and a drain test before seeing . Please call Pt back to discuss.      Action Taken: Message routed to:  [facility] ([facility]): Neurosurgery    Travel Screening: Not Applicable     Date of Service:

## 2024-11-14 NOTE — TELEPHONE ENCOUNTER
RECORDS RECEIVED FROM: Internal    REASON FOR VISIT: NPH - lumbar drain trial and possible  shunt placement   PROVIDER: Naveen Orona MD   DATE OF APPT: 12/24/24 @ 10:00 am    NOTES (FOR ALL VISITS) STATUS DETAILS   OFFICE NOTE from referring provider Internal 7/26/24, 5/31/24, 4/3/24 [facility]     OFFICE NOTE from other specialist Internal 10/29/24 Blanche Vega APRN CNP @[facility]    6/20/24  [facility]      EMG Received Nisa  3/20/24 EMG      MEDICATION LIST Internal    IMAGING  (FOR ALL VISITS)     MRI (HEAD, NECK, SPINE) Internal [facility]  1/30/24 MR Cervical Spine  1/17/23 MR Brain     CT (HEAD, NECK, SPINE) Internal [facility]  5/7/24 CTV Head Neck

## 2024-11-18 ENCOUNTER — TELEPHONE (OUTPATIENT)
Dept: NEUROSURGERY | Facility: CLINIC | Age: 74
End: 2024-11-18
Payer: COMMERCIAL

## 2024-11-18 DIAGNOSIS — G91.2 IDIOPATHIC NORMAL PRESSURE HYDROCEPHALUS (INPH) (H): ICD-10-CM

## 2024-11-18 DIAGNOSIS — G93.89 CEREBRAL VENTRICULOMEGALY: Primary | ICD-10-CM

## 2024-11-18 NOTE — TELEPHONE ENCOUNTER
Spoke with pt about proposed lumbar drain trial dates:  1/13/25: Neuropsych eval, time to be determined but most likely 8:00 am  1/27/25: Hospital admission  1/30/25: Inpatient neuropsych eval and hospital discharge    Pt would like to keep the clinic appt with Dr. Orona on 12/24/24 at 10:00 a.m.     I will send an itinerary and information about the lumbar drain trial once everything is scheduled. Pt expressed understanding of the above and has no further questions at this time.

## 2024-11-25 ENCOUNTER — TELEPHONE (OUTPATIENT)
Dept: NEUROSURGERY | Facility: CLINIC | Age: 74
End: 2024-11-25
Payer: COMMERCIAL

## 2024-12-24 ENCOUNTER — PRE VISIT (OUTPATIENT)
Dept: NEUROSURGERY | Facility: CLINIC | Age: 74
End: 2024-12-24

## 2024-12-24 ENCOUNTER — OFFICE VISIT (OUTPATIENT)
Dept: NEUROSURGERY | Facility: CLINIC | Age: 74
End: 2024-12-24
Attending: NURSE PRACTITIONER
Payer: COMMERCIAL

## 2024-12-24 VITALS
RESPIRATION RATE: 16 BRPM | HEART RATE: 96 BPM | SYSTOLIC BLOOD PRESSURE: 129 MMHG | OXYGEN SATURATION: 99 % | DIASTOLIC BLOOD PRESSURE: 74 MMHG

## 2024-12-24 DIAGNOSIS — G91.2 NORMAL PRESSURE HYDROCEPHALUS (H): Primary | ICD-10-CM

## 2024-12-24 PROCEDURE — 99213 OFFICE O/P EST LOW 20 MIN: CPT | Performed by: NEUROLOGICAL SURGERY

## 2024-12-24 ASSESSMENT — PAIN SCALES - GENERAL: PAINLEVEL_OUTOF10: NO PAIN (0)

## 2024-12-24 NOTE — LETTER
12/24/2024       RE: Vidya Brink  [address]  [facility] [place] [address]     Dear Colleague,    Thank you for referring your patient, Vidya Brink, to the [facility] [place] at [facility]. Please see a copy of my visit note below.    Neurosurgery Attending NPH Consult Note    HPI: Vidya Brink is a 75 y/o woman with possible NPH who is seen in neurosurgical consultation regarding potential lumbar drain placement. She had a fall two years ago and has been having gait and balance issues since that time. She feels quite unsteady when she walks, not vertigo per se or having the room spin, but more like she's on the deck of a boat in the water. She feels her unsteadiness has been progressively worsening over the last 6 months to a year. She is able to ambulate on her own and has not had any falls since her original fall two years ago. In terms of cognition, she feels she processes things more slowly and is more forgetful. She is obviously operating from a very high baseline though as she is a , is still working, and has an organized legal sheet of questions and items to discuss at her visit today. No urinary symptoms. She has been seen by Dr. Alfaro who thinks that her gait and balance issues may be multifactorial with contributions from venous vascular dysfunction as well as hydrocephalus. As such, she is undergoing a PT program and is obtaining venous imaging to discuss with Dr. Alfaro again. Dr. Alfaro notes that she does not meet criteria for CANVAS syndrome but has a family member who does. She did have a high volume LP at another facility without much improvement. Vidya states that it seemed unclear as to what their evaluation method was and so she was looking for another opinion related to NPH.    Medications:   Current Outpatient Medications:      CALCIUM-VITAMIN D PO, Take by mouth daily, Disp: , Rfl:       cholecalciferol 25 MCG (1000 UT) TABS, Take 1,000 Units by mouth, Disp: , Rfl:      cyanocobalamin (VITAMIN B-12) 1000 MCG tablet, , Disp: , Rfl:      ketoconazole (NIZORAL) 2 % external cream, APPLY CREAM TOPICALLY TWICE DAILY TO FEET, Disp: , Rfl:      RISEdronate (ACTONEL) 35 MG tablet, Take 35 mg by mouth, Disp: , Rfl:     Imaging: CT head shows ventricular enlargement out of proportion to cortical atrophy. Amaro index = 0.33    Prior testing: as above    Exam:  Awake, alert, oriented x 3  Attends  Follows  [employer]  PERRL  EOMI  FS  TML  BUE/BLE 5/5, no drift  Gait with slightly impaired step length, height. Normal base    A/P: 74 year old woman with possible NPH, multifactorial gait/balance disorder. Her symptoms, particularly the feeling of being on a boat, are not classic for NPH. Her imaging indicates that this could be a factor. We discussed that gait typically improves quickly after  shunt, but balance takes longer, up to a year, before it starts to plateau. Given that she has multiple possible factors that could be affecting her balance, she may only get partial improvement, or none at all. Lumbar drain testing is reasonable to proceed with, given the above. Given her cognitive baseline, I think it would be reasonable to proceed with neuropsychological evaluation beforehand.       Today, we discussed the details of a lumbar drain trial. In short, this consists of a 3 day admission in which she will undergo quantitative kinematic, physical therapy and cognitive testing before and after 3 days of CSF drainage via a lumbar drain. After the admission, she will be discharged and see me in clinic the following week. In the interim, I will analyze the data from the admission and then, in discussion with her and her family, we will decide whether or not to proceed with ventriculoperitoneal shunting.      We did discuss the lumbar drain procedure in detail. The risks, benefits and alternative therapies were  discussed with the patient, including but not limited to bleeding, infection, CSF leak, headache.  All questions were answered and she expressed understanding.    - Schedule for lumbar drain testing      Again, thank you for allowing me to participate in the care of your patient.      Sincerely,    Naveen Orona MD

## 2025-01-07 NOTE — PROGRESS NOTES
Neurosurgery Attending NPH Consult Note    HPI: Vidya Brink is a 73 y/o woman with possible NPH who is seen in neurosurgical consultation regarding potential lumbar drain placement. She had a fall two years ago and has been having gait and balance issues since that time. She feels quite unsteady when she walks, not vertigo per se or having the room spin, but more like she's on the deck of a boat in the water. She feels her unsteadiness has been progressively worsening over the last 6 months to a year. She is able to ambulate on her own and has not had any falls since her original fall two years ago. In terms of cognition, she feels she processes things more slowly and is more forgetful. She is obviously operating from a very high baseline though as she is a , is still working, and has an organized legal sheet of questions and items to discuss at her visit today. No urinary symptoms. She has been seen by Dr. Alfaro who thinks that her gait and balance issues may be multifactorial with contributions from venous vascular dysfunction as well as hydrocephalus. As such, she is undergoing a PT program and is obtaining venous imaging to discuss with Dr. Alfaro again. Dr. Alfaro notes that she does not meet criteria for CANVAS syndrome but has a family member who does. She did have a high volume LP at another facility without much improvement. Vidya states that it seemed unclear as to what their evaluation method was and so she was looking for another opinion related to NPH.    Medications:   Current Outpatient Medications:     CALCIUM-VITAMIN D PO, Take by mouth daily, Disp: , Rfl:     cholecalciferol 25 MCG (1000 UT) TABS, Take 1,000 Units by mouth, Disp: , Rfl:     cyanocobalamin (VITAMIN B-12) 1000 MCG tablet, , Disp: , Rfl:     ketoconazole (NIZORAL) 2 % external cream, APPLY CREAM TOPICALLY TWICE DAILY TO FEET, Disp: , Rfl:     RISEdronate (ACTONEL) 35 MG tablet, Take 35 mg by mouth, Disp: , Rfl:     Imaging: CT  head shows ventricular enlargement out of proportion to cortical atrophy. Amaro index = 0.33    Prior testing: as above    Exam:  Awake, alert, oriented x 3  Attends  Follows  Fluent  PERRL  EOMI  FS  TML  BUE/BLE 5/5, no drift  Gait with slightly impaired step length, height. Normal base    A/P: 74 year old woman with possible NPH, multifactorial gait/balance disorder. Her symptoms, particularly the feeling of being on a boat, are not classic for NPH. Her imaging indicates that this could be a factor. We discussed that gait typically improves quickly after  shunt, but balance takes longer, up to a year, before it starts to plateau. Given that she has multiple possible factors that could be affecting her balance, she may only get partial improvement, or none at all. Lumbar drain testing is reasonable to proceed with, given the above. Given her cognitive baseline, I think it would be reasonable to proceed with neuropsychological evaluation beforehand.       Today, we discussed the details of a lumbar drain trial. In short, this consists of a 3 day admission in which she will undergo quantitative kinematic, physical therapy and cognitive testing before and after 3 days of CSF drainage via a lumbar drain. After the admission, she will be discharged and see me in clinic the following week. In the interim, I will analyze the data from the admission and then, in discussion with her and her family, we will decide whether or not to proceed with ventriculoperitoneal shunting.      We did discuss the lumbar drain procedure in detail. The risks, benefits and alternative therapies were discussed with the patient, including but not limited to bleeding, infection, CSF leak, headache.  All questions were answered and she expressed understanding.    - Schedule for lumbar drain testing

## 2025-01-13 ENCOUNTER — OFFICE VISIT (OUTPATIENT)
Dept: NEUROPSYCHOLOGY | Facility: CLINIC | Age: 75
End: 2025-01-13
Payer: COMMERCIAL

## 2025-01-13 DIAGNOSIS — F06.8 OTHER SPECIFIED MENTAL DISORDERS DUE TO KNOWN PHYSIOLOGICAL CONDITION: ICD-10-CM

## 2025-01-13 DIAGNOSIS — G91.2 IDIOPATHIC NORMAL PRESSURE HYDROCEPHALUS (INPH) (H): Primary | ICD-10-CM

## 2025-01-13 PROCEDURE — 96138 PSYCL/NRPSYC TECH 1ST: CPT | Performed by: CLINICAL NEUROPSYCHOLOGIST

## 2025-01-13 PROCEDURE — 96139 PSYCL/NRPSYC TST TECH EA: CPT | Performed by: CLINICAL NEUROPSYCHOLOGIST

## 2025-01-13 PROCEDURE — 90791 PSYCH DIAGNOSTIC EVALUATION: CPT | Performed by: CLINICAL NEUROPSYCHOLOGIST

## 2025-01-13 PROCEDURE — 96132 NRPSYC TST EVAL PHYS/QHP 1ST: CPT | Performed by: CLINICAL NEUROPSYCHOLOGIST

## 2025-01-13 PROCEDURE — 96133 NRPSYC TST EVAL PHYS/QHP EA: CPT | Performed by: CLINICAL NEUROPSYCHOLOGIST

## 2025-01-13 NOTE — LETTER
2025       RE: Vidya Brink  [address]  [facility] [address]     Dear Colleague,    Thank you for referring your patient, Vidya Brink, to the [facility] [place] at [facility]. Please see a copy of my visit note below.    The patient was seen for neuropsychological evaluation at the request of Blanche Vega CNP for the purposes of diagnostic clarification and treatment planning. 213 minutes of test administration and scoring were provided by this writer. Please see Dr. Aramis Driver's report for a full interpretation of the findings.    Sharmin Kimball  Psychometrist      NEUROPSYCHOLOGICAL CONSULTATION   NAME: Vidya Brink  [facility] NUMBER: [id number]   : 1950     DOS: 2025    IDENTIFYING INFORMATION AND REASON FOR REFERRAL   Vidya Brink is a 74-year-old, right-handed, , with 19 years of formal education. The patient was referred for a neuropsychological evaluation by Naveen Orona MD, to assess her cognitive and emotional functioning secondary to concerns for possible normal pressure hydrocephalus (NPH). She was unaccompanied to the evaluation. The patient was in-clinic for the entirety of this evaluation. The interview and testing were completed face-to-face.      RELEVANT BACKGROUND INFORMATION AND SUPPORTING DOCUMENTATION  Information in this section comes from the patient and review of her electronic medical record.     Ms. Brink described a history of a fall on 2022. Per her report, she stood and turned quickly, losing her footing in the process. She fell and struck her head on the floor. She denied loss of consciousness or post-concussive symptoms as a result of this injury. Medical records indicate that she was evaluated at urgent care on 1/3/2023, at which time CT imaging of her head detailed ventriculomegaly. She has since been evaluated by Neurology  and Neurosurgery for possible normal pressure hydrocephalus (NPH). Per Dr. Orona's 12/24/2024 neurosurgery note, Ms. Brink has noticed intermittent balance and gait disturbances since her fall in 2022. She has not had urinary symptoms. Regarding cognition, she reported slowed processing speed and slowed recall of names.     Ms. Brink completed formal neuropsychological evaluation under the direction of Dr. Nafisa Maciel at the [facility] on 03/01/2023. Her cognition was within expected limits (see 3/1/2023 neuropsychological report for full details). Medical records indicate that Ms. Brink is scheduled to be admitted to the [facility] to undergo a lumbar drain trial on 1/27/2025.    Presenting Concerns  On interview today, Ms. Brink corroborated the above history available in her medical records. Regarding cognition, consistent with previous reports, she indicated slowed processing speed and slowed name recall. She also described instances of repeating herself in conversation. She reported first noticing these symptoms following her fall in 12/2022 and described their course as stable. Ms. Brink indicated difficulty maintaining focus on tasks during periods of high stress but otherwise denied concerns with attention/concentration. She also denied ongoing cognitive concerns, including in the domains of executive functioning, language, and visuospatial abilities.     Functional Status  ADLs: Independent, with no noted concerns    Household chores/Cooking: Independent, with no noted concerns   Finances: Independent, with no noted concerns  Medications: Independent, with no noted concerns  Driving: Continues to drive; denied recent traffic violations, accidents, or other safety concerns    Health Behaviors   Sleep: 6 hours of cumulative sleep nightly; denied difficulties with sleep initiation or maintenance; denied snoring or gasping arousals; denied  parasomnias  Appetite/Weight: She reported a 25-30 pound weight loss following her fall in 2023, although weight has remained stable since that time. She denied significant changes in appetite.   Exercise: Exercises for 1 hour each day  Pain: Reported hip pain when using the stairs; continues to do physical therapy exercises for pain management     Psychiatric History  Ms. Brink described her current mood as stable, although she noted intermittent anxiety related to ongoing medical stressors.   She reported history of  feeling down  in the winter months but denied history of clinically significant depressed mood.    Psychiatric treatment: Denied history of psychiatric hospitalizations; denied history of engagement with a psychiatrist; completed 2 sessions of psychotherapy related to aforementioned mediccally related anxiety.   She denied behavioral or personality changes.  She denied a history of trauma/abuse, perceptual disturbances (e.g., hallucinations), or disordered thought.  Suicidality/Homicidal ideation: Denied any current or historical suicidal or homicidal ideation, plan, or intent; denied history of suicide attempts     Substance Use History  Alcohol: Denied current or recent use  Nicotine: Denied current or historical use  Cannabis: Denied current or historical use  Illicit Substances: Denied current or historical use   Problematic Substance Use: Denied history of problematic substance use or chemical dependency treatment    Developmental, Educational, & Occupational History  Gestational: Full-term   complications: None reported  Developmental milestones: Met at expected ages  Native Language: English  Education: Denied history of learning disability, repeating a grade, or requiring special education; graduated high school at the top of her class; completed bachelor's degree in history at [employer]; completed law degree (J.D.) at [employer]; completed master's of law degree at  "[employer]   Occupation: Currently working part-time as an  in private practice; previously worked as an  for the [employer]     Psychosocial History  [place] in [place]   Marital:  to spouse for 41 years   Children: 2 children, 2 grandchildren  Housing: Lives in home with spouse     Family History  The patient reported a history of dementia in her mother (onset in her 90s), Parkinson's disease in her father, possible multiple sclerosis in her sister, and possible CANVAS in her brother.      Patient's Active Problem List (per EMR)  Patient Active Problem List   Diagnosis     Dizziness     Vertigo     EPCAM-related James syndrome (HNPCC8)     Additional Medical History (per patient report)  Ms. Brink denied history of stroke, seizure, myocardial infarction, brain tumor, or brain infection.   She reported falling off of a horse as a child and striking her head. However, she denied losing consciousness, having post-concussive symptoms, or experiencing lasting cognitive sequelae from the injury. She denied any additional history of head injuries with loss of consciousness.   Vision: Underwent cataract surgery in 2023; utilizes eyeglasses for distance; reported ongoing monitoring of retinal tear  Hearing: Wears unilateral hearing aid in left ear  She denied changes in her sense of smell or taste.   She reported weakness in her right leg related to history of a pinched nerve. She has seen PT and continues to regularly complete PT exercises.  Ms. Brink described her gait as stiff, wobbly, and jerky. She described variability in her balance from day-to-day but indicated that balance concerns have remained broadly stable since their onset.    She denied tremor, numbness/tingling in her extremities, or problems with incontinence.     Neurodiagnostic Findings (per EMR)  Head CT w/ contrast (5/7/2024): \"IMPRESSION: 1. No narrowing of the internal jugular " "veins in the neutral position. Mild narrowing of the upper left internal jugular vein with neck flexion. This is of doubtful clinical significance and can be seen in the absence of symptoms. 2. Patent dural venous sinuses and major deep intracranial veins. 3. Ventriculomegaly suggestive of normal pressure hydrocephalus, unchanged since 2023.\"  Brain MRI w/o contrast (01/17/2023): 1. Moderate ventriculomegaly particularly of the lateral and third ventricles and a mild degree of periventricular white matter hyperintense signal. While there are no associated imaging findings to suggest a communicating hydrocephalus, in correlation with prior MRI from 2016, these findings are new and could represent normal pressure hydrocephalus with transependymal CSF flow. Alternative consideration would include central cerebral volume loss. 2. Mild-to-moderate chronic small vessel ischemic disease. No acute infarct, hemorrhage, mass or extra-axial fluid collection.   Brain MRI and IAC w/ and w/o contrast (7/20/2016):  Conclusion: 1. Normal internal auditory canal exam. 2. Essentially normal brain exam, with minimal subcortical T2 changes likely reflecting age commensurate small vessel ischemic changes, incidental right frontal developmental venous anomaly (venous angioma), and no evidence of intracranial masses or metastatic disease.      Current Medications (per EMR)  Ms. Brink has a current medication list which includes the following prescription(s): calcium carbonate-vitamin d, cholecalciferol, cyanocobalamin, ketoconazole, and risedronate.    BEHAVIORAL OBSERVATIONS  Vision and hearing (with left hearing aid): Adequate for testing purposes  Attentive and focused while undergoing testing  Appearance: Well-groomed, casually dressed  Gait/Posture: Slowed, stiff gait   Sensorimotor: No abnormalities noted  Behavior: Cooperative, pleasant, no behavioral abnormalities noted  Speech: Fluent, articulate, normal rate, prosody, and " volume; no conversational word finding difficulty  Thought process: Logical, linear, and goal-directed   Thought content: Logical, appropriate  Affect: Broad, responsive, consistent with reported mood; good eye contact  Mood: Euthymic  Insight and Judgment: Intact  Approach to testing: Efficient, deliberate; often self-doubtful of performance; mildly low frustration tolerance on cognitively demanding tasks  Rapport: Easily established and maintained    Task engagement: Good    RESULTS  Performance Validity: Performances on stand-alone and embedded measures of cognitive performance validity were in the valid range. Overall, test results are believed to accurately represent the patient's current neurocognitive status.    Orientation: She was oriented to place, time, and personal information. She was able to name the current [place] President and 4/5 of the most recent past [place] Presidents.    Pre-morbid Ability: Psychometric estimate of the patient's premorbid intellectual functioning based on single word reading ability was in the average range.     Attention/Processing Speed: Immediate auditory attention and working memory tasks were in the average to above average range. A speeded psychomotor decoding task was exceptionally high. A speeded visuomotor sequencing task was average. Speeded word reading and color naming were above average and high average, respectively. Verbal fluency tasks with a processing speed component were in the high average to above average range.     Learning and Memory:  Initial encoding of a word-list over multiple learning trials was average. Recall of the list after a delay was average. Recognition of the word-list was average and was error-free. Initial encoding of contextualized verbal information in the form of short stories was high average, and delayed recall was average. Recognition of story details was high average. Learning of simple geometric figures and their spatial locations was low  average. Delayed recall of these figures was low average. Retention of these figures was within expected limits, recalling 100% of the figure elements that she initially learned. Recognition of these figures was within expected limits and was error-free.     Language: Letter-based verbal fluency was above average. Semantic verbal fluency was high average. Confrontation naming was low average.    Visuospatial Processing: Performance on a spatial perception task requiring judgment of angled lines was average. Copy of a complex figure was within expected limits and evidenced no gross visuospatial distortions in the overall gestalt configuration. Spontaneous production and copy of a clock were within expected limits. On a visuoconstruction task involving reproducing patterns with blocks, performance was average.     Executive Functions: Verbal abstract reasoning performance was exceptionally high. Speeded complex visuomotor sequencing and cognitive flexibility was high average. Speeded response inhibition performance was high average. Performance on a task of speeded inhibition with a cognitive flexibility/set-switching component was average. Semantic verbal fluency with a cognitive flexibility/set-switching component was average. On a complex figure drawing, there was no evidence for planning/organization difficulties. Her drawing of a clock evidenced no executive-based errors.     Psychological and Behavioral: On a self-report questionnaire, she endorsed minimal depressive symptoms, consistent with her reports during clinical interview.     SUMMARY AND DIAGNOSIS:  Ms. Brink demonstrated broadly normal cognition across all domains assessed. Her cognition is broadly average to high average. The exception to this is a single measure of visual memory which represented a mild decline from her previous neuropsychological evaluation in 2023. I suspect that this isolated low score represents statistical noise and is not  reflective of underlying brain dysfunction. Otherwise, compared to her 2023 neuropsychological evaluation, there was no evidence for cognitive decline. She endorsed minimal symptoms of depression. The current findings are not overly consistent with cognitive sequalae resultant from NPH, nor are they suggestive of a progressive neurodegenerative condition. She does not meet criteria for a diagnosis of mild cognitive impairment or dementia at this time.     RECOMMENDATIONS:  It is recommended that the patient stay cognitively, socially, and physically active (as able and in consultation with her medical providers), eat a healthy diet, and continue working closely with her treatment team to monitor and manage her chronic health conditions to promote cognitive health and reduce risk of cognitive decline.  Given ongoing medical stressors, continued monitoring of mood symptoms is warranted. Should she notice a dramatic increase in anxiety levels, a referral for psychotherapeutic intervention may be useful.   If she proceeds with the lumbar drain trial (currently scheduled for admission on 1/27/2025), follow-up neuropsychological evaluation will be completed on 01/30/2025. Should additional NPH intervention take place in the future (e.g.,  shunt placement), neuropsychological re-evaluation may be requested 6-12 months post-procedure to document any changes in cognitive functioning.    The results were discussed with the patient on 1/14/2025 via phone, and her questions were answered. She was encouraged to follow up with her treating healthcare providers to facilitate these recommendations noted in the report.     Alan Funes, Ph.D.   Neuropsychology Fellow     Aramis Driver, Ph.D., L.P., ABPP  Board Certified in Clinical Neuropsychology   / Licensed Psychologist [id number]    Time spent: One unit psychiatric diagnostic interview including interview and clinical assessment by licensed and  board-certified neuropsychologist (CPT 16277). One unit (60 minutes) neuropsychological testing evaluation by licensed and board-certified neuropsychologist, including integration of patient data, interpretation of standardized test results and clinical data, clinical decision-making, treatment planning, and report, first hour (CPT 52628). One unit(s) (35 minutes) of neuropsychological testing evaluation by licensed and board-certified neuropsychologist, including integration of patient data, interpretation of standardized test results and clinical data, clinical decision-making, treatment planning, supervision of fellow, and report, subsequent hours (CPT 60154). One unit (30 minutes) of psychological and neuropsychological test administration and scoring by technician, first 30 minutes (CPT 03058). 6 units (183 minutes) psychological or neuropsychological test administration and scoring by technician, subsequent 30 minutes (CPT 66965). Diagnoses: G91.2, F06.8        Again, thank you for allowing me to participate in the care of your patient.      Sincerely,    Aramis Driver, PhD LP

## 2025-01-13 NOTE — PROGRESS NOTES
The patient was seen for neuropsychological evaluation at the request of Blanche Vega CNP for the purposes of diagnostic clarification and treatment planning. 213 minutes of test administration and scoring were provided by this writer. Please see Dr. Aramis Driver's report for a full interpretation of the findings.    Sharmin Kimball  Psychometrist

## 2025-01-14 NOTE — PROGRESS NOTES
NAME  Vidya Brink     MRN  [id number]      [birth year]     AGE  74     SEX  Female     HANDEDNESS Right     EDUCATION 19     CAMPOS  25     PROVIDER       Apieron  [place]     STATION  OP            ORIENTATION      Time  0     Personal Info.     Place      Presidents             WAIS-IV         RDS: 9             Raw ScS    Similarities  32 16    Block Design 28 10    Digit Span  27 12    Coding  75 16           MARY KAY-O COMPLEX FIGURE       Raw T %ile   Copy  34  >16   Time to Copy 187  >16                       WRAT    5 Blue     SS %ile GE   Reading  109 76 >12.9          DKEFS VERBAL FLUENCY       Raw SS     Letter 52 15     Category 38 12     Switching Total  13 11     Switching Accuracy  12 11            DKEFS COLOR-WORD INTERFERENCE TEST     Raw SS     Color 26 13     Word 17 14     Inhibition 57 13     In/Switching 75 10             [place]ON NAMING TEST     Raw 51 /60     ScS 8      T 42             TRAIL MAKING TEST       Time Errors ScS T   A 36 0 8 48   B 59 0 11 59           KOFI   H Short   Raw 22      %ile 41-59             GDS       Raw 8      Interp. Minimal              WMS-IV LOGICAL MEMORY OA    Raw ScS/%ile     LM I 36 12     LM II 15 10     Recog. 22 >75             HVLT   3     Raw T    Trial 1  6     Trial 2  9     Trial 3  10     Learning  4     Total Recall 25 54    Delayed Recall 8 49    Percent Retention 80% 47    True Positives 12     False Positives 0     Disc. Index  12 56            BVMT   1     Raw T/%ile    Trial 1  4     Trial 2  5     Trial 3  5     Learning  1     Total Recall 14 39    Delayed Recall 5 39    Percent Retention 100% >16    Recognition Hits 6     Recognition F.P 0     Disc. Index  6 >16           CLOCK DRAWING      Command Normal       Copy Normal             DCT       E-Score 8

## 2025-01-14 NOTE — PROGRESS NOTES
NEUROPSYCHOLOGICAL CONSULTATION   NAME: Vidya Brink  [facility] NUMBER: [id number]   : 1950     DOS: 2025    IDENTIFYING INFORMATION AND REASON FOR REFERRAL   Vidya Brink is a 74-year-old, right-handed, , with 19 years of formal education. The patient was referred for a neuropsychological evaluation by Naveen Orona MD, to assess her cognitive and emotional functioning secondary to concerns for possible normal pressure hydrocephalus (NPH). She was unaccompanied to the evaluation. The patient was in-clinic for the entirety of this evaluation. The interview and testing were completed face-to-face.      RELEVANT BACKGROUND INFORMATION AND SUPPORTING DOCUMENTATION  Information in this section comes from the patient and review of her electronic medical record.     Ms. Brink described a history of a fall on 2022. Per her report, she stood and turned quickly, losing her footing in the process. She fell and struck her head on the floor. She denied loss of consciousness or post-concussive symptoms as a result of this injury. Medical records indicate that she was evaluated at urgent care on 1/3/2023, at which time CT imaging of her head detailed ventriculomegaly. She has since been evaluated by Neurology and Neurosurgery for possible normal pressure hydrocephalus (NPH). Per Dr. Orona's 2024 neurosurgery note, Ms. Brink has noticed intermittent balance and gait disturbances since her fall in . She has not had urinary symptoms. Regarding cognition, she reported slowed processing speed and slowed recall of names.     Ms. Brink completed formal neuropsychological evaluation under the direction of Dr. Nafisa Maciel at the [facility] on 2023. Her cognition was within expected limits (see 3/1/2023 neuropsychological report for full details). Medical records indicate that Ms. Brink is scheduled to be admitted to the [facility] to undergo a lumbar drain trial on 1/27/2025.    Presenting Concerns  On interview today, Ms. Brink corroborated the above history available in her medical records. Regarding cognition, consistent with previous reports, she indicated slowed processing speed and slowed name recall. She also described instances of repeating herself in conversation. She reported first noticing these symptoms following her fall in 12/2022 and described their course as stable. Ms. Brink indicated difficulty maintaining focus on tasks during periods of high stress but otherwise denied concerns with attention/concentration. She also denied ongoing cognitive concerns, including in the domains of executive functioning, language, and visuospatial abilities.     Functional Status  ADLs: Independent, with no noted concerns    Household chores/Cooking: Independent, with no noted concerns   Finances: Independent, with no noted concerns  Medications: Independent, with no noted concerns  Driving: Continues to drive; denied recent traffic violations, accidents, or other safety concerns    Health Behaviors   Sleep: 6 hours of cumulative sleep nightly; denied difficulties with sleep initiation or maintenance; denied snoring or gasping arousals; denied parasomnias  Appetite/Weight: She reported a 25-30 pound weight loss following her fall in 12/2023, although weight has remained stable since that time. She denied significant changes in appetite.   Exercise: Exercises for 1 hour each day  Pain: Reported hip pain when using the stairs; continues to do physical therapy exercises for pain management     Psychiatric History  Ms. Brink described her current mood as stable, although she noted intermittent anxiety related to ongoing medical stressors.   She reported history of  feeling down  in the winter months but denied history of clinically significant depressed mood.    Psychiatric treatment: Denied history of psychiatric  hospitalizations; denied history of engagement with a psychiatrist; completed 2 sessions of psychotherapy related to aforementioned mediccally related anxiety.   She denied behavioral or personality changes.  She denied a history of trauma/abuse, perceptual disturbances (e.g., hallucinations), or disordered thought.  Suicidality/Homicidal ideation: Denied any current or historical suicidal or homicidal ideation, plan, or intent; denied history of suicide attempts     Substance Use History  Alcohol: Denied current or recent use  Nicotine: Denied current or historical use  Cannabis: Denied current or historical use  Illicit Substances: Denied current or historical use   Problematic Substance Use: Denied history of problematic substance use or chemical dependency treatment    Developmental, Educational, & Occupational History  Gestational: Full-term   complications: None reported  Developmental milestones: Met at expected ages  Native Language: English  Education: Denied history of learning disability, repeating a grade, or requiring special education; graduated high school at the top of her class; completed bachelor's degree in history at [employer]; completed law degree (J.D.) at [employer]; completed master's of law degree at [employer]   Occupation: Currently working part-time as an  in private practice; previously worked as an  for the [employer]     Psychosocial History  [place] in [place]   Marital:  to spouse for 41 years   Children: 2 children, 2 grandchildren  Housing: Lives in home with spouse     Family History  The patient reported a history of dementia in her mother (onset in her 90s), Parkinson's disease in her father, possible multiple sclerosis in her sister, and possible CANVAS in her brother.      Patient's Active Problem List (per EMR)  Patient Active Problem List   Diagnosis    Dizziness    Vertigo     "EPCAM-related James syndrome (HNPCC8)     Additional Medical History (per patient report)  Ms. Brink denied history of stroke, seizure, myocardial infarction, brain tumor, or brain infection.   She reported falling off of a horse as a child and striking her head. However, she denied losing consciousness, having post-concussive symptoms, or experiencing lasting cognitive sequelae from the injury. She denied any additional history of head injuries with loss of consciousness.   Vision: Underwent cataract surgery in 2023; utilizes eyeglasses for distance; reported ongoing monitoring of retinal tear  Hearing: Wears unilateral hearing aid in left ear  She denied changes in her sense of smell or taste.   She reported weakness in her right leg related to history of a pinched nerve. She has seen PT and continues to regularly complete PT exercises.  Ms. Brink described her gait as stiff, wobbly, and jerky. She described variability in her balance from day-to-day but indicated that balance concerns have remained broadly stable since their onset.    She denied tremor, numbness/tingling in her extremities, or problems with incontinence.     Neurodiagnostic Findings (per EMR)  Head CT w/ contrast (5/7/2024): \"IMPRESSION: 1. No narrowing of the internal jugular veins in the neutral position. Mild narrowing of the upper left internal jugular vein with neck flexion. This is of doubtful clinical significance and can be seen in the absence of symptoms. 2. Patent dural venous sinuses and major deep intracranial veins. 3. Ventriculomegaly suggestive of normal pressure hydrocephalus, unchanged since 2023.\"  Brain MRI w/o contrast (01/17/2023): 1. Moderate ventriculomegaly particularly of the lateral and third ventricles and a mild degree of periventricular white matter hyperintense signal. While there are no associated imaging findings to suggest a communicating hydrocephalus, in correlation with prior MRI from 2016, these findings " are new and could represent normal pressure hydrocephalus with transependymal CSF flow. Alternative consideration would include central cerebral volume loss. 2. Mild-to-moderate chronic small vessel ischemic disease. No acute infarct, hemorrhage, mass or extra-axial fluid collection.   Brain MRI and IAC w/ and w/o contrast (7/20/2016):  Conclusion: 1. Normal internal auditory canal exam. 2. Essentially normal brain exam, with minimal subcortical T2 changes likely reflecting age commensurate small vessel ischemic changes, incidental right frontal developmental venous anomaly (venous angioma), and no evidence of intracranial masses or metastatic disease.      Current Medications (per EMR)  Ms. Brink has a current medication list which includes the following prescription(s): calcium carbonate-vitamin d, cholecalciferol, cyanocobalamin, ketoconazole, and risedronate.    BEHAVIORAL OBSERVATIONS  Vision and hearing (with left hearing aid): Adequate for testing purposes  Attentive and focused while undergoing testing  Appearance: Well-groomed, casually dressed  Gait/Posture: Slowed, stiff gait   Sensorimotor: No abnormalities noted  Behavior: Cooperative, pleasant, no behavioral abnormalities noted  Speech: Fluent, articulate, normal rate, prosody, and volume; no conversational word finding difficulty  Thought process: Logical, linear, and goal-directed   Thought content: Logical, appropriate  Affect: Broad, responsive, consistent with reported mood; good eye contact  Mood: Euthymic  Insight and Judgment: Intact  Approach to testing: Efficient, deliberate; often self-doubtful of performance; mildly low frustration tolerance on cognitively demanding tasks  Rapport: Easily established and maintained    Task engagement: Good    RESULTS  Performance Validity: Performances on stand-alone and embedded measures of cognitive performance validity were in the valid range. Overall, test results are believed to accurately represent  the patient's current neurocognitive status.    Orientation: She was oriented to place, time, and personal information. She was able to name the current [place] President and 4/5 of the most recent past [place] Presidents.    Pre-morbid Ability: Psychometric estimate of the patient's premorbid intellectual functioning based on single word reading ability was in the average range.     Attention/Processing Speed: Immediate auditory attention and working memory tasks were in the average to above average range. A speeded psychomotor decoding task was exceptionally high. A speeded visuomotor sequencing task was average. Speeded word reading and color naming were above average and high average, respectively. Verbal fluency tasks with a processing speed component were in the high average to above average range.     Learning and Memory:  Initial encoding of a word-list over multiple learning trials was average. Recall of the list after a delay was average. Recognition of the word-list was average and was error-free. Initial encoding of contextualized verbal information in the form of short stories was high average, and delayed recall was average. Recognition of story details was high average. Learning of simple geometric figures and their spatial locations was low average. Delayed recall of these figures was low average. Retention of these figures was within expected limits, recalling 100% of the figure elements that she initially learned. Recognition of these figures was within expected limits and was error-free.     Language: Letter-based verbal fluency was above average. Semantic verbal fluency was high average. Confrontation naming was low average.    Visuospatial Processing: Performance on a spatial perception task requiring judgment of angled lines was average. Copy of a complex figure was within expected limits and evidenced no gross visuospatial distortions in the overall gestalt configuration. Spontaneous production and copy  of a clock were within expected limits. On a visuoconstruction task involving reproducing patterns with blocks, performance was average.     Executive Functions: Verbal abstract reasoning performance was exceptionally high. Speeded complex visuomotor sequencing and cognitive flexibility was high average. Speeded response inhibition performance was high average. Performance on a task of speeded inhibition with a cognitive flexibility/set-switching component was average. Semantic verbal fluency with a cognitive flexibility/set-switching component was average. On a complex figure drawing, there was no evidence for planning/organization difficulties. Her drawing of a clock evidenced no executive-based errors.     Psychological and Behavioral: On a self-report questionnaire, she endorsed minimal depressive symptoms, consistent with her reports during clinical interview.     SUMMARY AND DIAGNOSIS:  Ms. Brink demonstrated broadly normal cognition across all domains assessed. Her cognition is broadly average to high average. The exception to this is a single measure of visual memory which represented a mild decline from her previous neuropsychological evaluation in 2023. I suspect that this isolated low score represents statistical noise and is not reflective of underlying brain dysfunction. Otherwise, compared to her 2023 neuropsychological evaluation, there was no evidence for cognitive decline. She endorsed minimal symptoms of depression. The current findings are not overly consistent with cognitive sequalae resultant from NPH, nor are they suggestive of a progressive neurodegenerative condition. She does not meet criteria for a diagnosis of mild cognitive impairment or dementia at this time.     RECOMMENDATIONS:  It is recommended that the patient stay cognitively, socially, and physically active (as able and in consultation with her medical providers), eat a healthy diet, and continue working closely with her  treatment team to monitor and manage her chronic health conditions to promote cognitive health and reduce risk of cognitive decline.  Given ongoing medical stressors, continued monitoring of mood symptoms is warranted. Should she notice a dramatic increase in anxiety levels, a referral for psychotherapeutic intervention may be useful.   If she proceeds with the lumbar drain trial (currently scheduled for admission on 1/27/2025), follow-up neuropsychological evaluation will be completed on 01/30/2025. Should additional NPH intervention take place in the future (e.g.,  shunt placement), neuropsychological re-evaluation may be requested 6-12 months post-procedure to document any changes in cognitive functioning.    The results were discussed with the patient on 1/14/2025 via phone, and her questions were answered. She was encouraged to follow up with her treating healthcare providers to facilitate these recommendations noted in the report.     Alan Funes, Ph.D.   Neuropsychology Fellow     Aramis Driver, Ph.D., L.P., [facility]  Board Certified in Clinical Neuropsychology   / Licensed Psychologist [id number]    Time spent: One unit psychiatric diagnostic interview including interview and clinical assessment by licensed and board-certified neuropsychologist (CPT 90046). One unit (60 minutes) neuropsychological testing evaluation by licensed and board-certified neuropsychologist, including integration of patient data, interpretation of standardized test results and clinical data, clinical decision-making, treatment planning, and report, first hour (CPT 90038). One unit(s) (35 minutes) of neuropsychological testing evaluation by licensed and board-certified neuropsychologist, including integration of patient data, interpretation of standardized test results and clinical data, clinical decision-making, treatment planning, supervision of fellow, and report, subsequent hours (CPT 43645). One unit (30  minutes) of psychological and neuropsychological test administration and scoring by technician, first 30 minutes (CPT 26636). 6 units (183 minutes) psychological or neuropsychological test administration and scoring by technician, subsequent 30 minutes (CPT 49079). Diagnoses: G91.2, F06.8

## 2025-01-27 ENCOUNTER — HOSPITAL ENCOUNTER (INPATIENT)
Facility: CLINIC | Age: 75
LOS: 3 days | Discharge: HOME | End: 2025-01-30
Attending: NEUROLOGICAL SURGERY | Admitting: NEUROLOGICAL SURGERY
Payer: COMMERCIAL

## 2025-01-27 ENCOUNTER — APPOINTMENT (OUTPATIENT)
Dept: PHYSICAL THERAPY | Facility: CLINIC | Age: 75
End: 2025-01-27
Attending: NURSE PRACTITIONER
Payer: COMMERCIAL

## 2025-01-27 DIAGNOSIS — G91.2 NPH (NORMAL PRESSURE HYDROCEPHALUS) (H): Primary | ICD-10-CM

## 2025-01-27 LAB
ANION GAP SERPL CALCULATED.3IONS-SCNC: 8 MMOL/L (ref 7–15)
APTT PPP: 26 SECONDS (ref 22–38)
BUN SERPL-MCNC: 27.1 MG/DL (ref 8–23)
CALCIUM SERPL-MCNC: 9.4 MG/DL (ref 8.8–10.4)
CHLORIDE SERPL-SCNC: 105 MMOL/L (ref 98–107)
CREAT SERPL-MCNC: 0.57 MG/DL (ref 0.51–0.95)
EGFRCR SERPLBLD CKD-EPI 2021: >90 ML/MIN/1.73M2
ERYTHROCYTE [DISTWIDTH] IN BLOOD BY AUTOMATED COUNT: 13.3 % (ref 10–15)
GLUCOSE SERPL-MCNC: 104 MG/DL (ref 70–99)
HCO3 SERPL-SCNC: 26 MMOL/L (ref 22–29)
HCT VFR BLD AUTO: 40.4 % (ref 35–47)
HGB BLD-MCNC: 13 G/DL (ref 11.7–15.7)
INR PPP: 0.99 (ref 0.85–1.15)
MCH RBC QN AUTO: 29.6 PG (ref 26.5–33)
MCHC RBC AUTO-ENTMCNC: 32.2 G/DL (ref 31.5–36.5)
MCV RBC AUTO: 92 FL (ref 78–100)
PLATELET # BLD AUTO: 209 10E3/UL (ref 150–450)
POTASSIUM SERPL-SCNC: 4.1 MMOL/L (ref 3.4–5.3)
RBC # BLD AUTO: 4.39 10E6/UL (ref 3.8–5.2)
SODIUM SERPL-SCNC: 139 MMOL/L (ref 135–145)
WBC # BLD AUTO: 6.1 10E3/UL (ref 4–11)

## 2025-01-27 PROCEDURE — 80051 ELECTROLYTE PANEL: CPT | Performed by: NURSE PRACTITIONER

## 2025-01-27 PROCEDURE — 009U30Z DRAINAGE OF SPINAL CANAL WITH DRAINAGE DEVICE, PERCUTANEOUS APPROACH: ICD-10-PCS | Performed by: NEUROLOGICAL SURGERY

## 2025-01-27 PROCEDURE — 250N000013 HC RX MED GY IP 250 OP 250 PS 637: Performed by: NURSE PRACTITIONER

## 2025-01-27 PROCEDURE — 120N000003 HC R&B IMCU UMMC

## 2025-01-27 PROCEDURE — 97750 PHYSICAL PERFORMANCE TEST: CPT | Mod: GP

## 2025-01-27 PROCEDURE — 85027 COMPLETE CBC AUTOMATED: CPT | Performed by: NURSE PRACTITIONER

## 2025-01-27 PROCEDURE — 85730 THROMBOPLASTIN TIME PARTIAL: CPT | Performed by: NURSE PRACTITIONER

## 2025-01-27 PROCEDURE — 99221 1ST HOSP IP/OBS SF/LOW 40: CPT | Performed by: NURSE PRACTITIONER

## 2025-01-27 PROCEDURE — 36415 COLL VENOUS BLD VENIPUNCTURE: CPT | Performed by: NURSE PRACTITIONER

## 2025-01-27 PROCEDURE — 250N000009 HC RX 250: Performed by: NURSE PRACTITIONER

## 2025-01-27 PROCEDURE — 85610 PROTHROMBIN TIME: CPT | Performed by: NURSE PRACTITIONER

## 2025-01-27 PROCEDURE — 80048 BASIC METABOLIC PNL TOTAL CA: CPT | Performed by: NURSE PRACTITIONER

## 2025-01-27 PROCEDURE — 82374 ASSAY BLOOD CARBON DIOXIDE: CPT | Performed by: NURSE PRACTITIONER

## 2025-01-27 PROCEDURE — 999N000128 HC STATISTIC PERIPHERAL IV START W/O US GUIDANCE

## 2025-01-27 RX ORDER — NALOXONE HYDROCHLORIDE 0.4 MG/ML
0.4 INJECTION, SOLUTION INTRAMUSCULAR; INTRAVENOUS; SUBCUTANEOUS
Status: DISCONTINUED | OUTPATIENT
Start: 2025-01-27 | End: 2025-01-30 | Stop reason: HOSPADM

## 2025-01-27 RX ORDER — VITAMIN B COMPLEX
25 TABLET ORAL DAILY
Status: DISCONTINUED | OUTPATIENT
Start: 2025-01-27 | End: 2025-01-27

## 2025-01-27 RX ORDER — RISEDRONATE SODIUM 35 MG/1
35 TABLET, FILM COATED ORAL
COMMUNITY

## 2025-01-27 RX ORDER — NALOXONE HYDROCHLORIDE 0.4 MG/ML
0.2 INJECTION, SOLUTION INTRAMUSCULAR; INTRAVENOUS; SUBCUTANEOUS
Status: DISCONTINUED | OUTPATIENT
Start: 2025-01-27 | End: 2025-01-30 | Stop reason: HOSPADM

## 2025-01-27 RX ORDER — ACETAMINOPHEN 325 MG/1
650 TABLET ORAL EVERY 4 HOURS PRN
Status: DISCONTINUED | OUTPATIENT
Start: 2025-01-27 | End: 2025-01-28

## 2025-01-27 RX ORDER — LIDOCAINE HYDROCHLORIDE 10 MG/ML
20 INJECTION, SOLUTION EPIDURAL; INFILTRATION; INTRACAUDAL; PERINEURAL ONCE
Status: COMPLETED | OUTPATIENT
Start: 2025-01-27 | End: 2025-01-27

## 2025-01-27 RX ORDER — LANOLIN ALCOHOL/MO/W.PET/CERES
1000 CREAM (GRAM) TOPICAL DAILY
Status: DISCONTINUED | OUTPATIENT
Start: 2025-01-27 | End: 2025-01-30 | Stop reason: HOSPADM

## 2025-01-27 RX ORDER — HYDROMORPHONE HCL IN WATER/PF 6 MG/30 ML
0.2 PATIENT CONTROLLED ANALGESIA SYRINGE INTRAVENOUS
Status: DISCONTINUED | OUTPATIENT
Start: 2025-01-27 | End: 2025-01-28

## 2025-01-27 RX ADMIN — LIDOCAINE HYDROCHLORIDE 20 ML: 10 INJECTION, SOLUTION EPIDURAL; INFILTRATION; INTRACAUDAL; PERINEURAL at 17:18

## 2025-01-27 RX ADMIN — Medication 25 MCG: at 13:53

## 2025-01-27 RX ADMIN — CALCIUM CARBONATE-VITAMIN D TAB 500 MG-200 UNIT 1 TABLET: 500-200 TAB at 14:52

## 2025-01-27 RX ADMIN — Medication 1000 MCG: at 13:53

## 2025-01-27 ASSESSMENT — ACTIVITIES OF DAILY LIVING (ADL)
ADLS_ACUITY_SCORE: 41
ADLS_ACUITY_SCORE: 21
ADLS_ACUITY_SCORE: 41

## 2025-01-27 NOTE — PLAN OF CARE
[id number]  Pt here from home for NPH trial, vss, neuros include: [place] in L ear, wears hearing aide, generalized weakness but 5/5. PIV SL, regular diet, voids spont, LBM 1/26, up ad demetrio until LD is placed. Pt educated on safety features in room and what her stay here will be like, she voiced concern over the bathroom. She voiced that she

## 2025-01-27 NOTE — PHARMACY-ADMISSION MEDICATION HISTORY
Pharmacist Admission Medication History    Admission medication history is complete. The information provided in this note is only as accurate as the sources available at the time of the update.    Information Source(s): Patient via phone    Pertinent Information: Takes Risedronate on Sundays    Changes made to PTA medication list:  Added: None  Deleted: None  Changed: None    Allergies reviewed with patient and updates made in EHR: yes    Medication History Completed By: Stevan Valera RPH 1/27/2025 3:49 PM    PTA Med List   Medication Sig Last Dose/Taking    CALCIUM-VITAMIN D PO Take 600 mg by mouth daily. Past Week    cholecalciferol (VITAMIN D3) 10 mcg (400 units) TABS tablet Take 20 mcg by mouth daily. Past Week    cyanocobalamin (VITAMIN B-12) 1000 MCG tablet  Past Week    ketoconazole (NIZORAL) 2 % external cream APPLY CREAM TOPICALLY TWICE DAILY TO FEET More than a month    RISEdronate (ACTONEL) 35 MG tablet Take 35 mg by mouth every 7 days. Takes on Sundays 1/19/2025

## 2025-01-27 NOTE — H&P
[facility]       NEUROSURGERY H&P NOTE                    HPI:  Vidya Birnk is a 75 y/o woman with possible NPH presents for lumbar drain placement and NPH trial. She had a fall two years ago and has been having gait and balance issues since that time. She feels quite unsteady when she walks, not vertigo per se or having the room spin, but more like she's on the deck of a boat in the water. She feels her unsteadiness has been progressively worsening over the last 6 months to a year. She is able to ambulate on her own and has not had any falls since her original fall two years ago. In terms of cognition, she feels she processes things more slowly and is more forgetful. She is obviously operating from a very high baseline though as she is a .  No urinary symptoms. She has been seen by Dr. Alfaro who thinks that her gait and balance issues may be multifactorial with contributions from venous vascular dysfunction as well as hydrocephalus. As such, she has been undergoing a PT program and is obtaining venous imaging to discuss with Dr. Alfaro again. Dr. Alfaro notes that she does not meet criteria for CANVAS syndrome but has a family member who does. She did have a high volume LP at another facility without much improvement. Vidya states that it seemed unclear as to what their evaluation method was and so she was looking for another opinion related to NPH.  Patient does not taking antiplatelet or anticoagulant medications.     PAST MEDICAL HISTORY:   Past Medical History:   Diagnosis Date    Arthritis     Colon cancer (H) 1989, 1990    Fibrocystic breast     James syndrome     Pneumonia        PAST SURGICAL HISTORY:   Past Surgical History:   Procedure Laterality Date    ARTHROSCOPY SHOULDER ROTATOR CUFF REPAIR Right     COLON SURGERY      subtotal colectomy    HYSTERECTOMY      LAPAROSCOPIC HERNIORRHAPHY INGUINAL Left 09/17/2020    Procedure: LAPAROSCOPIC LEFT INGUINAL  "HERNIA REPAIR, POSSIBLE OPEN;  Surgeon: Donny Carrillo MD;  Location: [facility];  Service: General    PELVIC LAPAROSCOPY      endometriosis    SALPINGOOPHORECTOMY         FAMILY HISTORY:   Family History   Problem Relation Age of Onset    Ataxia Sister         \"Balance problems\"    Ataxia Brother         \"Balance problems\"    Breast Cancer Paternal Grandmother 48    James Syndrome Son         s/p Aleksandr       SOCIAL HISTORY:   Social History     Tobacco Use    Smoking status: Never    Smokeless tobacco: Never   Substance Use Topics    Alcohol use: Not on file       MEDICATIONS:  No current outpatient medications on file.       Allergies:  Allergies   Allergen Reactions    Fosamax [Alendronate]        ROS: 10 point ROS of systems including Constitutional, Eyes, Respiratory, Cardiovascular, Gastroenterology, Genitourinary, Integumentary, Muscularskeletal, Psychiatric were all negative except for pertinent positives noted in my HPI.    Physical exam:   not currently breastfeeding.  General: awake and alert  HEENT: atraumatic   PULM: breathing comfortably on room air  MSK: normal bulk and tone  NEUROLOGIC:  -- Awake; Alert; oriented x 3  -- Follows commands briskly  -- +repetition, calculation, and naming  -- Speech fluent, spontaneous. No aphasia or dysarthria.  -- no gaze preference. No apparent hemineglect.  Cranial Nerves:  -- visual fields full to confrontation, PERRL 3-2mm bilat and brisk, extraocular movements intact  -- face symmetrical, tongue midline  -- sensory V1-V3 intact bilaterally  -- palate elevates symmetrically, uvula midline  -- hearing grossly intact bilat  -- Trapezii 5/5 strength bilat symmetric  -- Cerebellar: Finger nose finger without dysmetria, intact rapid alternating motions bilaterally    Motor:  Normal bulk / tone; no tremor, rigidity, or bradykinesia.  No muscle wasting or fasciculations  No Pronator Drift     Delt Bi Tri Hand Flexion/  Extension Iliopsoas Quadriceps " "Hamstrings Tibialis Anterior Gastroc    C5 C6 C7 C8/T1 L2 L3 L4-S1 L4 S1   R 5 5 5 5 5 5 5 5 5   L 5 5 5 5 5 5 5 5 5     Sensory:  intact to LT x 4 extremities       Reflexes: no clonus       Bi Tri BR Raghu Pat Ach Bab     C5-6 C7-8 C6 UMN L2-4 S1 UMN   R 2+ 2+ 2+ Norm 2+ 2+ Norm   L 2+ 2+ 2+ Norm 2+ 2+ Norm                  LABS:   Last Comprehensive Metabolic Panel:  Lab Results   Component Value Date     09/11/2020    POTASSIUM 4.0 09/11/2020    CHLORIDE 104 09/11/2020    CO2 27 09/11/2020    ANIONGAP 9 09/11/2020    GLC 81 09/11/2020    BUN 11 09/11/2020    CR 0.68 09/11/2020    GFRESTIMATED >60 09/11/2020    YOSI 9.3 09/11/2020         Lab Results   Component Value Date    WBC 4.5 09/11/2020     Lab Results   Component Value Date    RBC 4.30 09/11/2020     Lab Results   Component Value Date    HGB 12.7 09/11/2020     Lab Results   Component Value Date    HCT 38.3 09/11/2020     Lab Results   Component Value Date    MCV 89 09/11/2020     Lab Results   Component Value Date    MCH 29.5 09/11/2020     Lab Results   Component Value Date    MCHC 33.1 09/11/2020     Lab Results   Component Value Date    RDW 13.1 09/11/2020     Lab Results   Component Value Date     09/11/2020     No results found for: \"INR\"   No results found for: \"PTT\"        ASSESSMENT:  74 year old female with 2 year history of gait imbalance with concern of NPH presenting for lumbar drain trial       PLAN:  Admit to 6A  PT evaluation pre-drain placement and on Thursday, January 1/30/2025  Neuropsych testing on 1/30/2025  Regular diet  Up with assistance  Once drain is in place, will drain 10mL/hour   Notify Neurosurgery if patient develops severe headache and with drain issues       SHANNAN Thomas, CNP  Department of Neurosurgery  Pager: [phone]      The patient was discussed with Dr. Clark Nguyen, neurosurgery chief resident, and Dr. Naveen Orona, neurosurgery staff.   "

## 2025-01-27 NOTE — PROCEDURES
[facility], [place]  Neurosurgery Procedure Note    Name of Procedure: Lumbar Drain Placement    Date of Procedure: January 27, 2025    Description of Procedure  Obtained consent for the procedure was obtained from the patient after its risks and benefits were thoroughly explained. The patient was then placed in the left lateral decubitus position. Local anesthesia was administered. The puncture site was then prepped and draped in the usual sterile fashion. A large-bore Tuohy spinal needle was then advanced through the patient's skin and subcutaneous tissue into what was believed to be the L4-L5 interspinous space until CSF was obtained. The CSF came out in a steady flow. The lumbar drain catheter was then advanced through the spinal needle and into the the patient's intrathecal space. The spinal needle was then gently removed. The lumbar drain was secured to the patient's skin via 3 points of fixation and connected to a Cardozo drain. The lumbar drain tubing was then covered with Tegaderm and pressure dressing. CSF continued to flow at the end of the procedure. The lumbar drain was left clamped. There were not any immediate complications.    Dr. Nguyen, Neurosurgery chief, was present for the entire procedure.    Oswald Perez MD  Neurosurgery Resident  Pager [phone]     Please contact neurosurgery resident on call with questions.    Dial * * [phone], enter [phone] when prompted.

## 2025-01-27 NOTE — PLAN OF CARE
Normal Pressure Hydrocephalus - Physical Therapy Assessment    Date:  01/27/2025    1. Mini Best Test:  The Mini-BESTest assesses reactive postural, anticipatory, sensory orientation, and dynamic gait balance.  Gait assistive device used: none     Patient Score Pre-drain: 23/28  Patient Score Day 3:    Scores of <17.5/28 have identified people with chronic stroke that have a history of falling according to Franco et al 2013.   Scores of <22/28 are predictive of increased fall risk for persons with Parkinson's Disease according to Sabas FAN et al 2020.    Minimally Detectable Change (MDC) for persons with Parkinson's Disease 5.52pts per Brittany et al 2011.  Minimally Clinically Significant Difference (MCID) for persons with Balance Disorders: 4pts according to Dalila et al 2013.    2. Additional Functional Measure: 10-Meter Walk Test    Pre-drain: Comfortable pace -  0.92 m/s          Fast Pace - 1.2 m/s  Day 3:     3. Additional Functional Measure: 2-Minute Walk Test   Pre-drain: 765ft  Day 3:     Minutes billed as a Physical Performance test: 39 mins

## 2025-01-28 PROCEDURE — 250N000013 HC RX MED GY IP 250 OP 250 PS 637: Performed by: NURSE PRACTITIONER

## 2025-01-28 PROCEDURE — 250N000013 HC RX MED GY IP 250 OP 250 PS 637

## 2025-01-28 PROCEDURE — 120N000003 HC R&B IMCU UMMC

## 2025-01-28 PROCEDURE — 99231 SBSQ HOSP IP/OBS SF/LOW 25: CPT | Performed by: NURSE PRACTITIONER

## 2025-01-28 PROCEDURE — 250N000011 HC RX IP 250 OP 636: Performed by: NURSE PRACTITIONER

## 2025-01-28 RX ORDER — METOCLOPRAMIDE 10 MG/1
10 TABLET ORAL EVERY 6 HOURS PRN
Status: DISCONTINUED | OUTPATIENT
Start: 2025-01-28 | End: 2025-01-30 | Stop reason: HOSPADM

## 2025-01-28 RX ORDER — METHOCARBAMOL 500 MG/1
500 TABLET, FILM COATED ORAL 4 TIMES DAILY
Status: DISCONTINUED | OUTPATIENT
Start: 2025-01-28 | End: 2025-01-28

## 2025-01-28 RX ORDER — ONDANSETRON 2 MG/ML
4 INJECTION INTRAMUSCULAR; INTRAVENOUS EVERY 6 HOURS PRN
Status: DISCONTINUED | OUTPATIENT
Start: 2025-01-28 | End: 2025-01-30 | Stop reason: HOSPADM

## 2025-01-28 RX ORDER — ACETAMINOPHEN 325 MG/1
325 TABLET ORAL EVERY 4 HOURS PRN
Status: DISCONTINUED | OUTPATIENT
Start: 2025-01-28 | End: 2025-01-30 | Stop reason: HOSPADM

## 2025-01-28 RX ORDER — OXYCODONE HYDROCHLORIDE 5 MG/1
5 TABLET ORAL EVERY 4 HOURS PRN
Status: DISCONTINUED | OUTPATIENT
Start: 2025-01-28 | End: 2025-01-30 | Stop reason: HOSPADM

## 2025-01-28 RX ORDER — PROCHLORPERAZINE MALEATE 5 MG/1
5 TABLET ORAL EVERY 6 HOURS PRN
Status: DISCONTINUED | OUTPATIENT
Start: 2025-01-28 | End: 2025-01-30 | Stop reason: HOSPADM

## 2025-01-28 RX ORDER — METOCLOPRAMIDE HYDROCHLORIDE 5 MG/ML
10 INJECTION INTRAMUSCULAR; INTRAVENOUS EVERY 6 HOURS PRN
Status: DISCONTINUED | OUTPATIENT
Start: 2025-01-28 | End: 2025-01-30 | Stop reason: HOSPADM

## 2025-01-28 RX ORDER — ONDANSETRON 4 MG/1
4 TABLET, ORALLY DISINTEGRATING ORAL EVERY 6 HOURS PRN
Status: DISCONTINUED | OUTPATIENT
Start: 2025-01-28 | End: 2025-01-30 | Stop reason: HOSPADM

## 2025-01-28 RX ORDER — METHOCARBAMOL 500 MG/1
500 TABLET, FILM COATED ORAL 4 TIMES DAILY PRN
Status: DISCONTINUED | OUTPATIENT
Start: 2025-01-28 | End: 2025-01-30 | Stop reason: HOSPADM

## 2025-01-28 RX ADMIN — ACETAMINOPHEN 325 MG: 325 TABLET, FILM COATED ORAL at 15:49

## 2025-01-28 RX ADMIN — ONDANSETRON 4 MG: 4 TABLET, ORALLY DISINTEGRATING ORAL at 13:19

## 2025-01-28 RX ADMIN — ACETAMINOPHEN 325 MG: 325 TABLET, FILM COATED ORAL at 21:07

## 2025-01-28 RX ADMIN — PSYLLIUM HUSK 1 PACKET: 3.4 POWDER ORAL at 08:45

## 2025-01-28 RX ADMIN — ACETAMINOPHEN 325 MG: 325 TABLET, FILM COATED ORAL at 08:45

## 2025-01-28 RX ADMIN — METHOCARBAMOL 500 MG: 500 TABLET ORAL at 13:19

## 2025-01-28 RX ADMIN — Medication 1000 MCG: at 08:45

## 2025-01-28 ASSESSMENT — ACTIVITIES OF DAILY LIVING (ADL)
ADLS_ACUITY_SCORE: 39
ADLS_ACUITY_SCORE: 39
ADLS_ACUITY_SCORE: 41
ADLS_ACUITY_SCORE: 41
ADLS_ACUITY_SCORE: 39
ADLS_ACUITY_SCORE: 41
ADLS_ACUITY_SCORE: 39
ADLS_ACUITY_SCORE: 39
ADLS_ACUITY_SCORE: 41
ADLS_ACUITY_SCORE: 39
ADLS_ACUITY_SCORE: 41
ADLS_ACUITY_SCORE: 39
ADLS_ACUITY_SCORE: 39

## 2025-01-28 NOTE — PLAN OF CARE
Status: Patient admitted 1/27 for LD placement and NPH trial. Patient had a fall two years ago and has been having gait and balance issues   Vitals: VSS on RA  Neuros: A/O x4. 5/5 t/o. L hearing diminished - hearing aid in place. Gait imbalance. Denies salty/metallic taste  IV: PIV SL  Resp: LSC t/o  Diet: Regular  GI: LBM 1/27. Passing gas  : Voiding spontaneously to bathroom  Skin: LD dressing CDI. R hammer toe  Pain: Pt reported 4/10 neck/head pain, refused Tylenol  Activity: SBA, GB  Plan: LD drain 15 mL q 2 hr  Education completed: Importance of reporting salty/metallic taste    Goal Outcome Evaluation:      Plan of Care Reviewed With: patient    Overall Patient Progress: no change    Outcome Evaluation: LD draining 15 mL Q 2 hr, denies salty/metallic taste

## 2025-01-28 NOTE — PROGRESS NOTES
[facility], [place]   Neurosurgery Progress Note:    Date of service: 1/28/2025    Assessment: Vidya Brink is a 74 year old female  with history of gait imbalance and concern for NPH, now s/p Lumbar drain placement on 1/27/2025     Clinically Significant Risk Factors Present on Admission                                    Plan:  - Neuro checks/vital signs: Q4 hours   - SBP: <160  - Pain control: PRN tylenol, oxycodone   - Activity: Up with assist   - Diet: Regular   - Drain: Lumbar drain in place, drain 15 mL every 2 hours   - DVT prophylaxis: SCDs  - PT evaluation on 1/20/2025        Naz Guaman, APRN, CNP  1/28/2025  Department of Neurosurgery  Pager: [phone]    I have spent a total of 25 minutes total time counseling, coordination, and chart review, over 50% of the time was spent in direct patient care.       Interval History:  Complains of neck pain, no headaches.  Had initial evaluations completed yesterday.  Lumbar drain successfully placed at bedside.            Objective:   Temp:  [97.7  F (36.5  C)-98.3  F (36.8  C)] 98.3  F (36.8  C)  Pulse:  [70-74] 74  Resp:  [14-16] 16  BP: ()/(60-79) 113/76  SpO2:  [94 %-100 %] 98 %  I/O last 3 completed shifts:  In: -   Out: 90 [Drains:90]    Gen: Appears comfortable, NAD  Neurologic:  - Alert & Oriented to person, place, time, and situation  - Follows commands briskly  - Speech fluent, spontaneous. No aphasia or dysarthria.  - No gaze preference. No apparent hemineglect.  - PERRL, EOMI  - Strong eye closure, jaw clench, and cheek puff  - Face symmetric with sensation intact to light touch  - Palate elevates symmetrically, uvula midline, tongue protrudes midline  - Trapezii and sternocleidomastoid muscles 5/5 bilaterally  - No pronator drift     Del Tr Bi WE WF Gr   R 5 5 5 5 5 5   L 5 5 5 5 5 5    HF KE KF DF PF EHL   R 5 5 5 5 5 5   L 5 5 5 5 5 5     Reflexes 2+ throughout    Sensation intact and symmetric to  light touch throughout    LABS  Recent Labs   Lab Test 01/27/25  1233 09/11/20  1550 07/09/20  1835   WBC 6.1 4.5 6.9   HGB 13.0 12.7 12.5   MCV 92 89 87    167 153       Recent Labs   Lab Test 01/27/25  1233 09/11/20  1550 07/02/19  1413 01/29/19  0929    140  --  143   POTASSIUM 4.1 4.0  --  3.5   CHLORIDE 105 104  --  106   CO2 26 27  --  27   BUN 27.1* 11  --  18   CR 0.57 0.68 0.67 0.64   ANIONGAP 8 9  --  10   YOSI 9.4 9.3 9.5 9.4   * 81  --  79       IMAGING    No results found for this or any previous visit (from the past 24 hours).

## 2025-01-28 NOTE — PLAN OF CARE
Arrived from: Home   Belongings/meds:   2 RN Skin Assessment Completed by: Kavon Lundy  Skin Findings:  Non-intact findings documented (yes/no/NA): LD site dressing DCI, right hammer toe     Status: Patient admitted 1/27 for lumbar drain placement and NPH trial. Patient had a fall two years ago and has been having gait and balance issues   Vitals: VSS on RA   Neuros: A&Ox4, left hearing diminished-hearing aid in place, gait imbalance, strength 5/5 throughout. Patient denies Salty and metallic taste, denies N/T   IV: PIV   Resp: WNL  Diet: Regular diet   GI: LBM 1/27   : Voiding spont  Skin: LD site dressing DCI, right hammer toe   Pain: Pt reported moderate headache but refused pain meds     Activity: SBA/GB   Social: Daughter visited-supportive   Plan: Plan to drain LD 15 ml Q2hr, continue to monitor and follow POC     Updates this shift: Patient reported neck stiffness and intermittent headache, MD was notified came and assessed at bedside, MD not concerned at this time, will continue to monitor       Goal Outcome Evaluation:      Plan of Care Reviewed With: patient    Overall Patient Progress: no changeOverall Patient Progress: no change    Outcome Evaluation: LD placed, drain 15 ml Q2hrs per order, denies S/S of CSF leak

## 2025-01-28 NOTE — PROVIDER NOTIFICATION
Provider Notified: Nahun Palomo  Concern: Pt reports brief metallic scent when draining LD, as well as stiff neck and headache  Reponse summary: MD aware  Time Notified: 0652  Time of Response: 0653

## 2025-01-29 PROCEDURE — 120N000003 HC R&B IMCU UMMC

## 2025-01-29 PROCEDURE — 250N000011 HC RX IP 250 OP 636: Performed by: NURSE PRACTITIONER

## 2025-01-29 PROCEDURE — 250N000013 HC RX MED GY IP 250 OP 250 PS 637

## 2025-01-29 PROCEDURE — 250N000013 HC RX MED GY IP 250 OP 250 PS 637: Performed by: NURSE PRACTITIONER

## 2025-01-29 RX ADMIN — Medication 1000 MCG: at 08:58

## 2025-01-29 RX ADMIN — METHOCARBAMOL 500 MG: 500 TABLET ORAL at 11:11

## 2025-01-29 RX ADMIN — ONDANSETRON 4 MG: 4 TABLET, ORALLY DISINTEGRATING ORAL at 15:26

## 2025-01-29 RX ADMIN — ACETAMINOPHEN 325 MG: 325 TABLET, FILM COATED ORAL at 19:05

## 2025-01-29 RX ADMIN — OXYCODONE HYDROCHLORIDE 5 MG: 5 TABLET ORAL at 11:11

## 2025-01-29 RX ADMIN — ACETAMINOPHEN 325 MG: 325 TABLET, FILM COATED ORAL at 15:26

## 2025-01-29 RX ADMIN — CALCIUM CARBONATE-VITAMIN D TAB 500 MG-200 UNIT 1 TABLET: 500-200 TAB at 13:06

## 2025-01-29 RX ADMIN — ONDANSETRON 4 MG: 4 TABLET, ORALLY DISINTEGRATING ORAL at 09:12

## 2025-01-29 RX ADMIN — ACETAMINOPHEN 325 MG: 325 TABLET, FILM COATED ORAL at 09:12

## 2025-01-29 RX ADMIN — PSYLLIUM HUSK 1 PACKET: 3.4 POWDER ORAL at 08:58

## 2025-01-29 RX ADMIN — ONDANSETRON 4 MG: 4 TABLET, ORALLY DISINTEGRATING ORAL at 21:19

## 2025-01-29 ASSESSMENT — ACTIVITIES OF DAILY LIVING (ADL)
ADLS_ACUITY_SCORE: 43
ADLS_ACUITY_SCORE: 39
ADLS_ACUITY_SCORE: 43
ADLS_ACUITY_SCORE: 39
ADLS_ACUITY_SCORE: 41
ADLS_ACUITY_SCORE: 43
ADLS_ACUITY_SCORE: 39
ADLS_ACUITY_SCORE: 43
ADLS_ACUITY_SCORE: 39
ADLS_ACUITY_SCORE: 43

## 2025-01-29 NOTE — PROGRESS NOTES
[facility], [place]   Neurosurgery Progress Note:    Date of service: 1/28/2025    Assessment: Vidya Brink is a 74 year old female  with history of gait imbalance and concern for NPH, now s/p Lumbar drain placement on 1/27/2025     Clinically Significant Risk Factors Present on Admission                                     Plan:  - Neuro checks/vital signs: Q4 hours   - SBP: <160  - Pain control: PRN tylenol, oxycodone   - Activity: Up with assist   - Diet: Regular   - Drain: Lumbar drain in place, drain 10 mL every 2 hours   - DVT prophylaxis: SCDs  - PT, neuropsych evaluation on 1/30/2025    Liza Bui MD  Neurosurgery PGY-1  Pager #[phone]  Please page/[facility] on-call neurosurgery with questions      Interval History:  Drainage decreased yesterday to 10cc every 2 hr due to intermittent neck pain. Pain currently resolved.        Objective:   Temp:  [97.8  F (36.6  C)-98.2  F (36.8  C)] 97.9  F (36.6  C)  Pulse:  [64-66] 64  Resp:  [16-18] 16  BP: (100-117)/(59-72) 110/65  SpO2:  [97 %-100 %] 97 %  I/O last 3 completed shifts:  In: 480 [P.O.:480]  Out: 125 [Drains:125]    Gen: Appears comfortable, NAD  Neurologic:  - Alert & Oriented to person, place, time, and situation  - Follows commands briskly  - Speech fluent, spontaneous. No aphasia or dysarthria.  - No gaze preference. No apparent hemineglect.  - PERRL, EOMI  - Strong eye closure, jaw clench, and cheek puff  - Face symmetric with sensation intact to light touch  - Palate elevates symmetrically, uvula midline, tongue protrudes midline  - Trapezii and sternocleidomastoid muscles 5/5 bilaterally  - No pronator drift     Del Tr Bi WE WF Gr   R 5 5 5 5 5 5   L 5 5 5 5 5 5    HF KE KF DF PF EHL   R 5 5 5 5 5 5   L 5 5 5 5 5 5     Reflexes 2+ throughout    Sensation intact and symmetric to light touch throughout    LABS  Recent Labs   Lab Test 01/27/25  1233 09/11/20  1550 07/09/20  1835   WBC 6.1 4.5 6.9   HGB 13.0 12.7 12.5    MCV 92 89 87    167 153       Recent Labs   Lab Test 01/27/25  1233 09/11/20  1550 07/02/19  1413 01/29/19  0929    140  --  143   POTASSIUM 4.1 4.0  --  3.5   CHLORIDE 105 104  --  106   CO2 26 27  --  27   BUN 27.1* 11  --  18   CR 0.57 0.68 0.67 0.64   ANIONGAP 8 9  --  10   YOSI 9.4 9.3 9.5 9.4   * 81  --  79       IMAGING    No results found for this or any previous visit (from the past 24 hours).

## 2025-01-29 NOTE — PLAN OF CARE
"Goal Outcome Evaluation:    Status: Patient admitted 1/27 for LD placement and NPH trial. Patient had a fall two years ago and has been having gait and balance issues   Vitals: VSS on RA  Neuros: A/O x4. 5/5 t/o. L hearing diminished - hearing aid in place. Gait imbalance per report, but not noticeable today. Denies salty/metallic taste. Pt noted to \"lose her train of thought\" several times throughout the day.  IV: PIV SL  Resp: Lungs clear  Diet: Regular diet, Ate well for breakfast, but was nauseated for lunch. Zofran po given x1 and was effective. Currently having a late dinner.  GI: LBM 1/27.   : Voiding   Skin: LD dressing CDI. R hammer toe  Pain: Pt reported neck/head pain 2/10. Increased to 6-7/10 when LD emptied earlier today. Pt placed in flat position from approx [id number] per NS request. This was helpful in decreasing pain. Pt needs reminders to change positions slowly.Team aware and new order for Robaxin given. Tylenol and heat packs to neck also helpful.  Activity: SBA, GB. Sat in chair and walked unit with SBA today.  Plan: LD drain changed from 15ml to 10ml Q 2 hours this shift.           "

## 2025-01-29 NOTE — PLAN OF CARE
Goal Outcome Evaluation:      Plan of Care Reviewed With: patient, family    Overall Patient Progress: no changeOverall Patient Progress: no change    Outcome Evaluation: LD draining 10mL q2hr. PRN tylenol for HA. patient stating constant neck stiffness that worsens after draining and decreased appetite with intermittent nausea, MD aware.    [id number]  Status: Patient admitted 1/27 for LD placement and NPH trial. Patient had a fall two years ago and has been having gait and balance issues.  Vitals: VSS on RA.   Neuros: A&Ox4, but forgetful at times. L [place], has hearing aid. Generalized weakness. Unsteady gait at times.  IV: PIV SL  Resp/trach: on RA. Denies SOB.  Diet: regular diet, fair PO. Per pt, appetite decreasing since drain put in. Intermittent nausea, denied this shift.   Bowel status: BS+, no BM this shift. LBM 1/27.   : VDSP  Skin: LD site, CDI, draining 10mL q2hr. R hammer toe.   Pain: constant mild HA and neck stiffness partially managed with PRN tylenol x1. Per pt tylenol keeps them up at night.   Activity: SBA/GB. Unsteady gait at times.   Social: patient daughter here in evening, supportive.  Plan: Continue to monitor and follow POC. PT to eval on 1/30. Next drain to be done @ 0100. To give robaxin @ next drain if neck stiffness still present.

## 2025-01-29 NOTE — PLAN OF CARE
Status: Patient admitted 1/27 for LD placement and NPH trial. Patient had a fall two years ago and has been having gait and balance issues.   Vitals: VSS on RA  Neuros: A/O x4. 5/5 t/o. L [place] - hearing aid. Forgetful at times. Gen weakness. Intermittently nauseous, none reported this shift. Denies salty/metallic taste  IV: PIV SL  Resp: LSC t/o  Diet: Regular. Pt reports decreased appetite since LD placement  GI: LBM 1/27. Not passing gas  : Voiding spontaneously to bathroom  Skin: LD site CDI. Draining 10 mL Q 2 hr  Pain: 2/10 neck stiffness/headache, tends to worsen with LD draining. Refused pain meds  Activity: SBA GB. Unsteady gait at times  Plan: PT to evaluate 1/30. Administer Robaxin with LD draining if neck stiffness present    Goal Outcome Evaluation:      Plan of Care Reviewed With: patient    Overall Patient Progress: no change    Outcome Evaluation: LD draining 10 mL Q 2 hr

## 2025-01-30 ENCOUNTER — APPOINTMENT (OUTPATIENT)
Dept: PHYSICAL THERAPY | Facility: CLINIC | Age: 75
End: 2025-01-30
Attending: NEUROLOGICAL SURGERY
Payer: COMMERCIAL

## 2025-01-30 VITALS
HEART RATE: 68 BPM | DIASTOLIC BLOOD PRESSURE: 68 MMHG | RESPIRATION RATE: 16 BRPM | OXYGEN SATURATION: 100 % | SYSTOLIC BLOOD PRESSURE: 107 MMHG | TEMPERATURE: 98.1 F

## 2025-01-30 PROCEDURE — 250N000011 HC RX IP 250 OP 636: Performed by: NURSE PRACTITIONER

## 2025-01-30 PROCEDURE — 97750 PHYSICAL PERFORMANCE TEST: CPT | Mod: GP

## 2025-01-30 PROCEDURE — 250N000011 HC RX IP 250 OP 636

## 2025-01-30 PROCEDURE — 250N000013 HC RX MED GY IP 250 OP 250 PS 637: Performed by: NURSE PRACTITIONER

## 2025-01-30 PROCEDURE — 00PUX0Z REMOVAL OF DRAINAGE DEVICE FROM SPINAL CANAL, EXTERNAL APPROACH: ICD-10-PCS | Performed by: NEUROLOGICAL SURGERY

## 2025-01-30 PROCEDURE — 250N000013 HC RX MED GY IP 250 OP 250 PS 637

## 2025-01-30 RX ORDER — METHOCARBAMOL 500 MG/1
500 TABLET, FILM COATED ORAL 4 TIMES DAILY PRN
Qty: 10 TABLET | Refills: 0 | Status: SHIPPED | OUTPATIENT
Start: 2025-01-30

## 2025-01-30 RX ORDER — ONDANSETRON 4 MG/1
4 TABLET, ORALLY DISINTEGRATING ORAL EVERY 6 HOURS PRN
Qty: 15 TABLET | Refills: 0 | Status: SHIPPED | OUTPATIENT
Start: 2025-01-30

## 2025-01-30 RX ORDER — LIDOCAINE HYDROCHLORIDE AND EPINEPHRINE 10; 10 MG/ML; UG/ML
5 INJECTION, SOLUTION INFILTRATION; PERINEURAL ONCE
Status: COMPLETED | OUTPATIENT
Start: 2025-01-30 | End: 2025-01-30

## 2025-01-30 RX ADMIN — ACETAMINOPHEN 325 MG: 325 TABLET, FILM COATED ORAL at 09:12

## 2025-01-30 RX ADMIN — PSYLLIUM HUSK 1 PACKET: 3.4 POWDER ORAL at 08:35

## 2025-01-30 RX ADMIN — ACETAMINOPHEN 325 MG: 325 TABLET, FILM COATED ORAL at 13:08

## 2025-01-30 RX ADMIN — ONDANSETRON 4 MG: 4 TABLET, ORALLY DISINTEGRATING ORAL at 09:51

## 2025-01-30 RX ADMIN — ONDANSETRON 4 MG: 4 TABLET, ORALLY DISINTEGRATING ORAL at 16:56

## 2025-01-30 RX ADMIN — METHOCARBAMOL 500 MG: 500 TABLET ORAL at 14:47

## 2025-01-30 RX ADMIN — Medication 1000 MCG: at 08:35

## 2025-01-30 RX ADMIN — LIDOCAINE HYDROCHLORIDE,EPINEPHRINE BITARTRATE 5 ML: 10; .01 INJECTION, SOLUTION INFILTRATION; PERINEURAL at 13:09

## 2025-01-30 RX ADMIN — ACETAMINOPHEN 325 MG: 325 TABLET, FILM COATED ORAL at 16:56

## 2025-01-30 ASSESSMENT — ACTIVITIES OF DAILY LIVING (ADL)
ADLS_ACUITY_SCORE: 43

## 2025-01-30 NOTE — PLAN OF CARE
Goal Outcome Evaluation:      Plan of Care Reviewed With: patient    Overall Patient Progress: no changeOverall Patient Progress: no change    Outcome Evaluation: To drain 7ml/hr q2hrs. Awaiting PT eval and LD removal today.    Status: Admitted 1/27 for NPH trial  Vitals: VSS on RA  Neuros: A&Ox4, forgetful. 5/5. Denies salty/metallic taste. Some HA, intermittent nausea, and stiff neck after LD empties  IV: PIV SL  Resp: LSC, denies SOB  Diet: Regular-poor PO. Zofran given for nausea  GI: LBM 1/27, takes daily metamucil  : Voiding in BR  Skin: LD site CDI  Pain: Declined Tylenol @ 0000 - pt states it keeps her up at night. Hot pack given for L hip pain.  Activity: SBA, GB - BA on  Social: Daughter at bedside during eves  Plan: Drain 7ml/hr q2hrs (odds). PT to eval today and then LD removed. Cont to monitor and follow POC

## 2025-01-30 NOTE — INTERIM SUMMARY
Neurosurgery Brief Progress Note    Lumbar drain removal    Drain not deemed to be necessary due to conclusion of trial. Drain site was prepped in usual sterile fashion with chloraprep. The drain was clamped. The sutures securing the drain were cut and the site was re-prepped. The drain was removed with tip intact. A single figure of 8 stitch with 3-0 monocryl was placed with adequate hemostasis. No immediate complication was observed and the patient tolerated the procedure well. Please reach out to the on-call resident for further questions.     Patient to lay flay for at least 1 hour.    Liza Bui MD  Neurosurgery PGY-1  Pager #[phone]

## 2025-01-30 NOTE — PLAN OF CARE
Normal Pressure Hydrocephalus - Physical Therapy Assessment     Date:  01/30/2025     1. Mini Best Test:  The Mini-BESTest assesses reactive postural, anticipatory, sensory orientation, and dynamic gait balance.  Gait assistive device used: none      Patient Score Pre-drain: 23/28  Patient Score Day 3: 21/28     Scores of <17.5/28 have identified people with chronic stroke that have a history of falling according to Franco et al 2013.   Scores of <22/28 are predictive of increased fall risk for persons with Parkinson's Disease according to Sabas FAN et al 2020.     Minimally Detectable Change (MDC) for persons with Parkinson's Disease 5.52pts per Brittany et al 2011.  Minimally Clinically Significant Difference (MCID) for persons with Balance Disorders: 4pts according to Dalila et al 2013.     2. Additional Functional Measure: 10-Meter Walk Test    Pre-drain: Comfortable pace -  0.92 m/s                    Fast Pace - 1.2 m/s  Day 3:  Comfortable pace - 0.83 m/s                Fast Pace - 1.07 m/s     3. Additional Functional Measure: 2-Minute Walk Test   Pre-drain: 765ft  Day 3: 365 ft      Minutes billed as a Physical Performance test: 42 mins

## 2025-01-30 NOTE — PLAN OF CARE
Goal Outcome Evaluation:    Status: Patient admitted 1/27 for LD placement and NPH trial. Patient had a fall two years ago and has been having gait and balance issues.   Vitals: VSS on RA  Neuros: A/O x4. 5/5 t/o. L [place] - hearing aid. Forgetful at times. Gen weakness. Per pt, weakness and headache increased today. Intermittently nauseous, Zofran po x2 today. Denies salty/metallic taste  IV: PIV SL  Resp: LSC   Diet: Regular. Pt reports decreased appetite since LD placement  GI: LBM 1/27. Not passing gas  : Voiding spontaneously to bathroom  Skin: LD site CDI. Drained 10mg Q 2 hours this shift. Per N.S request, no drainage was collected for the 1500 hour. Resumed draining at 1600 at 7cc/hr.  Pain: 2/10 neck stiffness/headache, tends to worsen to 6/10 after LD draining. Pt needs reminders to change positions and get OOB slowly. Tylenol, Robaxin and Oxydodone for fair relief. NS aware  Activity: SBA GB. Unsteady gait at times. Walked in tiwari with daughter. Felt increased pain during walk and resting in a chair in hallway.  Plan: PT to evaluate 1/30.      Goal Outcome Evaluation:       Plan of Care Reviewed With: patient     Overall Patient Progress: no change     Outcome Evaluation: LD draining 7 mL Q 2 hr (odd hours)

## 2025-01-30 NOTE — PLAN OF CARE
Vitals: VSS   Neuros: Unchanged: [place] Forgetful at times. Gen weakness. Denies salty metallic taste. LD removed at 1300, pt to remain on bedrest for 1 hour.   IV: PIV SL  Resp: WNL  Diet: Regular - Fair po. Zofran given for nausea.   GI: BM 1/30  : Voiding spontaneously   Skin: Intact besides old LD site.   Pain: Headache managed with tylenol.   Activity: SBA GB.   Plan: PT to evaluate anytime after 1415. Pt will discharge today.     1445 - Pt has 8/10 neck pain and had to stop working with therapy. RN gave robaxin and let NSG team know. Physical Therapy will try again after pain medication has time to work.        Goal Outcome Evaluation:      Plan of Care Reviewed With: patient, child    Overall Patient Progress: no changeOverall Patient Progress: no change    Outcome Evaluation: [id number]

## 2025-01-30 NOTE — PLAN OF CARE
Goal Outcome Evaluation:      Plan of Care Reviewed With: patient, child    Overall Patient Progress: improvingOverall Patient Progress: improving    Outcome Evaluation: LD out, off bedrest. Discharging this afternoon.    Discharge time/date: 1/30/25 1747  Walked or Wheelchair: W/c  PIV removed: Yes  Reviewed AVS with patient: Yes  Medication due times added to AVS in EPIC: Yes  Verbalized understanding of discharge with teachback: Yes  Medications retrieved from pharmacy: Yes  Supplies sent home: n/a  Belongings from security with patient: n/a

## 2025-01-30 NOTE — DISCHARGE SUMMARY
[facility] Discharge Summary and Instructions    Vidya Brink MRN# [id number]   Age: 74 year old YOB: 1950     Date of Admission:  1/27/2025  Date of Discharge::  1/30/2025  Admitting Physician:  Naveen Orona MD  Discharge Physician:  Naveen Orona MD          Admission Diagnoses:   NPH (normal pressure hydrocephalus) (H) [G91.2]          Discharge Diagnosis:     NPH (normal pressure hydrocephalus) (H) [G91.2]          Procedures:   Lumbar drain placement on 1/27/2025            Brief History of Illness:   Vidya Brink is a 73 y/o woman with possible NPH presents for lumbar drain placement and NPH trial. She had a fall two years ago and has been having gait and balance issues since that time. She feels quite unsteady when she walks, not vertigo per se or having the room spin, but more like she's on the deck of a boat in the water. She feels her unsteadiness has been progressively worsening over the last 6 months to a year. She is able to ambulate on her own and has not had any falls since her original fall two years ago. In terms of cognition, she feels she processes things more slowly and is more forgetful. She is obviously operating from a very high baseline though as she is a .  No urinary symptoms.  Patient has elected to undergo above-mentioned procedure.           Hospital Course:   Patient underwent above-mentioned procedure on 1/27/2025.PT assessed patient prior to the LD placement and after removal  During LD drain trial the plan was to drain 15 mL of CSF every 2 hours.  Patient experienced several positional headaches and ultimately CSF drainage was decreased to 7 mL Q 2 hours. Neuropsychology evaluated patient on 1/30/2025.     On post procedure day 4, she was ambulating, voiding without a archer, eating a regular diet, pain was well controlled, drain was removed followed by an hour of flat bedrest, and therefore she was discharged to home.  Patient will  follow-up with Dr. Orona on 2/4/2025.          Discharge Medications:     Current Discharge Medication List        CONTINUE these medications which have NOT CHANGED    Details   CALCIUM-VITAMIN D PO Take 600 mg by mouth daily.      cholecalciferol (VITAMIN D3) 10 mcg (400 units) TABS tablet Take 20 mcg by mouth daily.      cyanocobalamin (VITAMIN B-12) 1000 MCG tablet       ketoconazole (NIZORAL) 2 % external cream APPLY CREAM TOPICALLY TWICE DAILY TO FEET      RISEdronate (ACTONEL) 35 MG tablet Take 35 mg by mouth every 7 days. Takes on Sundays              Exam:   Physical Exam  /71   Pulse 62   Temp 98.3  F (36.8  C) (Oral)   Resp 16   SpO2 95%   General: Appears comfortable, NAD  Wound: Incision, clean, dry, intact without strikethrough  Neurologic Exam:  - AOx3.  - Follows commands.  - Speech fluent, spontaneous. No aphasia or dysarthria.  - No gaze preference. No apparent hemineglect.  - PERRL, EOMI.  - Face symmetric with sensation intact to light touch.  - Palate elevates symmetrically, uvula midline, tongue protrudes midline.  - Trapezii and sternocleidomastoid muscles 5/5 bilaterally.  - No pronator drift.  Motor: Normal bulk/tone; no tremor, rigidity, or bradykinesia.            Discharge Instructions and Follow-Up:     Discharge diet: Regular   Discharge activity: You may advance activity as tolerated. No strenuous exercise or heay lifting greater than 10 lbs for 4 weeks or until seen and cleared in clinic.     Discharge follow-up:     Follow-up Dr. Naveen Orona MD on 2/4/2025, all additional follow-up visits to be determined by Dr. Naveen Orona MD       Wound care: Ok to shower,however no scrubbing of the wound and no soaking of the wound, meaning no bathtubs or swimming pools. Pat dry only. Leave wound open to air.  Patient to have wound checked 2 weeks following surgery.    Post-op care at follow-up: Keep dry and clean    -You have a absorbable stitch in your lower back from  the drain that was placed during LD removal         Please call if you have:  1. increased pain, redness, drainage, swelling at your incision  2. fevers > 101.5 F degrees  3. with any questions or concerns.  You may reach the Neurosurgery clinic at [phone] during regular work hours. ER at [phone].    and ask for the Neurosurgery Resident on call at [phone], during off hours or weekends.         Discharge Disposition:     Discharged to home        SHANNAN Hercules, CNP  Neurosurgery  Pager [phone]    Liza Bui MD  Neurosurgery PGY-1  Pager #[phone]  Please page/[facility] on-call neurosurgery with questions

## 2025-01-31 NOTE — PROGRESS NOTES
NEUROPSYCHOLOGICAL EVALUATION     NAME: Vidya Brink  [facility] NUMBER: [id number]   : 1950     DOS: 2025    IDENTIFYING INFORMATION  Vidya Brink is a 74-year-old, right-handed, , with 19 years of formal education. She was evaluated at bedside.      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 She had a fall on 2022 and was evaluated at urgent care on 1/3/2023. CT imaging of her head from that time detailed ventriculomegaly. She has since been evaluated by Neurology and Neurosurgery for possible normal pressure hydrocephalus (NPH). Per Dr. Orona's 2024 neurosurgery note, Ms. Brink has noticed intermittent balance and gait disturbances since her fall in . She has not had urinary symptoms. Regarding cognition, she has been dealing with slowed processing speed and slowed name recall. She also described instances of repeating herself in conversation since her fall in 2022. The course of cognitive complaints has been stable.     Ms. Brink completed formal neuropsychological evaluation under the direction of Dr. Nafisa Maciel at the [facility] on 2023. Her cognition was within expected limits (see 3/1/2023 neuropsychological report for full details). She was also seen for a neuropsychology exam on 2025 under the direction of Dr. Aramis Driver. This evaluation documented broadly normal cognition across all domains assessed. Compared to her  neuropsychological evaluation, there was no significant evidence for cognitive decline. She endorsed minimal symptoms of depression. The findings were not felt to be overly consistent with cognitive sequalae resultant from NPH, nor were they suggestive of a progressive neurodegenerative condition.     Ms. Brink was admitted to the [facility] on 2025 for a lumbar  drain trial. The lumbar drain was placed on 1/27/2025. Records from her admission indicate that she was experiencing positional headaches and neck stiffness. The drainage rate from the lumbar drain was reduced in response to her headachces and neck stiffness. The current follow-up neuropsychological evaluation was requested by Dr. Naveen Orona, in this context.      On interview, Ms. Brink confirmed the above history. She reported that she has been feeling pretty good, excepting positional headaches, mild nausea, and limited sleep. She indicated initial neck stiffness following drain placement that had since resolved. She was unaware of any significant improvements in her gait following drain placement. She denied having identified any changes in her urinary retention after the drain was placed. She reported some  fogginess  in her thinking related to a lack of sleep while hospitalized but otherwise denied significant cognitive changes following drain placement.      With respect to mental health, Ms. Brink reported that her mood has been stable. She denied significant changes in her mental health status. She specifically denied suicidal ideation or interval suicide attempts.     Regarding other medical background, Ms. Brink denied interval head injury, stroke, or seizure. She reported a positional headache at the time of the interview and indicated that she had not had a bowel movement for 3 days. She also endorsed hip pain related to bursitis. Her overall pain rating at the time of the examination was  2-3/10.  She denied significant medical changes since the time of her baseline neuropsychology exam. Per records, her current medications at discharge included: calcium-vitamin D, cholecalciferol, cyanocobalamin, ketoconazole, and risedronate. She denied interval substance use.     By way of background, Ms. Brink denied changes in her living arrangement, level of independence, family, education  history, or work history since the time of the baseline neuropsychology exam.  Please see the previous neuropsychology report for more details.     BEHAVIORAL OBSERVATIONS  Ms. Brink was polite and cooperative with the exam. Vision and hearing were adequate for testing purposes. She reported positional headaches throughout the exam. She indicated hunger during the first portion of testing and clinical interview. Her breakfast arrived midway through testing, and she ate a small meal while completing testing. During the first trial of a visual learning and memory test, she indicated an urgent need to use the restroom. Testing was paused for approximately 10 minutes. Testing was also briefly interrupted by housekeeping during a single trial of category verbal fluency. Her speech was unremarkable. Her comprehension was normal. Her thought processes were unremarkable. Frustration tolerance was appropriate. Her mood was broadly neutral with congruent affect. Her effort was rated as good. The current results are felt to be an accurate reflection of her cognitive functioning.      RESULTS OF EXAM  Her performances on standardized measures of neuropsychological functioning were as follows.      She was oriented to time, place, and various aspects of personal information. She correctly named the current [place] president and 5/5 of the most recent past [place] presidents. She obtained a passing score on a stand-alone and on embedded metrics of cognitive performance validity. Auditory attention for digits was high average. Learning of words in a list format was high average. Delayed recall of list words was above average. Percent retention of list words was high average. Delayed recognition of list words was average. Learning of simple geometric shapes and their spatial locations was below average but was likely attenuated due to the aforementioned testing disruption. Delayed recall of the shapes and their locations was below  average. Delayed recognition of the shapes was within expected limits. Verbal associative fluency was high average. Semantic fluency was average. Semantic verbal fluency with a cognitive flexibility/set-switching component was high average. Speeded visual sequencing under focused attention was average. A similar measure with a divided attention component was high average to above average. Speeded word reading was high average. Speeded color naming was high average. Speeded response inhibition performance was high average. Performance on a task of speeded inhibition with a cognitive flexibility/set-switching component was high average. Copy of a complex figure was notable for a disorganized, piecemeal approach but evidenced no gross visuospatial distortions in the overall gestalt configuration. Spontaneous production and copy of a clock were within expected limits.     She endorsed minimal symptoms of depression on a self-report measure.      IMPRESSIONS  In this circumscribed inpatient exam, Ms. Brink demonstrated broadly normal cognition across all domains assessed. Given the circumstances with her lumbar drain and testing at bedside, the current results are generally stable relative to the baseline exam. She again demonstrated mildly weaker performances on a task of visual memory, although these scores may have been attenuated by an unforeseen testing disruption. She is not reporting significantly elevated symptoms of depression. These findings do not strongly support the possibility of cognitive improvement following a shunt placement.  However, they also do not argue against the placement of the shunt.     RECOMMENDATIONS     1.  The final decision about placing a  shunt is, of course, deferred to the Neurosurgery team.     2.  If she does undergo shunt placement, follow-up neuropsychological evaluation is recommended in 6 to 12 months in order to assess and update recommendations as appropriate.  The  current results can be used as a baseline at that time.     Alan Funes, Ph.D.  Neuropsychology Fellow     Aramis Driver, Ph.D., L.P., ABPP  Board Certified in Clinical Neuropsychology   / Licensed Psychologist [id number]     Time spent:  One unit (70 minutes) neuropsychological testing evaluation by licensed and board-certified neuropsychologist, including integration of patient data, interpretation of standardized test results and clinical data, clinical decision-making, treatment planning, and report, first hour (CPT 52855). One unit (30 minutes) of psychological and neuropsychological test administration and scoring by technician, first 30 minutes (CPT 73682). Five units (153 minutes) psychological or neuropsychological test administration and scoring by technician, subsequent 30 minutes (CPT 00932). Diagnoses: G91.2, F06.8.

## 2025-02-03 ENCOUNTER — TELEPHONE (OUTPATIENT)
Dept: NEUROSURGERY | Facility: CLINIC | Age: 75
End: 2025-02-03
Payer: COMMERCIAL

## 2025-02-03 NOTE — CONFIDENTIAL NOTE
Called pt to check on status following lumbar drain trial last week, as she reported ongoing positional headaches on 1/31. Headaches have improved in terms of intensity (now 4/10 down from 8-9/10). She is also tolerates being upright for longer intervals before needing to lay flat again. She is fatigued by the headaches b/c they have been going on since last week.     Pt is scheduled to see Dr. Orona tomorrow to discuss the results of the trial, headache, and next steps. Advised pt to continue to rest/lay flat as needed and to keep activity to moderate levels until headache improves or advised otherwise by Dr. Orona.     Pt expressed understanding of the above. No further questions at this time.

## 2025-02-04 ENCOUNTER — OFFICE VISIT (OUTPATIENT)
Dept: NEUROSURGERY | Facility: CLINIC | Age: 75
End: 2025-02-04
Payer: COMMERCIAL

## 2025-02-04 ENCOUNTER — DOCUMENTATION ONLY (OUTPATIENT)
Dept: NEUROSURGERY | Facility: CLINIC | Age: 75
End: 2025-02-04

## 2025-02-04 VITALS
WEIGHT: 155.4 LBS | OXYGEN SATURATION: 100 % | SYSTOLIC BLOOD PRESSURE: 117 MMHG | HEART RATE: 82 BPM | HEIGHT: 68 IN | DIASTOLIC BLOOD PRESSURE: 76 MMHG | BODY MASS INDEX: 23.55 KG/M2 | RESPIRATION RATE: 16 BRPM

## 2025-02-04 DIAGNOSIS — G91.2 NORMAL PRESSURE HYDROCEPHALUS (H): Primary | ICD-10-CM

## 2025-02-04 PROCEDURE — 99212 OFFICE O/P EST SF 10 MIN: CPT | Performed by: NEUROLOGICAL SURGERY

## 2025-02-04 ASSESSMENT — PAIN SCALES - GENERAL: PAINLEVEL_OUTOF10: NO PAIN (0)

## 2025-02-04 NOTE — LETTER
2/4/2025       RE: Vidya Brink  [address]  [facility] [address]     Dear Colleague,    Thank you for referring your patient, Vidya Brink, to the [facility] [place] at [facility]. Please see a copy of my visit note below.    Neurosurgery Attending NPH Post-LD Note    HPI:Vidya Brink is a 73 y/o woman with possible NPH here to discuss the results of her recent lumbar drain trial. She had severe postural headaches throughout the entirety of the lumbar drain trial which made it a bit difficult for her to assess her symptoms, but broadly she did not feel much subjective improvement. Her objective measurements as detailed below also support that. On their face, the mean reaction time and step lengths in response to multiple pulls during pull testing look improved, but this is really just a function of being pulled less intensely after the drain was removed (see Reaction time and step length vs. Pull intensity plots where all postLD pulls are on lower end of x axis resulting in biased mean plots). PT testing supports the quantitative kinematics with actually a slight degradation in her performance post-LD, similar to the quantitative gait testing.    Exam:  Awake, alert, oriented x 3  Attends, follows, fluent  PERRL  EOMI  FS  TML  BUE/BLE 5/5, no drift    Lumbar Drain Testing:  Quantitative Kinematics  Gait:  Stride velocity (m/s)  Stride length (m)  PreLD  0.81    0.97  PostLD 0.64    0.80    Balance:                  PT:  MiniBEST: 23->21  2 min walk: 233.3 m -> 111.3 m  10 m walk:comfortable pace  0.92 m/s ->0.83 m/s;  fast pace 1.2 m/s->1.07 m/s    Neuropsych  Largely stable compared to baseline    A/P:  73 y/o woman with possible NPH. We discussed her results at length. Given the lack of improvement after lumbar drain, it is difficult to recommend moving forward with a shunt. We did discuss that a small  percentage of patients with a negative lumbar drain trial do improve after  shunt, but generally we do not recommend moving forward with shunt as the risks would not outweigh the potential benefits. She and her family agree. For now, we will proceed with gait and balance PT and a return visit in 6 months for repeat quantitative testing.     - RTC in 6 months for repeat quantitative kinematics   - Refer for gait/balance PT in intervening time  - Defer further workup to Neurology team      Again, thank you for allowing me to participate in the care of your patient.      Sincerely,    Naveen Orona MD

## 2025-02-04 NOTE — NURSING NOTE
"Chief Complaint   Patient presents with    RECHECK     /76 (BP Location: Right arm, Patient Position: Sitting, Cuff Size: Adult Regular)   Pulse 82   Resp 16   Ht 1.727 m (5' 8\")   Wt 70.5 kg (155 lb 6.4 oz)   SpO2 100%   BMI 23.63 kg/m    Ann Marie Jimenez    "

## 2025-02-05 ENCOUNTER — TELEPHONE (OUTPATIENT)
Dept: NEUROSURGERY | Facility: CLINIC | Age: 75
End: 2025-02-05
Payer: COMMERCIAL

## 2025-02-05 NOTE — PROGRESS NOTES
Neurosurgery Attending NPH Post-LD Note    HPI:Vidya Brink is a 73 y/o woman with possible NPH here to discuss the results of her recent lumbar drain trial. She had severe postural headaches throughout the entirety of the lumbar drain trial which made it a bit difficult for her to assess her symptoms, but broadly she did not feel much subjective improvement. Her objective measurements as detailed below also support that. On their face, the mean reaction time and step lengths in response to multiple pulls during pull testing look improved, but this is really just a function of being pulled less intensely after the drain was removed (see Reaction time and step length vs. Pull intensity plots where all postLD pulls are on lower end of x axis resulting in biased mean plots). Her pull test plots also show a reversal of the normal pull test response with smaller steps for increasing pull intensity when there should be larger steps. Similarly, reaction time was either flat or increasing for increased pull intensity when there should be reduced reaction time. PT testing supports the quantitative kinematics with actually a slight degradation in her performance post-LD, similar to the quantitative gait testing.    Exam:  Awake, alert, oriented x 3  Attends, follows, fluent  PERRL  EOMI  FS  TML  BUE/BLE 5/5, no drift    Lumbar Drain Testing:  Quantitative Kinematics  Gait:  Stride velocity (m/s)  Stride length (m)  PreLD  0.81    0.97  PostLD 0.64    0.80    Balance:                  PT:  MiniBEST: 23->21  2 min walk: 233.3 m -> 111.3 m  10 m walk:comfortable pace  0.92 m/s ->0.83 m/s;  fast pace 1.2 m/s->1.07 m/s    Neuropsych  Largely stable compared to baseline    A/P:  73 y/o woman with possible NPH. We discussed her results at length. Given the lack of improvement after lumbar drain, it is difficult to recommend moving forward with a shunt. We did discuss that a small percentage of patients with a negative lumbar  drain trial do improve after  shunt, but generally we do not recommend moving forward with shunt as the risks would not outweigh the potential benefits. She and her family agree. For now, we will proceed with gait and balance PT and a return visit in 6 months for repeat quantitative testing.     - RTC in 6 months for repeat quantitative kinematics   - Refer for gait/balance PT in intervening time  - Defer further workup to Neurology team

## 2025-02-19 ENCOUNTER — TRANSCRIBE ORDERS (OUTPATIENT)
Dept: OTHER | Age: 75
End: 2025-02-19

## 2025-02-19 DIAGNOSIS — R10.31 RIGHT INGUINAL PAIN: Primary | ICD-10-CM

## 2025-02-21 ENCOUNTER — OFFICE VISIT (OUTPATIENT)
Dept: SURGERY | Facility: CLINIC | Age: 75
End: 2025-02-21
Payer: COMMERCIAL

## 2025-02-21 VITALS — WEIGHT: 155 LBS | BODY MASS INDEX: 23.57 KG/M2

## 2025-02-21 DIAGNOSIS — R10.31 RIGHT INGUINAL PAIN: ICD-10-CM

## 2025-02-21 PROCEDURE — 99243 OFF/OP CNSLTJ NEW/EST LOW 30: CPT | Performed by: SPECIALIST

## 2025-02-21 RX ORDER — ACETAMINOPHEN 325 MG/1
975 TABLET ORAL ONCE
OUTPATIENT
Start: 2025-02-21 | End: 2025-02-21

## 2025-02-21 NOTE — LETTER
"2/21/2025      Vidya Brink  [address]  [facility] [address]      Dear Colleague,    Thank you for referring your patient, Vidya Brink, to the [facility] AND [facility] [place]. Please see a copy of my visit note below.          HPI:  This is a 74 year old female here today with concerns of pain and bulging in her right groin area. She has noted this for the past a a few  month. The symptoms have progressed and increased over this time. She comes in for evaluation secondary to the hernia causing enough issues to bother them with daily activities or chores.    Allergies:Fosamax [alendronate]    Past Medical History:   Diagnosis Date     Arthritis      Colon cancer (H) 1989, 1990     Fibrocystic breast      Jamse syndrome      Pneumonia        Past Surgical History:   Procedure Laterality Date     ARTHROSCOPY SHOULDER ROTATOR CUFF REPAIR Right      COLON SURGERY      subtotal colectomy     HYSTERECTOMY       LAPAROSCOPIC HERNIORRHAPHY INGUINAL Left 09/17/2020    Procedure: LAPAROSCOPIC LEFT INGUINAL HERNIA REPAIR, POSSIBLE OPEN;  Surgeon: Donny Carrillo MD;  Location: [facility];  Service: General     PELVIC LAPAROSCOPY      endometriosis     SALPINGOOPHORECTOMY         CURRENT MEDS:  No current facility-administered medications for this visit.       Family History   Problem Relation Age of Onset     Ataxia Sister         \"Balance problems\"     Ataxia Brother         \"Balance problems\"     Breast Cancer Paternal Grandmother 48     James Syndrome Son         s/p Aleksandr        reports that she has never smoked. She has never used smokeless tobacco. She reports that she does not currently use alcohol.    Review of Systems - Negative except right groin bulge and pain. Prior hernia repair on the other side. Otherwise twelve system of review is negative.      Vitals:    02/21/25 1343   Weight: 70.3 kg (155 lb)       Body mass index is 23.57 " kg/m .    EXAM:  General: NAD   HEENT: normocephalic, PERRLA and EOMS intact  Mounth: Mucus membranes moist  Neck: Supple  Chest: Clear to auscultation bilaterally  CV: RRR  ABD: Soft nontender and nondistended, right inguinal hernia, reducible  EXT: Warm, pulses intact,   Neuro: Alert and oriented x3  Back: no CVA tenderness    Assessment/Plan: Pt with a right inguinal hernia. I discussed this at length with She.  I went over conservative management as well as surgical treatment of this.. I would plan on doing this via anopen  approach with possible use of mesh. I went over the small risks of surgery including but not limited to bleeding and infection, anesthesia, recurrence rates and nerve injury. I discussed the expected recovery time as well. Will get this scheduled. She will contact us to have this scheduled.      Donny Carrillo MD ,MD Donny Carrillo MD  General Surgery [phone]  Vascular Surgery [phone]          Again, thank you for allowing me to participate in the care of your patient.        Sincerely,        Donny Carrillo MD    Electronically signed

## 2025-02-28 ENCOUNTER — TELEPHONE (OUTPATIENT)
Dept: NEUROSURGERY | Facility: CLINIC | Age: 75
End: 2025-02-28
Payer: COMMERCIAL

## 2025-02-28 NOTE — TELEPHONE ENCOUNTER
[facility] Call Center    Phone Message    May a detailed message be left on voicemail: yes     Reason for Call: Other: Patient calling and is concerned about putting this off.  She feels like she is getting less stable walking and is wanting to discuss going forward with the shunt.  Please call her back to advise.      Action Taken: Message routed to:  [facility] ([facility] Neurosurgery     Travel Screening: Not Applicable     Date of Service:

## 2025-02-28 NOTE — TELEPHONE ENCOUNTER
Pt s/p lumbar drain trial for NPH and follow up appt with Dr. Orona to discuss the results of the trial on 2/4/25. At that appt, it was discussed that since pt had a lack of improvement in NPH symptoms during the trial, it was recommended that pt not move forward with a shunt, initiate PT for balance and gait, and return to clinic in 6 months.    Pt says that her gait and balance continue to worsen and she would like to revisit the discussion about a shunt. She has not had any falls.     Pt says she is doing PT at home but has not actually gone to physical therapy. We discussed perhaps scheduling a PT appt so that pt can know what her baseline is, which would help determine if she is improving, worsening or holding steady. Pt expressed understanding and is in agreement with this plan.     Pt would like to move the current August appointment to April. Will discuss this with Dr. Orona and get back to patient. Pt in agreement with plan and has no further questions at this time.

## 2025-03-04 ENCOUNTER — DOCUMENTATION ONLY (OUTPATIENT)
Dept: NEUROSURGERY | Facility: CLINIC | Age: 75
End: 2025-03-04
Payer: COMMERCIAL

## 2025-03-04 NOTE — PROGRESS NOTES
"      HPI:  This is a 74 year old female here today with concerns of pain and bulging in her right groin area. She has noted this for the past a a few  month. The symptoms have progressed and increased over this time. She comes in for evaluation secondary to the hernia causing enough issues to bother them with daily activities or chores.    Allergies:Fosamax [alendronate]    Past Medical History:   Diagnosis Date    Arthritis     Colon cancer (H) 1989, 1990    Fibrocystic breast     James syndrome     Pneumonia        Past Surgical History:   Procedure Laterality Date    ARTHROSCOPY SHOULDER ROTATOR CUFF REPAIR Right     COLON SURGERY      subtotal colectomy    HYSTERECTOMY      LAPAROSCOPIC HERNIORRHAPHY INGUINAL Left 09/17/2020    Procedure: LAPAROSCOPIC LEFT INGUINAL HERNIA REPAIR, POSSIBLE OPEN;  Surgeon: Donny Carrillo MD;  Location: [facility];  Service: General    PELVIC LAPAROSCOPY      endometriosis    SALPINGOOPHORECTOMY         CURRENT MEDS:  No current facility-administered medications for this visit.       Family History   Problem Relation Age of Onset    Ataxia Sister         \"Balance problems\"    Ataxia Brother         \"Balance problems\"    Breast Cancer Paternal Grandmother 48    James Syndrome Son         s/p Aleksandr        reports that she has never smoked. She has never used smokeless tobacco. She reports that she does not currently use alcohol.    Review of Systems - Negative except right groin bulge and pain. Prior hernia repair on the other side. Otherwise twelve system of review is negative.      Vitals:    02/21/25 1343   Weight: 70.3 kg (155 lb)       Body mass index is 23.57 kg/m .    EXAM:  General: NAD   HEENT: normocephalic, PERRLA and EOMS intact  Mounth: Mucus membranes moist  Neck: Supple  Chest: Clear to auscultation bilaterally  CV: RRR  ABD: Soft nontender and nondistended, right inguinal hernia, reducible  EXT: Warm, pulses intact,   Neuro: Alert and oriented x3  Back: " no CVA tenderness    Assessment/Plan: Pt with a right inguinal hernia. I discussed this at length with She.  I went over conservative management as well as surgical treatment of this.. I would plan on doing this via anopen  approach with possible use of mesh. I went over the small risks of surgery including but not limited to bleeding and infection, anesthesia, recurrence rates and nerve injury. I discussed the expected recovery time as well. Will get this scheduled. She will contact us to have this scheduled.      Donny Carrillo MD ,MD Donny Carrillo MD  General Surgery [phone]  Vascular Surgery [phone]

## 2025-03-04 NOTE — CONFIDENTIAL NOTE
Called [facility] [place] clinic and they said they do not have the PT referral that was originally faxed on 2/4/25. Re-faxed the PT order and face sheet to [facility] at [phone].

## 2025-03-04 NOTE — TELEPHONE ENCOUNTER
Spoke with pt to let her know that Dr. Orona can see her sooner for gait/balance issues but he would like to keep the appt that is currently scheduled in August. We discussed April 15 at 1:00 pm. The appt is on hold and I have asked scheduling to add it to Dr. Orona's schedule.     Pt said she is scheduled for gait/balance PT assessment at [facility] on 3/7/25. She said that they had some questions about the referral so I will follow up with them. The referral was faxed on 2/4/25.     Pt expressed understanding and has no further questions at this time.

## 2025-03-18 ENCOUNTER — OFFICE VISIT (OUTPATIENT)
Dept: NEUROSURGERY | Facility: CLINIC | Age: 75
End: 2025-03-18
Payer: COMMERCIAL

## 2025-03-18 VITALS
RESPIRATION RATE: 16 BRPM | OXYGEN SATURATION: 98 % | SYSTOLIC BLOOD PRESSURE: 128 MMHG | DIASTOLIC BLOOD PRESSURE: 77 MMHG | HEART RATE: 85 BPM

## 2025-03-18 DIAGNOSIS — G91.2 NORMAL PRESSURE HYDROCEPHALUS (H): Primary | ICD-10-CM

## 2025-03-18 PROCEDURE — 3078F DIAST BP <80 MM HG: CPT | Performed by: NEUROLOGICAL SURGERY

## 2025-03-18 PROCEDURE — 3074F SYST BP LT 130 MM HG: CPT | Performed by: NEUROLOGICAL SURGERY

## 2025-03-18 PROCEDURE — 99213 OFFICE O/P EST LOW 20 MIN: CPT | Performed by: NEUROLOGICAL SURGERY

## 2025-03-18 NOTE — PROGRESS NOTES
Neurosurgery Attending NPH Post-LD Note     HPI:Vidya Brink is a 75 y/o woman with possible NPH here to discuss the results of her recent lumbar drain trial. She had severe postural headaches throughout the entirety of the lumbar drain trial which made it a bit difficult for her to assess her symptoms, but broadly she did not feel much subjective improvement. Her objective measurements as detailed below also support that. On their face, the mean reaction time and step lengths in response to multiple pulls during pull testing look improved, but this is really just a function of being pulled less intensely after the drain was removed (see Reaction time and step length vs. Pull intensity plots where all postLD pulls are on lower end of x axis resulting in biased mean plots). Her pull test plots also show a reversal of the normal pull test response with smaller steps for increasing pull intensity when there should be larger steps. Similarly, reaction time was either flat or increasing for increased pull intensity when there should be reduced reaction time. PT testing supports the quantitative kinematics with actually a slight degradation in her performance post-LD, similar to the quantitative gait testing.     Thinking about doing surgery anyway, understands risks/benefits. Feels there aren't alternatives besides slowly getting worse. Wants to get hernia repair done first likely. Has had abdominal surgery in past, left hernia and hysterectomy. No other meds. Many questions.     Exam:  Awake, alert, oriented x 3  Attends, follows, fluent  PERRL  EOMI  FS  TML  BUE/BLE 5/5, no drift     Lumbar Drain Testing:  Quantitative Kinematics  Gait:                 Stride velocity (m/s)                Stride length (m)  PreLD              0.81                                         0.97  PostLD            0.64                                         0.80     Balance:                     PT:  MiniBEST: 23->21  2 min walk:  233.3 m -> 111.3 m  10 m walk:comfortable pace  0.92 m/s ->0.83 m/s;  fast pace 1.2 m/s->1.07 m/s     Neuropsych  Largely stable compared to baseline     A/P:  73 y/o woman with possible NPH. We discussed her results at length. Given the lack of improvement after lumbar drain, it is difficult to recommend moving forward with a shunt. We did discuss that a small percentage of patients with a negative lumbar drain trial do improve after  shunt, but generally we do not recommend moving forward with shunt as the risks would not outweigh the potential benefits. She and her family agree. For now, we will proceed with gait and balance PT and a return visit in 6 months for repeat quantitative testing.      - RTC in 6 months for repeat quantitative kinematics   - Refer for gait/balance PT in intervening time  - Defer further workup to Neurology team    A/P:  y/o with probable NPH    Risks including but not limited to bleeding, infection and shunt malfunction as well as benefits and alternatives to surgery were discussed with the patient. ***e wishes to proceed with surgery.    - Schedule for stereotactic ventriculoperitoneal shunt placement

## 2025-03-18 NOTE — LETTER
3/18/2025       RE: Vidya Brink  [address]  [facility] [address]     Dear Colleague,    Thank you for referring your patient, Vidya Brink, to the [facility] [place] at [facility]. Please see a copy of my visit note below.    Neurosurgery Attending NPH Post-LD Note     HPI:Vidya Brink is a 75 y/o woman with possible NPH here to discuss the results of her recent lumbar drain trial. She had severe postural headaches throughout the entirety of the lumbar drain trial which made it a bit difficult for her to assess her symptoms, but broadly she did not feel much subjective improvement. Her objective measurements as detailed below also support that. On their face, the mean reaction time and step lengths in response to multiple pulls during pull testing look improved, but this is really just a function of being pulled less intensely after the drain was removed (see Reaction time and step length vs. Pull intensity plots where all postLD pulls are on lower end of x axis resulting in biased mean plots). Her pull test plots also show a reversal of the normal pull test response with smaller steps for increasing pull intensity when there should be larger steps. Similarly, reaction time was either flat or increasing for increased pull intensity when there should be reduced reaction time. PT testing supports the quantitative kinematics with actually a slight degradation in her performance post-LD, similar to the quantitative gait testing.     She is thinking about doing the surgery anyway as she understands the risks/benefits. There are a minority of patients who fail the lumbar drain trial but still benefit from shunting though that is probably only about 20-30% of patients and we cannot predict who those patients are beforehand. She feels there aren't any alternatives besides slowly getting worse. Either way, she wants to get  her hernia repair done first likely. Has had abdominal surgery in past, left hernia and hysterectomy. No other meds.      Exam:  Awake, alert, oriented x 3  Attends, follows, fluent  PERRL  EOMI  FS  TML  BUE/BLE 5/5, no drift     Lumbar Drain Testing:  Quantitative Kinematics  Gait:                 Stride velocity (m/s)                Stride length (m)  PreLD              0.81                                         0.97  PostLD            0.64                                         0.80     Balance:                     PT:  MiniBEST: 23->21  2 min walk: 233.3 m -> 111.3 m  10 m walk:comfortable pace  0.92 m/s ->0.83 m/s;  fast pace 1.2 m/s->1.07 m/s     Neuropsych  Largely stable compared to baseline     A/P:  75 y/o woman with possible NPH. We discussed her results at length. Given the lack of improvement after lumbar drain, it is difficult to recommend moving forward with a shunt. We did discuss that a small percentage of patients with a negative lumbar drain trial do improve after  shunt, but generally we do not recommend moving forward with shunt as the risks would not outweigh the potential benefits. She and her family agree. For now, we will proceed with gait and balance PT and a return visit in 6 months for repeat quantitative testing.      - RTC in 6 months for repeat quantitative kinematics   - Refer for gait/balance PT in intervening time  - Defer further workup to Neurology team        Again, thank you for allowing me to participate in the care of your patient.      Sincerely,    Naveen Orona MD

## 2025-03-19 ENCOUNTER — THERAPY VISIT (OUTPATIENT)
Dept: PHYSICAL THERAPY | Facility: CLINIC | Age: 75
End: 2025-03-19
Attending: NEUROLOGICAL SURGERY
Payer: COMMERCIAL

## 2025-03-19 DIAGNOSIS — G91.2 NORMAL PRESSURE HYDROCEPHALUS (H): ICD-10-CM

## 2025-03-19 DIAGNOSIS — R26.89 BALANCE PROBLEM: ICD-10-CM

## 2025-03-19 PROCEDURE — 97110 THERAPEUTIC EXERCISES: CPT | Mod: GP

## 2025-03-19 PROCEDURE — 97112 NEUROMUSCULAR REEDUCATION: CPT | Mod: GP

## 2025-03-19 PROCEDURE — 97161 PT EVAL LOW COMPLEX 20 MIN: CPT | Mod: GP

## 2025-03-19 NOTE — PROGRESS NOTES
03/19/25 1300   Signing Clinician's Name / Credentials   Signing clinician's name / credentials Jayde Kerr, PT, DPT, NCS   Functional Gait Assessment (BRUNA Guan, MAURO Benjamin., et al. (2004))   1. GAIT LEVEL SURFACE 2   2. CHANGE IN GAIT SPEED 3   3. GAIT WITH HORIZONTAL HEAD TURNS 2   4. GAIT WITH VERTICAL HEAD TURNS 2   5. GAIT AND PIVOT TURN 2   6. STEP OVER OBSTACLE 2   7. GAIT WITH NARROW BASE OF SUPPORT 2   8. GAIT WITH EYES CLOSED 2   9. AMBULATING BACKWARDS 2   10. STEPS 2   Total Functional Gait Assessment Score   TOTAL SCORE: (MAXIMUM SCORE 30) 21       Functional Gait Assessment (FGA): The FGA assesses postural stability during various walking tasks.   Gait assistive device used: none      Patient Score: 21/30 - Pt at risk for falling with household/community ambulation.    Scores of <22/30 have been correlated with predicting falls in community-dwelling older adults according to Freida & Oc 2010.   Scores of <18/30 have been correlated with increased risk for falls in patients with Parkinsons Disease according to Nathaniel Fernandez Murdock et al 2014.  Minimal Detectable Change for patients with acute/chronic stroke = 4.2 according to Stacy & Ritschel 2009  Minimal Detectable Change for patients with vestibular disorder = 8 according to Freida & Oc 2010     Assessment (rationale for performing, application to patient s function & care plan): s/p NPH with demonstrated balance impairments, repeated as has demonstrated improved gait, balance and overall functional mobility since initial assessment.

## 2025-03-19 NOTE — PROGRESS NOTES
"PHYSICAL THERAPY EVALUATION  Type of Visit: Evaluation              Subjective         Presenting condition or subjective complaint: balance    Pt is a 75 y/o F, presenting to PT for evaluation of imbalance. She initially had a fall in 12/2022; resulting in a diagnosis of NPH. She reports initially have symptoms of dizziness that she was seen/treated for with vestibular therapy. She feels that her dizziness resolved, but now balance/gait impairment has surfaced. She'd like to initiate PT to address these impairments at this time. She was previously doing PT at [facility] with a focus on strengthening; however, has since been discharged. She feels like her walking is \"jerky,\" and uncoordinated at times. She also notices scuffing of her feet. She does exercise daily - combo of 1hr on TM at 2.2mph with B HR, standing UE/LE strengthening, stretching, and supine bridging/SLR. She is planning to proceed with conservative management of NPH at this time - MD notes are indicating she's not likely to have success and/or any change in function with the placement of a shunt/surgery. Of note, she does plan to have a hernia surgery somewhat soon. PMHx of L hip pain/degeneration and L knee pain 2' OA.     Date of onset: 03/17/25 (Order date.)      Relevant medical history: Arthritis; Cancer   Past Medical History:   Diagnosis Date    Arthritis     Colon cancer (H) 1989, 1990    Fibrocystic breast     James syndrome     Pneumonia      Dates & types of surgery: subtotal colectomy hysteroctomy others  Past Surgical History:   Procedure Laterality Date    ARTHROSCOPY SHOULDER ROTATOR CUFF REPAIR Right     COLON SURGERY      subtotal colectomy    HYSTERECTOMY      LAPAROSCOPIC HERNIORRHAPHY INGUINAL Left 09/17/2020    Procedure: LAPAROSCOPIC LEFT INGUINAL HERNIA REPAIR, POSSIBLE OPEN;  Surgeon: Donny Carrillo MD;  Location: [facility];  Service: General    PELVIC LAPAROSCOPY      endometriosis    SALPINGOOPHORECTOMY  "      Prior diagnostic imaging/testing results: MRI; CT scan; EMG   - see pt's EMR.    Prior therapy history for the same diagnosis, illness or injury: Yes 2023to current, she just recently discharged with [facility]. She confirms that she has no other ongoing plans of care.     Prior Level of Function  Pt IND with all transfers, ambulation, and ADLs/IADLs at baseline.     Living Environment  Social support: With a significant other or spouse   Type of home: House; 2-story; Basement   Stairs to enter the home: Yes 2 Is there a railing: No   Ramp: No   Stairs inside the home: Yes   Is there a railing: No   Help at home: None;  is present.  Equipment owned:   None  Employment: Yes   Hobbies/Interests: many    Patient goals for therapy: walking properly    Pain assessment: Pain present in the L hip and knee; requires her to use HR on stairs.     Objective      Cognitive Status Examination  Pt with intact cognition.     OBSERVATION: Pt ambulating with bilateral foot drop and lateral trunk sway with gait.    INTEGUMENTARY: Intact    POSTURE: Standing Posture: Rounded shoulders    RANGE OF MOTION:   LE ROM WFL  UE ROM WFL    STRENGTH:  Pt with 4/5 anterior tibialis strength bilaterally; also has 4/5 strength in the hip flexors and knee extensors bilaterally.    BED MOBILITY: Independent    TRANSFERS: Independent, PT observed pt using HR for stability with STS.    GAIT:   Level of Maryneal: Independent  Assistive Device(s): None  Gait Deviations: Keturah decreased  Drop foot L  Drop foot R  Lateral trunk sway; hip external rotation with toes pointed outward.  Gait Distance: >100ft during PT eval.  Stairs: Completed with U HR in reciprocal pattern.    BALANCE: Standing Balance (static):Good  Standing Balance (dynamic):Fair    SPECIAL TESTS  Functional Gait Assessment (FGA) TOTAL SCORE: (MAXIMUM SCORE 30): 21 - pt at risk for falling.   10 Meter Walk Test (Comfortable)  0.8m/s - pt at level of a community  ambulator; at risk for falling 2' speed < 1.0m/s.     Modified CTSIB Conditions (sec) Cond 1:  30s  Cond 2:  30s  Cond 4:  30s  Cond 5 :  30s (inc sway, but well controlled by patient)     SENSATION: UE Sensation WNL, LE Sensation WNL    Assessment & Plan   CLINICAL IMPRESSIONS  Medical Diagnosis: Normal pressure hydrocephalus; Balance problem    Treatment Diagnosis: impairment of balance with household/community activities   Impression/Assessment: Patient is a 74 year old female with impaired gait and mobility complaints.  The following significant findings have been identified: Pain, Decreased ROM/flexibility, Decreased strength, Impaired balance, Impaired gait, Impaired muscle performance, Decreased activity tolerance, Impaired posture, and Instability. These impairments interfere with their ability to perform household mobility and community mobility as compared to previous level of function.     Clinical Decision Making (Complexity):  Clinical Presentation: Stable/Uncomplicated  Clinical Presentation Rationale: based on medical and personal factors listed in PT evaluation  Clinical Decision Making (Complexity): Low complexity    PLAN OF CARE  Treatment Interventions:  Interventions: Gait Training, Manual Therapy, Neuromuscular Re-education, Therapeutic Activity, Therapeutic Exercise, Self-Care/Home Management    Long Term Goals     PT Goal 1  Goal Identifier: HEP  Goal Description: Pt will demonstrate IND with strength, balance, and muscular and aerobic endurance HEP, while working to improve capacity for functional mobility.  Goal Progress: Initiated at evaluation; see PTRx.  Target Date: 05/17/25  PT Goal 2  Goal Identifier: 5xSTS  Goal Description: Pt will complete x5 STS in <15s without use of B UEs, to demonstrate increased B LE strength and reduced risk for falling.  Goal Progress: assess at upcoming session  Target Date: 05/17/25  PT Goal 3  Goal Identifier: FGA  Goal Description: Pt will score >22/30pts  on FGA, to demonstrate improved dynamic balance and postural control with ambulation and decreased risk for falling with functional mobility.  Goal Progress: baseline: 21/30 on FGA - at risk for falling.  Target Date: 05/17/25  PT Goal 4  Goal Identifier: 10 MWT  Goal Description: Patient will achieve MDC for self-selected and fast gait speeds to show improved gait efficiency, karina, and mechanics to reduce risk for falling.  Goal Progress: assess at upcoming session  Target Date: 05/17/25      Frequency of Treatment: 1x/week  Duration of Treatment: 60 days    Recommended Referrals to Other Professionals:  None  Education Assessment:   Learner/Method: Patient;Listening;Demonstration;Pictures/Video;Reading;No Barriers to Learning    Risks and benefits of evaluation/treatment have been explained.   Patient/Family/caregiver agrees with Plan of Care.     Evaluation Time:     PT Eval, Low Complexity Minutes (41013): 22     Signing Clinician: Jayde Kerr, PT, DPT, NCS

## 2025-04-15 ENCOUNTER — TELEPHONE (OUTPATIENT)
Dept: SURGERY | Facility: CLINIC | Age: 75
End: 2025-04-15

## 2025-04-15 NOTE — TELEPHONE ENCOUNTER
I called patient to let her know there was a scheduling conflict with Dr. Carrillo's schedule on Thursday of this week; therefore, needing to move her surgery to Friday April 18th. Patient agreed to the new date. I sent an e-mail to [facility] asking them to change the date of surgery.

## 2025-04-15 NOTE — TELEPHONE ENCOUNTER
Patient left a  about some concerns she has going into her surgery on Friday. States that she is still concerned that the pain she is still experiencing is very similar to the pain on her R side. She is scheduled for R sd inguinal hernia repair with GIANFRANCO on 4/18/25 but wants to make sure that the L side does not also need to be repaired. GIANFRANCO is out until the date of her surgery but I will route to him for his input.    Purnima Muniz RN  [facility]  General Surgery  [address] [place] [address] [address] [place] [address]  Office:[phone]  Employed by [employer]

## 2025-04-18 ENCOUNTER — HOSPITAL ENCOUNTER (OUTPATIENT)
Facility: AMBULATORY SURGERY CENTER | Age: 75
Discharge: HOME | End: 2025-04-18
Attending: SPECIALIST
Payer: COMMERCIAL

## 2025-04-18 VITALS
DIASTOLIC BLOOD PRESSURE: 61 MMHG | BODY MASS INDEX: 24.04 KG/M2 | HEIGHT: 68 IN | RESPIRATION RATE: 16 BRPM | HEART RATE: 70 BPM | TEMPERATURE: 96.8 F | WEIGHT: 158.6 LBS | OXYGEN SATURATION: 99 % | SYSTOLIC BLOOD PRESSURE: 122 MMHG

## 2025-04-18 DIAGNOSIS — R10.31 RIGHT INGUINAL PAIN: Primary | ICD-10-CM

## 2025-04-18 PROCEDURE — 49505 PRP I/HERN INIT REDUC >5 YR: CPT | Mod: RT | Performed by: SPECIALIST

## 2025-04-18 RX ORDER — FENTANYL CITRATE 0.05 MG/ML
25 INJECTION, SOLUTION INTRAMUSCULAR; INTRAVENOUS
Status: DISCONTINUED | OUTPATIENT
Start: 2025-04-18 | End: 2025-04-19 | Stop reason: HOSPADM

## 2025-04-18 RX ORDER — CEFAZOLIN SODIUM 2 G/100ML
2 INJECTION, SOLUTION INTRAVENOUS
Status: COMPLETED | OUTPATIENT
Start: 2025-04-18 | End: 2025-04-18

## 2025-04-18 RX ORDER — LIDOCAINE 40 MG/G
CREAM TOPICAL
Status: DISCONTINUED | OUTPATIENT
Start: 2025-04-18 | End: 2025-04-19 | Stop reason: HOSPADM

## 2025-04-18 RX ORDER — CEFAZOLIN SODIUM 2 G/100ML
2 INJECTION, SOLUTION INTRAVENOUS SEE ADMIN INSTRUCTIONS
Status: DISCONTINUED | OUTPATIENT
Start: 2025-04-18 | End: 2025-04-19 | Stop reason: HOSPADM

## 2025-04-18 RX ORDER — ONDANSETRON 2 MG/ML
4 INJECTION INTRAMUSCULAR; INTRAVENOUS EVERY 30 MIN PRN
Status: DISCONTINUED | OUTPATIENT
Start: 2025-04-18 | End: 2025-04-19 | Stop reason: HOSPADM

## 2025-04-18 RX ORDER — ACETAMINOPHEN 325 MG/1
975 TABLET ORAL ONCE
Status: COMPLETED | OUTPATIENT
Start: 2025-04-18 | End: 2025-04-18

## 2025-04-18 RX ORDER — OXYCODONE HYDROCHLORIDE 5 MG/1
5 TABLET ORAL
Status: DISCONTINUED | OUTPATIENT
Start: 2025-04-18 | End: 2025-04-19 | Stop reason: HOSPADM

## 2025-04-18 RX ORDER — ONDANSETRON 4 MG/1
4 TABLET, ORALLY DISINTEGRATING ORAL EVERY 30 MIN PRN
Status: DISCONTINUED | OUTPATIENT
Start: 2025-04-18 | End: 2025-04-19 | Stop reason: HOSPADM

## 2025-04-18 RX ORDER — DEXAMETHASONE SODIUM PHOSPHATE 4 MG/ML
4 INJECTION, SOLUTION INTRA-ARTICULAR; INTRALESIONAL; INTRAMUSCULAR; INTRAVENOUS; SOFT TISSUE
Status: DISCONTINUED | OUTPATIENT
Start: 2025-04-18 | End: 2025-04-19 | Stop reason: HOSPADM

## 2025-04-18 RX ORDER — NALOXONE HYDROCHLORIDE 0.4 MG/ML
0.1 INJECTION, SOLUTION INTRAMUSCULAR; INTRAVENOUS; SUBCUTANEOUS
Status: DISCONTINUED | OUTPATIENT
Start: 2025-04-18 | End: 2025-04-19 | Stop reason: HOSPADM

## 2025-04-18 RX ORDER — SODIUM CHLORIDE, SODIUM LACTATE, POTASSIUM CHLORIDE, CALCIUM CHLORIDE 600; 310; 30; 20 MG/100ML; MG/100ML; MG/100ML; MG/100ML
INJECTION, SOLUTION INTRAVENOUS CONTINUOUS
Status: DISCONTINUED | OUTPATIENT
Start: 2025-04-18 | End: 2025-04-19 | Stop reason: HOSPADM

## 2025-04-18 RX ORDER — HYDROCODONE BITARTRATE AND ACETAMINOPHEN 5; 325 MG/1; MG/1
1 TABLET ORAL EVERY 6 HOURS PRN
Qty: 12 TABLET | Refills: 0 | Status: SHIPPED | OUTPATIENT
Start: 2025-04-18 | End: 2025-04-21

## 2025-04-18 RX ADMIN — SODIUM CHLORIDE, SODIUM LACTATE, POTASSIUM CHLORIDE, CALCIUM CHLORIDE: 600; 310; 30; 20 INJECTION, SOLUTION INTRAVENOUS at 09:34

## 2025-04-18 NOTE — DISCHARGE INSTRUCTIONS
If you have any questions or concerns regarding your procedure please contact Dr. Carrillo, his office number is [phone]    You received a medication called Toradol (a stronger IV ibuprofen) at 10:51. Do NOT take any Ibuprofen / Advil / Motrin / Aleve / Naproxen or products containing Ibuprofen until 4:51 pm or later.      Adult Discharge Instructions     For 24 hours after surgery    Get plenty of rest.  A responsible adult must stay with you for at least 24 hours after you leave the hospital.     Do not drive or use heavy equipment.  If you have weakness or tingling, don't drive or use heavy equipment until this feeling goes away.    Do not drink alcohol.    Avoid strenuous or risky activities.  Ask for help when climbing stairs.     You may feel lightheaded.  If so, sit for a few minutes before standing.  Have someone help you get up.     You may have a slight fever. Call the doctor if your fever is over 100 F (37.7 C) (taken under the tongue) or lasts longer than 24 hours.    You may have a dry mouth, a sore throat, muscle aches or trouble sleeping.  These should go away after 24 hours.    Do not make important or legal decisions.    Based on the surgery/procedure that you had today, we do not expect that you will have any problems.  However, we want you to know what to do if you have pain, nausea, bleeding, or infection:    To control pain:  Take medicines your physician has prescribed or or over-the counter medicine he or she advises.  Ice packs and periods of rest are often helpful.  For surgery on an arm or leg, raise it on a pillow to ease swelling.  If your pain is not managed with the above methods, contact your physician.    To control nausea:  Take anti-nausea medicine approved by your physician.  Drink clear liquids such as apple juice, ginger ale, broth or 7-Up. Be sure to drink enough fluids.  Move to a regular diet as you feel able.  Rest may also help.    Bleeding:  You may see a little blood  on your dressing, about the size of a quarter in the first 24 hours.  If you see this, there is no reason to be alarmed.  However, if this continues to increase in size, apply pressure if able, and notify your physician.    Infection: Please contact your physician if you have any of the following signs:  redness, swelling, heat, increasing pain or foul-smelling drainage at your surgery site, fever or chills.    Call your doctor for any of the followin.  It has been over 8 to 10 hours since surgery and you are still not able to urinate (pass water).    2.  Headache for over 24 hours.    3.  Numbness, tingling or weakness in your legs the day after surgery (if you had spinal anesthesia).

## 2025-04-18 NOTE — OP NOTE
Operative Note    Name:  Vidya Brink  PCP:  Luisana Garza  Procedure Date:  4/18/2025      Open right inguinal hernia repair with mesh    Pre-Procedure Diagnosis:  Right inguinal pain [R10.31]     Post-Procedure Diagnosis:    right inidirect inguinal hernia    Surgeon(s):  Donny Carrillo MD    [unfilled]    Anesthesia Type:  Not documented    Past Medical History:   Diagnosis Date    Arthritis     Colon cancer (H) 1989, 1990    Fibrocystic breast     James syndrome        Patient Active Problem List    Diagnosis Date Noted    Right inguinal pain 02/21/2025     Priority: Medium    NPH (normal pressure hydrocephalus) (H) 01/27/2025     Priority: Medium    Dizziness 07/14/2016     Priority: Medium    Vertigo 07/14/2016     Priority: Medium    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  The patient was taken to the operating room and placed in a supine position.  Monitored anesthesia control was instituted by the anesthesia staff. The operative room briefing was performed.  The patient was then prepped and draped in a sterile manner. Local anesthetic of 1% lidocaine and a quarter percent Marcaine were infiltrated into the right inguinal region.  A field block was also performed in the area. Incision made in the right inguinal region.  This was taken down through Tay's fascia to the external oblique fascia.  The internal to external ring was opened along the external oblique fascia.  This revealed the cord and the hernia sac.  The hernia sac was dissected away from the cord along with the cord lipoma.  These were under reduced internally.  A Perflex plug is placed in the defect suturing to the fascia with 2-0 Prolene suture.  An overlay mesh was sutured at the ilioinguinal ligament and inferiorly to the inguinal ligament with a 2-0 Prolene suture.  The superior margin was  tacked with 2-0 Prolene suture.  The reconstructed internal ring with the tails of the mesh and this was sutured also with 2-0 Prolene suture.  These tails were tucked under the external oblique fascia.  The femoral canal was closed with a  3-0 Vicryl suture.  It was irrigated out with normal saline solution and removed.  Tay's fascia was closed with a 3-0 Vicryl suture.  Incision was closed with a 4-0 Monocryl subcuticular stitch.  Steri-Strips and sterile dressings are applied.  Patient transferred to PACU in stable condition, all sponge and needle counts were correct.        Estimated Blood Loss:   5 ml    Specimens:    none       Drains:    none    Complications:    None    Donny Carrillo MD     Date: 4/18/2025  Time: 11:03 AM

## 2025-04-18 NOTE — INTERVAL H&P NOTE
I have reviewed the surgical (or preoperative) H&P that is linked to this encounter, and examined the patient. There are no significant changes.        Donny Carrillo MD  General Surgery [phone]  Vascular Surgery [phone]          Clinical Conditions Present on Arrival:  Clinically Significant Risk Factors Present on Admission

## 2025-04-27 NOTE — TELEPHONE ENCOUNTER
Left voice message for Vidya let her  know that Dr Rivero placed an order for new orthotics this morning.     show

## 2025-04-29 ENCOUNTER — TELEPHONE (OUTPATIENT)
Dept: SURGERY | Facility: CLINIC | Age: 75
End: 2025-04-29
Payer: COMMERCIAL

## 2025-04-29 NOTE — TELEPHONE ENCOUNTER
[facility] Post-Op Phone Call                  Surgeon: Donny Carrillo     Date of Surgery: 4/18/25  Surgery: Inguinal Herniorrhaphy   Discharge Date: 4/18/25    Date/Time Called:   Date: 4/29/2025 Time: 10:41 AM   Attempt: First    Pain Control:  Intensity: No Pain (0)  Duration/Location/Explain:   What makes it better/worse?     Medications:  Narcotic Use - No  Drug type: tylenol   Frequency: prn     Incisions:  Drainage? clean and dry  Any fever type symptoms? No  Comment:   Bruising: No  Explain expected bruising if not present  Swelling: No  Expected swelling, advised to use cold packs to the area as well as elevation as able      GI:  Nausea? No  Vomiting? No  BM? Yes  Gas? Yes  Voiding Frequency? 4 or more/day   Appetite? Good    Activity:  Walking activity? Yes- already walking on treadmill   Frequency/Type: as tolerated   Restrictions: No heavy lifting for 4 weeks     Return to Work Plans?  Expected date N/A   Do you need anything from us in this regard? No     Post-op appointment made? No      Thank you for your time. Please do not hesitate to call us with any questions or concerns.    Call completed by: Ange Warner RN

## 2025-05-26 ENCOUNTER — HOSPITAL ENCOUNTER (INPATIENT)
Facility: HOSPITAL | Age: 75
LOS: 3 days | Discharge: NURSING, REHAB OR LONG-TERM CARE | End: 2025-05-29
Attending: INTERNAL MEDICINE | Admitting: INTERNAL MEDICINE
Payer: COMMERCIAL

## 2025-05-26 PROBLEM — K85.90 ACUTE PANCREATITIS: Status: ACTIVE | Noted: 2025-05-26

## 2025-05-26 LAB
ALBUMIN SERPL BCG-MCNC: 3.4 G/DL (ref 3.5–5.2)
ALP SERPL-CCNC: 57 U/L (ref 40–150)
ALT SERPL W P-5'-P-CCNC: 10 U/L (ref 0–50)
ANION GAP SERPL CALCULATED.3IONS-SCNC: 8 MMOL/L (ref 7–15)
AST SERPL W P-5'-P-CCNC: 13 U/L (ref 0–45)
BILIRUB SERPL-MCNC: 1.5 MG/DL
BUN SERPL-MCNC: 36.3 MG/DL (ref 8–23)
CALCIUM SERPL-MCNC: 8.2 MG/DL (ref 8.8–10.4)
CHLORIDE SERPL-SCNC: 104 MMOL/L (ref 98–107)
CREAT SERPL-MCNC: 0.55 MG/DL (ref 0.51–0.95)
EGFRCR SERPLBLD CKD-EPI 2021: >90 ML/MIN/1.73M2
ERYTHROCYTE [DISTWIDTH] IN BLOOD BY AUTOMATED COUNT: 13 % (ref 10–15)
GLUCOSE SERPL-MCNC: 141 MG/DL (ref 70–99)
HCO3 SERPL-SCNC: 25 MMOL/L (ref 22–29)
HCT VFR BLD AUTO: 39 % (ref 35–47)
HGB BLD-MCNC: 12.5 G/DL (ref 11.7–15.7)
LACTATE SERPL-SCNC: 1 MMOL/L (ref 0.7–2)
LIPASE SERPL-CCNC: 132 U/L (ref 13–60)
MAGNESIUM SERPL-MCNC: 1.9 MG/DL (ref 1.7–2.3)
MCH RBC QN AUTO: 28.7 PG (ref 26.5–33)
MCHC RBC AUTO-ENTMCNC: 32.1 G/DL (ref 31.5–36.5)
MCV RBC AUTO: 89 FL (ref 78–100)
PHOSPHATE SERPL-MCNC: 3.1 MG/DL (ref 2.5–4.5)
PLATELET # BLD AUTO: 186 10E3/UL (ref 150–450)
POTASSIUM SERPL-SCNC: 4 MMOL/L (ref 3.4–5.3)
PROT SERPL-MCNC: 5.5 G/DL (ref 6.4–8.3)
RBC # BLD AUTO: 4.36 10E6/UL (ref 3.8–5.2)
SODIUM SERPL-SCNC: 137 MMOL/L (ref 135–145)
TRIGL SERPL-MCNC: 46 MG/DL
WBC # BLD AUTO: 11.6 10E3/UL (ref 4–11)

## 2025-05-26 PROCEDURE — 83735 ASSAY OF MAGNESIUM: CPT | Performed by: INTERNAL MEDICINE

## 2025-05-26 PROCEDURE — 85018 HEMOGLOBIN: CPT | Performed by: INTERNAL MEDICINE

## 2025-05-26 PROCEDURE — 85014 HEMATOCRIT: CPT | Performed by: INTERNAL MEDICINE

## 2025-05-26 PROCEDURE — 84478 ASSAY OF TRIGLYCERIDES: CPT | Performed by: INTERNAL MEDICINE

## 2025-05-26 PROCEDURE — 36415 COLL VENOUS BLD VENIPUNCTURE: CPT | Performed by: INTERNAL MEDICINE

## 2025-05-26 PROCEDURE — 82784 ASSAY IGA/IGD/IGG/IGM EACH: CPT | Performed by: INTERNAL MEDICINE

## 2025-05-26 PROCEDURE — 120N000001 HC R&B MED SURG/OB

## 2025-05-26 PROCEDURE — 84100 ASSAY OF PHOSPHORUS: CPT | Performed by: INTERNAL MEDICINE

## 2025-05-26 PROCEDURE — 83690 ASSAY OF LIPASE: CPT | Performed by: INTERNAL MEDICINE

## 2025-05-26 PROCEDURE — 86301 IMMUNOASSAY TUMOR CA 19-9: CPT | Performed by: INTERNAL MEDICINE

## 2025-05-26 PROCEDURE — 258N000003 HC RX IP 258 OP 636: Performed by: INTERNAL MEDICINE

## 2025-05-26 PROCEDURE — 83605 ASSAY OF LACTIC ACID: CPT | Performed by: INTERNAL MEDICINE

## 2025-05-26 PROCEDURE — 80053 COMPREHEN METABOLIC PANEL: CPT | Performed by: INTERNAL MEDICINE

## 2025-05-26 PROCEDURE — 82787 IGG 1 2 3 OR 4 EACH: CPT | Performed by: INTERNAL MEDICINE

## 2025-05-26 PROCEDURE — 99223 1ST HOSP IP/OBS HIGH 75: CPT | Performed by: INTERNAL MEDICINE

## 2025-05-26 PROCEDURE — 84155 ASSAY OF PROTEIN SERUM: CPT | Performed by: INTERNAL MEDICINE

## 2025-05-26 PROCEDURE — 250N000011 HC RX IP 250 OP 636: Performed by: INTERNAL MEDICINE

## 2025-05-26 RX ORDER — NALOXONE HYDROCHLORIDE 0.4 MG/ML
0.4 INJECTION, SOLUTION INTRAMUSCULAR; INTRAVENOUS; SUBCUTANEOUS
Status: DISCONTINUED | OUTPATIENT
Start: 2025-05-26 | End: 2025-05-29 | Stop reason: HOSPADM

## 2025-05-26 RX ORDER — ONDANSETRON 2 MG/ML
4 INJECTION INTRAMUSCULAR; INTRAVENOUS EVERY 6 HOURS PRN
Status: DISCONTINUED | OUTPATIENT
Start: 2025-05-26 | End: 2025-05-29 | Stop reason: HOSPADM

## 2025-05-26 RX ORDER — HYDROMORPHONE HCL IN WATER/PF 6 MG/30 ML
0.2 PATIENT CONTROLLED ANALGESIA SYRINGE INTRAVENOUS EVERY 4 HOURS PRN
Refills: 0 | Status: DISCONTINUED | OUTPATIENT
Start: 2025-05-26 | End: 2025-05-29 | Stop reason: HOSPADM

## 2025-05-26 RX ORDER — LIDOCAINE 40 MG/G
CREAM TOPICAL
Status: DISCONTINUED | OUTPATIENT
Start: 2025-05-26 | End: 2025-05-29 | Stop reason: HOSPADM

## 2025-05-26 RX ORDER — AMOXICILLIN 250 MG
1 CAPSULE ORAL 2 TIMES DAILY PRN
Status: DISCONTINUED | OUTPATIENT
Start: 2025-05-26 | End: 2025-05-29 | Stop reason: HOSPADM

## 2025-05-26 RX ORDER — CALCIUM CARBONATE/VITAMIN D3 600 MG-10
1 TABLET ORAL DAILY
COMMUNITY

## 2025-05-26 RX ORDER — NALOXONE HYDROCHLORIDE 0.4 MG/ML
0.2 INJECTION, SOLUTION INTRAMUSCULAR; INTRAVENOUS; SUBCUTANEOUS
Status: DISCONTINUED | OUTPATIENT
Start: 2025-05-26 | End: 2025-05-29 | Stop reason: HOSPADM

## 2025-05-26 RX ORDER — CALCIUM CARBONATE 500 MG/1
1000 TABLET, CHEWABLE ORAL 4 TIMES DAILY PRN
Status: DISCONTINUED | OUTPATIENT
Start: 2025-05-26 | End: 2025-05-29 | Stop reason: HOSPADM

## 2025-05-26 RX ORDER — SODIUM CHLORIDE, SODIUM LACTATE, POTASSIUM CHLORIDE, CALCIUM CHLORIDE 600; 310; 30; 20 MG/100ML; MG/100ML; MG/100ML; MG/100ML
INJECTION, SOLUTION INTRAVENOUS CONTINUOUS
Status: DISCONTINUED | OUTPATIENT
Start: 2025-05-26 | End: 2025-05-28

## 2025-05-26 RX ORDER — ONDANSETRON 4 MG/1
4 TABLET, ORALLY DISINTEGRATING ORAL EVERY 6 HOURS PRN
Status: DISCONTINUED | OUTPATIENT
Start: 2025-05-26 | End: 2025-05-29 | Stop reason: HOSPADM

## 2025-05-26 RX ORDER — AMOXICILLIN 250 MG
2 CAPSULE ORAL 2 TIMES DAILY PRN
Status: DISCONTINUED | OUTPATIENT
Start: 2025-05-26 | End: 2025-05-29 | Stop reason: HOSPADM

## 2025-05-26 RX ADMIN — PANTOPRAZOLE SODIUM 40 MG: 40 INJECTION, POWDER, FOR SOLUTION INTRAVENOUS at 19:49

## 2025-05-26 RX ADMIN — SODIUM CHLORIDE, SODIUM LACTATE, POTASSIUM CHLORIDE, AND CALCIUM CHLORIDE: .6; .31; .03; .02 INJECTION, SOLUTION INTRAVENOUS at 19:50

## 2025-05-26 ASSESSMENT — ACTIVITIES OF DAILY LIVING (ADL)
ADLS_ACUITY_SCORE: 40
ADLS_ACUITY_SCORE: 57
ADLS_ACUITY_SCORE: 40
ADLS_ACUITY_SCORE: 42
ADLS_ACUITY_SCORE: 54
ADLS_ACUITY_SCORE: 40

## 2025-05-26 NOTE — H&P
[facility]    History and Physical - Hospitalist Service       Date of Admission:  5/26/2025    Assessment & Plan      Vidya Brink is a 75 year old female admitted on 5/26/2025. She has hx of James syndrome , NPH, recent hernia repair with mesh 6 weeks ago, here from Urgent care with acute abd pain, c/w acute pancreatitis     1.Abd pain  -epigastric and tender on exam  -lipase was up  -ct done showed stranding by pancreas  -gb ultrasound normal  -LFT's at  ok  -no alcohol exposure  -no meds at risk with this  -with James syndrome, must be concerned with possible malignancy presentation(will check CA 19-9), check IgG4  -npo  -ivf LR  -pain control  -[facility] to see    2.Nausea  -Zofran prn  -iv ppi    3.Acute pain  -iv dilaudid  -pulse ox    4.James syndrome  -s/p subtotal colectomy  -hx of skin cancers  -son has James and had duodenal cancer and had a Whipple     5.NPH  -she is to have  shunt in June this summer at [place]  -fall precautions    6.s/p hernia surg  -s/p 6 weeks ago with gen surg here  had mesh  -pain is not in that area    7.Code-full     at bedside            Diet:  npo  DVT Prophylaxis: Pneumatic Compression Devices  Medina Catheter: Not present  Lines: None     Cardiac Monitoring: None  Code Status:  full    Clinically Significant Risk Factors Present on Admission                                        Disposition Plan     Medically Ready for Discharge: Anticipated in 2-4 Days           Perla Ayala MD  Hospitalist Service  [facility]  Securely message with Jour[employer] (more info)  Text page via [employer] Paging/Directory     ______________________________________________________________________    Chief Complaint   Abd pain    History is obtained from the patient    History of Present Illness   75 year old female admitted on 5/26/2025. She has hx of James syndrome , NPH, recent hernia repair with mesh 6 weeks ago, here from Urgent care with  acute abd pain, c/w acute pancreatitis   She notes she was ok yesterday. At dinner had fish, salmon, and went to bed. Woke at 0300 with abd pain, nausea and dry heaves  She thought it was related to what she ate   did not eat what she had and he was fine  She notes no loose stool,no fevers, no cp    She then had steady pain and went to Urgent Room and had ct and labs and was sent in for concern pancreatitis    She does not drink alcohol at all and does not smoke at all  She notes no new meds  She does not take many meds    Had hernia surg 6 weeks ago with gen surgery  Is due at [place] for  shunt 6/25    Follows with Dr.[facility]--has had many scopes due to James  Cancers she has had squamous cell skin cancers  S/p subtotal colectomy to be proactive for James        Past Medical History    Past Medical History:   Diagnosis Date    Arthritis     Colon cancer (H) 1989, 1990    Fibrocystic breast     James syndrome        Past Surgical History   Past Surgical History:   Procedure Laterality Date    ARTHROSCOPY SHOULDER ROTATOR CUFF REPAIR Right     COLON SURGERY      subtotal colectomy    HYSTERECTOMY      LAPAROSCOPIC HERNIORRHAPHY INGUINAL Left 09/17/2020    Procedure: LAPAROSCOPIC LEFT INGUINAL HERNIA REPAIR, POSSIBLE OPEN;  Surgeon: Donny Carrillo MD;  Location: [facility];  Service: General    PELVIC LAPAROSCOPY      endometriosis    SALPINGOOPHORECTOMY         Prior to Admission Medications   Prior to Admission Medications   Prescriptions Last Dose Informant Patient Reported? Taking?   CALCIUM-VITAMIN D PO   Yes No   Sig: Take 600 mg by mouth daily.   RISEdronate (ACTONEL) 35 MG tablet   Yes No   Sig: Take 35 mg by mouth every 7 days. Takes on Sundays   cholecalciferol (VITAMIN D3) 10 mcg (400 units) TABS tablet   Yes No   Sig: Take 20 mcg by mouth daily.   cyanocobalamin (VITAMIN B-12) 1000 MCG tablet   Yes No   Sig: Take 1,000 mcg by mouth daily.   ketoconazole (NIZORAL) 2 % external cream    "Yes No   Sig: Apply topically as needed.      Facility-Administered Medications: None        Review of Systems    CONSTITUTIONAL:  negative  EYES:  negative  HEENT:  negative  RESPIRATORY:  negative  CARDIOVASCULAR:  negative  GASTROINTESTINAL:  positive for nausea and abdominal pain  GENITOURINARY:  negative  INTEGUMENT/BREAST:  negative  HEMATOLOGIC/LYMPHATIC:  farris syndrome  ALLERGIC/IMMUNOLOGIC:  negative  ENDOCRINE: on bisphosphonate OP  MUSCULOSKELETAL:  negative  NEUROLOGICAL:  ataxia, to get shunt in 6/25 [place]  BEHAVIOR/PSYCH:  negative    Social History   I have reviewed this patient's social history and updated it with pertinent information if needed.  Social History     Tobacco Use    Smoking status: Never    Smokeless tobacco: Never   Substance Use Topics    Alcohol use: Not Currently    Drug use: Never         Family History   I have reviewed this patient's family history and updated it with pertinent information if needed.  Family History   Problem Relation Age of Onset    Ataxia Sister         \"Balance problems\"    Ataxia Brother         \"Balance problems\"    Breast Cancer Paternal Grandmother 48    Farris Syndrome Son         s/p Whipple         Allergies   Allergies   Allergen Reactions    Fosamax [Alendronate]      Joint aches        Physical Exam   Vital Signs:                    Weight: 0 lbs 0 oz    Constitutional: awake, fatigued, alert, cooperative, and no apparent distress  Eyes: sclera clear  ENT: normocephalic, without obvious abnormality  Respiratory: no increased work of breathing, good air exchange, no retractions, and clear to auscultation  Cardiovascular: regular rate and rhythm and normal S1 and S2  GI: normal bowel sounds, soft, non-distended, and tenderness noted in the epigastric region  Skin: no bruising or bleeding  Musculoskeletal: trace edema ankles  Neurologic: Mental Status Exam:  Level of Alertness:   awake  Attention/Concentration:  normal  Language:  normal  Motor Exam:  " moves all extremities well and symmetrically  Gait, is wide based   Neuropsychiatric: General: normal, calm, and normal eye contact    Medical Decision Making       75 MINUTES SPENT BY ME on the date of service doing chart review, history, exam, documentation & further activities per the note.      Labs pending here        Imaging results reviewed over the past 24 hrs:   Recent Results (from the past 24 hours)   US Abdomen Limited    Narrative    For Patients: As a result of the 21st Century Cures Act, medical imaging exams and procedure reports are released immediately into your electronic medical record. You may view this report before your referring provider. If you have questions, please contact your health care provider.    EXAM: US ABDOMEN LIMITED GALLBLADDER  LOCATION: [facility]  DATE: 5/26/2025    INDICATION: Abnormal liver function studies Abdomen pain Right upper quadrant pain  COMPARISON: CT abdomen pelvis 5/26/2025.  TECHNIQUE: Limited abdominal ultrasound.    FINDINGS:    GALLBLADDER: Mildly distended. No gallstones, gallbladder wall thickening, or pericholecystic fluid. Sonographic Milton's sign is negative.    BILE DUCTS: No biliary dilatation. The common duct measures 2 mm.    LIVER: Normal parenchyma with smooth contour. No focal mass. Main portal vein is patent with hepatopedal flow.    No visualized ascites.     Impression    1.  Normal gallbladder. No evidence of acute cholecystitis.    2.  No biliary ductal dilation.   CT Abdomen Pelvis w Contrast    Narrative    EXAM: CTA CHEST, CT ABDOMEN PELVIS W  LOCATION: [facility]  DATE: 5/26/2025    INDICATION: Chest pain, nonspecific elevated d-dimer. Abdominal pain.  COMPARISON: None.  TECHNIQUE: CT chest pulmonary angiogram and routine CT abdomen pelvis with IV contrast. Arterial phase through the chest and venous phase through the abdomen and pelvis. Multiplanar reformats and MIP reconstructions were performed. Dose reduction  techniques were used.   CONTRAST: IOPAMIDOL 300 MG/ML  ML BOTTLE: 100mL    FINDINGS:  ANGIOGRAM CHEST: Pulmonary arteries are normal caliber and negative for pulmonary emboli. Thoracic aorta is negative for dissection.    LUNGS AND PLEURA: Small pleural effusions.    MEDIASTINUM/AXILLAE: Normal.    CORONARY ARTERY CALCIFICATION: None.    HEPATOBILIARY: Normal.    PANCREAS: Fluid adjacent to the pancreas. No contained fluid collections.    SPLEEN: Normal.    ADRENAL GLANDS: Normal.    KIDNEYS/BLADDER: An air locule in the bladder is nonspecific.    BOWEL: Normal.    LYMPH NODES: Normal.    VASCULATURE: Normal.    PELVIC ORGANS: Hysterectomy. Descent of the pelvic floor.    OTHER: Small volume of fluid in the abdomen and pelvis including adjacent to the pancreas. Presacral stranding is present.    MUSCULOSKELETAL: Normal.    Impression    1.  No PE.  2.  There is fluid throughout the abdomen and pelvis including adjacent to the pancreas. This could be seen with pancreatitis and edema. No contained fluid collections.  3.  No bowel obstruction.  4.  Hysterectomy. Descent of the pelvic floor.   CTA Chest Redo Sternotomy Planning    Narrative    EXAM: CTA CHEST, CT ABDOMEN PELVIS W  LOCATION: [facility] [place]  DATE: 5/26/2025    INDICATION: Chest pain, nonspecific elevated d-dimer. Abdominal pain.  COMPARISON: None.  TECHNIQUE: CT chest pulmonary angiogram and routine CT abdomen pelvis with IV contrast. Arterial phase through the chest and venous phase through the abdomen and pelvis. Multiplanar reformats and MIP reconstructions were performed. Dose reduction techniques were used.   CONTRAST: IOPAMIDOL 300 MG/ML  ML BOTTLE: 100mL    FINDINGS:  ANGIOGRAM CHEST: Pulmonary arteries are normal caliber and negative for pulmonary emboli. Thoracic aorta is negative for dissection.    LUNGS AND PLEURA: Small pleural effusions.    MEDIASTINUM/AXILLAE: Normal.    CORONARY ARTERY CALCIFICATION:  None.    HEPATOBILIARY: Normal.    PANCREAS: Fluid adjacent to the pancreas. No contained fluid collections.    SPLEEN: Normal.    ADRENAL GLANDS: Normal.    KIDNEYS/BLADDER: An air locule in the bladder is nonspecific.    BOWEL: Normal.    LYMPH NODES: Normal.    VASCULATURE: Normal.    PELVIC ORGANS: Hysterectomy. Descent of the pelvic floor.    OTHER: Small volume of fluid in the abdomen and pelvis including adjacent to the pancreas. Presacral stranding is present.    MUSCULOSKELETAL: Normal.    Impression    1.  No PE.  2.  There is fluid throughout the abdomen and pelvis including adjacent to the pancreas. This could be seen with pancreatitis and edema. No contained fluid collections.  3.  No bowel obstruction.  4.  Hysterectomy. Descent of the pelvic floor.

## 2025-05-27 ENCOUNTER — APPOINTMENT (OUTPATIENT)
Dept: PHYSICAL THERAPY | Facility: HOSPITAL | Age: 75
End: 2025-05-27
Attending: INTERNAL MEDICINE
Payer: COMMERCIAL

## 2025-05-27 LAB
ALBUMIN SERPL BCG-MCNC: 3.2 G/DL (ref 3.5–5.2)
ALP SERPL-CCNC: 50 U/L (ref 40–150)
ALT SERPL W P-5'-P-CCNC: 9 U/L (ref 0–50)
ANION GAP SERPL CALCULATED.3IONS-SCNC: 5 MMOL/L (ref 7–15)
AST SERPL W P-5'-P-CCNC: 11 U/L (ref 0–45)
BILIRUB SERPL-MCNC: 2 MG/DL
BUN SERPL-MCNC: 24.4 MG/DL (ref 8–23)
CALCIUM SERPL-MCNC: 7.9 MG/DL (ref 8.8–10.4)
CANCER AG19-9 SERPL IA-ACNC: 9 U/ML
CHLORIDE SERPL-SCNC: 105 MMOL/L (ref 98–107)
CREAT SERPL-MCNC: 0.5 MG/DL (ref 0.51–0.95)
EGFRCR SERPLBLD CKD-EPI 2021: >90 ML/MIN/1.73M2
ERYTHROCYTE [DISTWIDTH] IN BLOOD BY AUTOMATED COUNT: 13 % (ref 10–15)
GLUCOSE SERPL-MCNC: 94 MG/DL (ref 70–99)
HCO3 SERPL-SCNC: 26 MMOL/L (ref 22–29)
HCT VFR BLD AUTO: 35.9 % (ref 35–47)
HGB BLD-MCNC: 11.5 G/DL (ref 11.7–15.7)
HOLD SPECIMEN: NORMAL
IGG SERPL-MCNC: 471 MG/DL (ref 610–1616)
IGG1 SER-MCNC: 234 MG/DL (ref 382–929)
IGG2 SER-MCNC: 187 MG/DL (ref 242–700)
IGG3 SER-MCNC: 31 MG/DL (ref 22–176)
IGG4 SER-MCNC: 27 MG/DL (ref 4–86)
LIPASE SERPL-CCNC: 47 U/L (ref 13–60)
MAGNESIUM SERPL-MCNC: 1.9 MG/DL (ref 1.7–2.3)
MCH RBC QN AUTO: 28.5 PG (ref 26.5–33)
MCHC RBC AUTO-ENTMCNC: 32 G/DL (ref 31.5–36.5)
MCV RBC AUTO: 89 FL (ref 78–100)
PHOSPHATE SERPL-MCNC: 1.9 MG/DL (ref 2.5–4.5)
PHOSPHATE SERPL-MCNC: 1.9 MG/DL (ref 2.5–4.5)
PLATELET # BLD AUTO: 158 10E3/UL (ref 150–450)
POTASSIUM SERPL-SCNC: 3.6 MMOL/L (ref 3.4–5.3)
PROT SERPL-MCNC: 5.1 G/DL (ref 6.4–8.3)
RBC # BLD AUTO: 4.04 10E6/UL (ref 3.8–5.2)
SODIUM SERPL-SCNC: 136 MMOL/L (ref 135–145)
WBC # BLD AUTO: 10.8 10E3/UL (ref 4–11)

## 2025-05-27 PROCEDURE — 36415 COLL VENOUS BLD VENIPUNCTURE: CPT | Performed by: INTERNAL MEDICINE

## 2025-05-27 PROCEDURE — 97116 GAIT TRAINING THERAPY: CPT | Mod: GP

## 2025-05-27 PROCEDURE — 97530 THERAPEUTIC ACTIVITIES: CPT | Mod: GP

## 2025-05-27 PROCEDURE — 258N000003 HC RX IP 258 OP 636: Performed by: INTERNAL MEDICINE

## 2025-05-27 PROCEDURE — 250N000009 HC RX 250: Performed by: INTERNAL MEDICINE

## 2025-05-27 PROCEDURE — 84100 ASSAY OF PHOSPHORUS: CPT | Performed by: INTERNAL MEDICINE

## 2025-05-27 PROCEDURE — 250N000011 HC RX IP 250 OP 636: Performed by: INTERNAL MEDICINE

## 2025-05-27 PROCEDURE — 97162 PT EVAL MOD COMPLEX 30 MIN: CPT | Mod: GP

## 2025-05-27 PROCEDURE — 83690 ASSAY OF LIPASE: CPT | Performed by: INTERNAL MEDICINE

## 2025-05-27 PROCEDURE — 83735 ASSAY OF MAGNESIUM: CPT | Performed by: INTERNAL MEDICINE

## 2025-05-27 PROCEDURE — 80053 COMPREHEN METABOLIC PANEL: CPT | Performed by: INTERNAL MEDICINE

## 2025-05-27 PROCEDURE — 120N000001 HC R&B MED SURG/OB

## 2025-05-27 PROCEDURE — 99232 SBSQ HOSP IP/OBS MODERATE 35: CPT | Performed by: INTERNAL MEDICINE

## 2025-05-27 PROCEDURE — 85027 COMPLETE CBC AUTOMATED: CPT | Performed by: INTERNAL MEDICINE

## 2025-05-27 PROCEDURE — 250N000013 HC RX MED GY IP 250 OP 250 PS 637: Performed by: INTERNAL MEDICINE

## 2025-05-27 RX ORDER — POTASSIUM CHLORIDE 1500 MG/1
20 TABLET, EXTENDED RELEASE ORAL ONCE
Status: DISCONTINUED | OUTPATIENT
Start: 2025-05-27 | End: 2025-05-27

## 2025-05-27 RX ORDER — ACETAMINOPHEN 325 MG/1
650 TABLET ORAL EVERY 4 HOURS PRN
Status: DISCONTINUED | OUTPATIENT
Start: 2025-05-27 | End: 2025-05-28

## 2025-05-27 RX ADMIN — SODIUM CHLORIDE, SODIUM LACTATE, POTASSIUM CHLORIDE, AND CALCIUM CHLORIDE: .6; .31; .03; .02 INJECTION, SOLUTION INTRAVENOUS at 06:07

## 2025-05-27 RX ADMIN — SODIUM PHOSPHATE, MONOBASIC, MONOHYDRATE AND SODIUM PHOSPHATE, DIBASIC, ANHYDROUS 15 MMOL: 142; 276 INJECTION, SOLUTION INTRAVENOUS at 08:58

## 2025-05-27 RX ADMIN — PANTOPRAZOLE SODIUM 40 MG: 40 INJECTION, POWDER, FOR SOLUTION INTRAVENOUS at 21:51

## 2025-05-27 RX ADMIN — POTASSIUM & SODIUM PHOSPHATES POWDER PACK 280-160-250 MG 2 PACKET: 280-160-250 PACK at 21:48

## 2025-05-27 ASSESSMENT — ACTIVITIES OF DAILY LIVING (ADL)
ADLS_ACUITY_SCORE: 42
ADLS_ACUITY_SCORE: 45
ADLS_ACUITY_SCORE: 42
ADLS_ACUITY_SCORE: 45
ADLS_ACUITY_SCORE: 42
ADLS_ACUITY_SCORE: 45
ADLS_ACUITY_SCORE: 42
ADLS_ACUITY_SCORE: 45
ADLS_ACUITY_SCORE: 45
ADLS_ACUITY_SCORE: 42
ADLS_ACUITY_SCORE: 45
ADLS_ACUITY_SCORE: 47
ADLS_ACUITY_SCORE: 42
ADLS_ACUITY_SCORE: 45
ADLS_ACUITY_SCORE: 42
ADLS_ACUITY_SCORE: 45

## 2025-05-27 NOTE — PROVIDER NOTIFICATION
Pt had daughter, Elba, on the phone. Elba had questions regarding lab results. She was asking if CBC would be redrawn. Related to change in hemoglobin.  Calcium level changed and bilirubin level increased. She wanted to know if labs are being rechecked or treated.     - text message to Dr. RAMIREZ @16:07  -Return phone call from Dr. RAMIREZ at 16:08. - Dicussed. He will review chart again. Orders will be placed if needed.     *Also updated Dr. RAMIREZ to assessment that pt sitting at edge of bed, but having a hard time holding self upright. PT already ordered and OT ordered.     - Daughter and patient updated.     Stephanie Ramirez RN-BC     [] : Resident

## 2025-05-27 NOTE — PROGRESS NOTES
05/27/25 1100   Appointment Info   Signing Clinician's Name / Credentials (PT) Alma Huff DPT   Living Environment   People in Home spouse   Current Living Arrangements house   Home Accessibility stairs to enter home;stairs within home   Number of Stairs, Main Entrance 2;3   Stair Railings, Main Entrance railings safe and in good condition   Number of Stairs, Within Home, Primary greater than 10 stairs  (3 story home- bedroom upstairs)   Stair Railings, Within Home, Primary railings safe and in good condition   Transportation Anticipated family or friend will provide;health plan transportation   Self-Care   Usual Activity Tolerance moderate   Current Activity Tolerance fair   Equipment Currently Used at Home none   Fall history within last six months no   General Information   Onset of Illness/Injury or Date of Surgery 05/26/25   Referring Physician Malcolm RAMIREZ MD   Patient/Family Therapy Goals Statement (PT) home vs tcu   Pertinent History of Current Problem (include personal factors and/or comorbidities that impact the POC) 75 year old female admitted on 5/26/2025. She has hx of James syndrome , NPH, recent hernia repair with mesh 6 weeks ago, here from Urgent care with acute abd pain, c/w acute pancreatitis   Existing Precautions/Restrictions fall   Strength (Manual Muscle Testing)   Strength (Manual Muscle Testing) Deficits observed during functional mobility   Bed Mobility   Bed Mobility supine-sit   Supine-Sit Levy (Bed Mobility) moderate assist (50% patient effort);1 person assist   Bed Mobility Limitations decreased ability to use arms for pushing/pulling;decreased ability to use legs for bridging/pushing;impaired ability to control trunk for mobility   Impairments Contributing to Impaired Bed Mobility decreased strength   Assistive Device (Bed Mobility) bed rails   Transfers   Transfers sit-stand transfer   Sit-Stand Transfer   Sit-Stand Levy (Transfers) contact guard   Assistive  Device (Sit-Stand Transfers) walker, front-wheeled   Gait/Stairs (Locomotion)   [place] Level (Gait) minimum assist (75% patient effort)   Assistive Device (Gait) walker, front-wheeled   Distance in Feet (Gait) 10'   Pattern (Gait) step-through   Deviations/Abnormal Patterns (Gait) (S)  festinating/shuffling   Clinical Impression   Criteria for Skilled Therapeutic Intervention Yes, treatment indicated   PT Diagnosis (PT) Impaired functional mobility   Influenced by the following impairments Weakness, fatigue, balance deficits   Functional limitations due to impairments Impaired transfers, gait, strength   Clinical Presentation (PT Evaluation Complexity) evolving   Clinical Presentation Rationale Presents as diagnosed   Clinical Decision Making (Complexity) moderate complexity   Planned Therapy Interventions (PT) balance training;bed mobility training;gait training;home exercise program;stair training;strengthening;transfer training   Risk & Benefits of therapy have been explained patient   PT Total Evaluation Time   PT [place], Moderate Complexity Minutes (52688) 10   Physical Therapy Goals   PT Frequency Daily   PT Predicted Duration/Target Date for Goal Attainment 06/02/25   PT Goals Bed Mobility;Transfers;Gait;Stairs   PT: Bed Mobility Independent;Supine to/from sit   PT: Transfers Modified independent;Sit to/from stand;Bed to/from chair;Assistive device   PT: Gait Supervision/stand-by assist;Rolling walker;100 feet   PT: Stairs Supervision/stand-by assist;2 stairs;3 stairs   Interventions   Interventions Quick Adds Therapeutic Activity;Gait Training   Therapeutic Activity   Therapeutic Activities: dynamic activities to improve functional performance Minutes (99382) 10   Symptoms Noted During/After Treatment Fatigue   Treatment Detail/Skilled Intervention Upon sitting eob, pt dem L lateral and posterior lean. Max cues for midline and UE support. Balance improved over time. Sit > stand, CGA with FWW. Pt amb into  restroom. Min A with toilet transfer and cues for use of grab bar. Pt managaed her own sylvia-cares and underwear. After ambulation, pt returned to bed. Sit > supine, SBA. Call light in hand.   Gait Training   Gait Training Minutes (41777) 15   Symptoms Noted During/After Treatment (Gait Training) fatigue   Treatment Detail/Skilled Intervention Increased time for ambulation due to very slow, shuffling gait. Pt req max cues for walker proximity. Needing min A with 180 degree turns. Taxing effort to amb today.   Distance in Feet 110'   [place] Level (Gait Training) minimum assist (75% patient effort)   Physical Assistance Level (Gait Training) verbal cues;1 person assist   Weight Bearing (Gait Training) weight-bearing as tolerated   Assistive Device (Gait Training) rolling walker   Gait Analysis Deviations decreased karina;decreased step length;decreased toe-to-floor clearance   Impairments (Gait Analysis/Training) strength decreased;motor control impaired   PT Discharge Planning   PT Plan progress transfers, amb as vinny, stairs, LE/balance ex   PT Discharge Recommendation (DC Rec) Transitional Care Facility   PT Rationale for DC Rec TCU vs home with homecare pending progress with amb and stairs.   PT Brief overview of current status Amb 110' with FWW and min A.   PT Total Distance Amb During Session (feet) 120   PT Equipment Needed at Discharge walker, rolling   Physical Therapy Time and Intention   Timed Code Treatment Minutes 25   Total Session Time (sum of timed and untimed services) 35     Alma Huff, PT, DPT  5/27/2025

## 2025-05-27 NOTE — PLAN OF CARE
Problem: Infection  Goal: Absence of Infection Signs and Symptoms  Outcome: Progressing   Goal Outcome Evaluation:       Pt alert and oriented x4. She reports on intermittent dry-heaves, she declines pain or nausea medication. Abdomen soft, non distended and tender. Afebrile.

## 2025-05-27 NOTE — PLAN OF CARE
Problem: Adult Inpatient Plan of Care  Goal: Optimal Comfort and Wellbeing  5/27/2025 1429 by Devante Domínguez RN  Outcome: Progressing  5/27/2025 1429 by Devante Domínguez RN  Outcome: Progressing  Intervention: Monitor Pain and Promote Comfort  Recent Flowsheet Documentation  Taken 5/27/2025 0900 by Devante Domínguez RN  Pain Management Interventions:   repositioned   relaxation techniques promoted  Intervention: Provide Person-Centered Care  Recent Flowsheet Documentation  Taken 5/27/2025 0850 by Devante Domínguez RN  Trust Relationship/Rapport:   care explained   choices provided     Problem: Pancreatitis  Goal: Fluid and Electrolyte Balance  Outcome: Progressing  Goal: Optimal Pain Control and Function  Outcome: Progressing  Intervention: Monitor and Manage Pain  Recent Flowsheet Documentation  Taken 5/27/2025 0900 by Devante Domínguez RN  Pain Management Interventions:   repositioned   relaxation techniques promoted  Goal: Effective Oxygenation and Ventilation  Intervention: Optimize Oxygenation and Ventilation  Recent Flowsheet Documentation  Taken 5/27/2025 0850 by Devante Domínguez RN  Cough And Deep Breathing: done independently per patient  Activity Management: activity adjusted per tolerance  Head of Bed (HOB) Positioning: HOB at 20-30 degrees   Goal Outcome Evaluation:    Patient alert and oriented.  Abdominal pain improving per patient.  Uncomfortable in the bed.  Up in chair at this time.  Tolerating clear liquid diet.  No nausea or vomiting.  Assist of 1 with ambulation.

## 2025-05-27 NOTE — PROGRESS NOTES
[facility]    Medicine Progress Note - Hospitalist Service    Date of Admission:  5/26/2025    Assessment & Plan   Vidya Brink is a 75 year old female with past medical history of James syndrome, NPH, recent hernia repair with mesh 6 weeks prior to admission who presented to urgent care for evaluation of acute onset abdominal pain.  CT imaging concerning for pancreatitis and sent to [facility].    Acute upper abdominal pain;  CT imaging concern for acute pancreatitis and edema;  -- RUQ US reported normal gallbladder, no biliary duct dilatation.  --No history of alcohol use.  --Patient with history of James syndrome.  --Currently n.p.o., IV fluid, pain management.  --GI consulted, awaiting input    Hypophosphatemia, replace per protocol.    Recent hernia repair, patient denied any concerns  History of James syndrome, status post subtotal colectomy  History of NPH, follow-up with [facility] as scheduled.          Diet: NPO for Medical/Clinical Reasons Except for: Meds, Ice Chips    DVT Prophylaxis: Pneumatic Compression Devices and Ambulate every shift  Medina Catheter: Not present  Lines: None     Cardiac Monitoring: ACTIVE order. Indication: pancreatitis  Code Status: Full Code      Clinically Significant Risk Factors Present on Admission           # Hypocalcemia: Lowest Ca = 7.9 mg/dL in last 2 days, will monitor and replace as appropriate     # Hypoalbuminemia: Lowest albumin = 3.2 g/dL at 5/27/2025  6:46 AM, will monitor as appropriate                          Social Drivers of Health            Disposition Plan     Medically Ready for Discharge: Anticipated Tomorrow             Malcolm RAMIREZ MD  Hospitalist Service  [facility]  Securely message with [employer] (more info)  Text page via [employer] [address] Paging/Directory   ______________________________________________________________________    Interval History   Patient is new to me.  Patient seen and examined.   Notes, labs, imaging report personally reviewed.  Patient still reports ongoing abdominal pain, mostly on upper abdomen, on left side more painful.  Denied associated nausea.  Denied fever or chills.  Discussed with nursing staffs.    Physical Exam   Vital Signs: Temp: 100.1  F (37.8  C) Temp src: Oral BP: 102/53 Pulse: 75   Resp: 18 SpO2: 96 % O2 Device: None (Room air)    Weight: 166 lbs 7.16 oz      General: Not in obvious distress.  HEENT: Normocephalic, supple neck  Chest: Clear to auscultation bilateral anteriorly, no wheezing  Heart: S1S2 normal, regular  Abdomen: Soft.  Tenderness on upper abdomen, no rebound or guarding  Extremities: No swelling appreciated  Neuro: alert and awake, follows simple commands appropriately, moves all extremity        Medical Decision Making             Data     I have personally reviewed the following data over the past 24 hrs:    10.8  \   11.5 (L)   / 158     136 105 24.4 (H) /  94   3.6 26 0.50 (L) \     ALT: 9 AST: 11 AP: 50 TBILI: 2.0 (H)   ALB: 3.2 (L) TOT PROTEIN: 5.1 (L) LIPASE: 47     Procal: N/A CRP: N/A Lactic Acid: 1.0         Imaging results reviewed over the past 24 hrs:   Recent Results (from the past 24 hours)   US Abdomen Limited    Narrative    For Patients: As a result of the 21st Century Cures Act, medical imaging exams and procedure reports are released immediately into your electronic medical record. You may view this report before your referring provider. If you have questions, please contact your health care provider.    EXAM: US ABDOMEN LIMITED GALLBLADDER  LOCATION: [facility] [place]  DATE: 5/26/2025    INDICATION: Abnormal liver function studies Abdomen pain Right upper quadrant pain  COMPARISON: CT abdomen pelvis 5/26/2025.  TECHNIQUE: Limited abdominal ultrasound.    FINDINGS:    GALLBLADDER: Mildly distended. No gallstones, gallbladder wall thickening, or pericholecystic fluid. Sonographic Milton's sign is negative.    BILE DUCTS: No biliary  dilatation. The common duct measures 2 mm.    LIVER: Normal parenchyma with smooth contour. No focal mass. Main portal vein is patent with hepatopedal flow.    No visualized ascites.     Impression    1.  Normal gallbladder. No evidence of acute cholecystitis.    2.  No biliary ductal dilation.   CT Abdomen Pelvis w Contrast    Narrative    EXAM: CTA CHEST, CT ABDOMEN PELVIS W  LOCATION: [facility] [place]  DATE: 5/26/2025    INDICATION: Chest pain, nonspecific elevated d-dimer. Abdominal pain.  COMPARISON: None.  TECHNIQUE: CT chest pulmonary angiogram and routine CT abdomen pelvis with IV contrast. Arterial phase through the chest and venous phase through the abdomen and pelvis. Multiplanar reformats and MIP reconstructions were performed. Dose reduction techniques were used.   CONTRAST: IOPAMIDOL 300 MG/ML  ML BOTTLE: 100mL    FINDINGS:  ANGIOGRAM CHEST: Pulmonary arteries are normal caliber and negative for pulmonary emboli. Thoracic aorta is negative for dissection.    LUNGS AND PLEURA: Small pleural effusions.    MEDIASTINUM/AXILLAE: Normal.    CORONARY ARTERY CALCIFICATION: None.    HEPATOBILIARY: Normal.    PANCREAS: Fluid adjacent to the pancreas. No contained fluid collections.    SPLEEN: Normal.    ADRENAL GLANDS: Normal.    KIDNEYS/BLADDER: An air locule in the bladder is nonspecific.    BOWEL: Normal.    LYMPH NODES: Normal.    VASCULATURE: Normal.    PELVIC ORGANS: Hysterectomy. Descent of the pelvic floor.    OTHER: Small volume of fluid in the abdomen and pelvis including adjacent to the pancreas. Presacral stranding is present.    MUSCULOSKELETAL: Normal.    Impression    1.  No PE.  2.  There is fluid throughout the abdomen and pelvis including adjacent to the pancreas. This could be seen with pancreatitis and edema. No contained fluid collections.  3.  No bowel obstruction.  4.  Hysterectomy. Descent of the pelvic floor.   CTA Chest Redo Sternotomy Planning    Narrative    EXAM: CTA  CHEST, CT ABDOMEN PELVIS W  LOCATION: [facility] [place]  DATE: 5/26/2025    INDICATION: Chest pain, nonspecific elevated d-dimer. Abdominal pain.  COMPARISON: None.  TECHNIQUE: CT chest pulmonary angiogram and routine CT abdomen pelvis with IV contrast. Arterial phase through the chest and venous phase through the abdomen and pelvis. Multiplanar reformats and MIP reconstructions were performed. Dose reduction techniques were used.   CONTRAST: IOPAMIDOL 300 MG/ML  ML BOTTLE: 100mL    FINDINGS:  ANGIOGRAM CHEST: Pulmonary arteries are normal caliber and negative for pulmonary emboli. Thoracic aorta is negative for dissection.    LUNGS AND PLEURA: Small pleural effusions.    MEDIASTINUM/AXILLAE: Normal.    CORONARY ARTERY CALCIFICATION: None.    HEPATOBILIARY: Normal.    PANCREAS: Fluid adjacent to the pancreas. No contained fluid collections.    SPLEEN: Normal.    ADRENAL GLANDS: Normal.    KIDNEYS/BLADDER: An air locule in the bladder is nonspecific.    BOWEL: Normal.    LYMPH NODES: Normal.    VASCULATURE: Normal.    PELVIC ORGANS: Hysterectomy. Descent of the pelvic floor.    OTHER: Small volume of fluid in the abdomen and pelvis including adjacent to the pancreas. Presacral stranding is present.    MUSCULOSKELETAL: Normal.    Impression    1.  No PE.  2.  There is fluid throughout the abdomen and pelvis including adjacent to the pancreas. This could be seen with pancreatitis and edema. No contained fluid collections.  3.  No bowel obstruction.  4.  Hysterectomy. Descent of the pelvic floor.

## 2025-05-27 NOTE — CONSULTS
[facility] Consult       Name: Vidya Brink    Medical Record #: [id number]    YOB: 1950    Date/Time: 5/27/2025/10:10 AM    Reason for Consultation: Malcolm RAMIREZ MD has asked me to evaluate Vidya Brink regarding acute pancreatitis.    HPI: Patient is a 75-year-old female with history of James syndrome, history of colon cancer status post subtotal colectomy, NPH, recent hernia repair with mesh 6 weeks prior to admission, presented with upper abdominal pain with some associated nausea that began on Sunday.  CT was suggestive of pancreatitis with fluid in the abdomen colluding adjacent to the pancreas, initial lipase was elevated at 132.  Bilirubin is minimally elevated with otherwise normal LFTs.  Triglycerides and calcium are not elevated.  She denies previous history of pancreatitis.  There is a family history of a pancreatic or duodenal cancer in her son.  She follows typically with Dr. Vega [facility] for sigmoidoscopies for history of James.  Her last sigmoidoscopy was in March 2025 and revealed anastomosis at 35 cm with normal anastomosis and neoterminal ileum.  She had an EUS in December 2023 with Dr. Vasquez for history of James and family history of pancreas or duodenal cancer.  This was unremarkable.  She reports significant improvement in pain.  She is on ice chips.  She reports some left-sided abdominal pain with deep breaths.    Patient Active Problem List   Diagnosis    Dizziness    Vertigo    EPCAM-related James syndrome (HNPCC8)    NPH (normal pressure hydrocephalus) (H)    Right inguinal pain    Acute pancreatitis          Review of Systems (ROS): Pertinent items are noted in HPI.    Past Medical History:  Past Medical History:   Diagnosis Date    Arthritis     Colon cancer (H) 1989, 1990    Fibrocystic breast     James syndrome        Medications:   No current outpatient medications on file.       Allergies: Fosamax [alendronate]    Family History:  Family History  "  Problem Relation Age of Onset    Ataxia Sister         \"Balance problems\"    Ataxia Brother         \"Balance problems\"    Breast Cancer Paternal Grandmother 48    James Syndrome Son         s/p Justaipple       Social History:  Social History     Socioeconomic History    Marital status:      Spouse name: Not on file    Number of children: Not on file    Years of education: Not on file    Highest education level: Not on file   Occupational History    Not on file   Tobacco Use    Smoking status: Never    Smokeless tobacco: Never   Substance and Sexual Activity    Alcohol use: Not Currently    Drug use: Never    Sexual activity: Not on file   Other Topics Concern    Not on file   Social History Narrative    Not on file     Social Drivers of Health     Financial Resource Strain: Low Risk  (5/26/2025)    Financial Resource Strain     Within the past 12 months, have you or your family members you live with been unable to get utilities (heat, electricity) when it was really needed?: No   Food Insecurity: Low Risk  (5/26/2025)    Food Insecurity     Within the past 12 months, did you worry that your food would run out before you got money to buy more?: No     Within the past 12 months, did the food you bought just not last and you didn t have money to get more?: No   Transportation Needs: Low Risk  (5/26/2025)    Transportation Needs     Within the past 12 months, has lack of transportation kept you from medical appointments, getting your medicines, non-medical meetings or appointments, work, or from getting things that you need?: No   Physical Activity: Sufficiently Active (2/5/2025)    Received from [facility]    Exercise Vital Sign     Days of Exercise per Week: 7 days     Minutes of Exercise per Session: 60 min   Stress: Not on file   Social Connections: Not on file   Interpersonal Safety: Low Risk  (5/26/2025)    Interpersonal Safety     Do you feel physically and emotionally safe where you currently live?: Yes "     Within the past 12 months, have you been hit, slapped, kicked or otherwise physically hurt by someone?: No     Within the past 12 months, have you been humiliated or emotionally abused in other ways by your partner or ex-partner?: No   Housing Stability: Low Risk  (5/26/2025)    Housing Stability     Do you have housing? : Yes     Are you worried about losing your housing?: No       PHYSICAL EXAMINATION:  /55 (BP Location: Right arm)   Pulse 75   Temp 98.7  F (37.1  C) (Oral)   Resp 19   Wt 75.5 kg (166 lb 7.2 oz)   SpO2 96%   BMI 25.31 kg/m   Body mass index is 25.31 kg/m .  General:  NAD  Gastrointestinal: Soft, mild left upper quadrant tenderness to palpation, no rebound no guarding      I have reviewed recent laboratory studies:    LABORATORY DATA:  Recent Labs   Lab 05/27/25  0646 05/26/25 1907   WBC 10.8 11.6*   RBC 4.04 4.36   HGB 11.5* 12.5   HCT 35.9 39.0   MCV 89 89   MCH 28.5 28.7   MCHC 32.0 32.1   RDW 13.0 13.0    186      Recent Labs   Lab 05/27/25  0646 05/26/25  1907    137   CO2 26 25   BUN 24.4* 36.3*     Recent Labs   Lab 05/27/25  0646 05/26/25  1907   ALKPHOS 50 57   AST 11 13   ALT 9 10     Lab Results   Component Value Date    INR 0.99 01/27/2025       Radiology:   CTA Chest Redo Sternotomy Planning  Order: [id number]  Impression    1.  No PE.  2.  There is fluid throughout the abdomen and pelvis including adjacent to the pancreas. This could be seen with pancreatitis and edema. No contained fluid collections.  3.  No bowel obstruction.  4.  Hysterectomy. Descent of the pelvic floor.  Narrative    EXAM: CTA CHEST, CT ABDOMEN PELVIS W  LOCATION: [facility] [place]  DATE: 5/26/2025    INDICATION: Chest pain, nonspecific elevated d-dimer. Abdominal pain.  COMPARISON: None.  TECHNIQUE: CT chest pulmonary angiogram and routine CT abdomen pelvis with IV contrast. Arterial phase through the chest and venous phase through the abdomen and pelvis. Multiplanar reformats  and MIP reconstructions were performed. Dose reduction techniques were used.  CONTRAST: IOPAMIDOL 300 MG/ML  ML BOTTLE: 100mL    FINDINGS:  ANGIOGRAM CHEST: Pulmonary arteries are normal caliber and negative for pulmonary emboli. Thoracic aorta is negative for dissection.    LUNGS AND PLEURA: Small pleural effusions.    MEDIASTINUM/AXILLAE: Normal.    CORONARY ARTERY CALCIFICATION: None.    HEPATOBILIARY: Normal.    PANCREAS: Fluid adjacent to the pancreas. No contained fluid collections.    SPLEEN: Normal.    ADRENAL GLANDS: Normal.    KIDNEYS/BLADDER: An air locule in the bladder is nonspecific.    BOWEL: Normal.    LYMPH NODES: Normal.    VASCULATURE: Normal.    PELVIC ORGANS: Hysterectomy. Descent of the pelvic floor.    OTHER: Small volume of fluid in the abdomen and pelvis including adjacent to the pancreas. Presacral stranding is present.    CT Abdomen Pelvis w Contrast  Order: [id number]  Impression    1.  No PE.  2.  There is fluid throughout the abdomen and pelvis including adjacent to the pancreas. This could be seen with pancreatitis and edema. No contained fluid collections.  3.  No bowel obstruction.  4.  Hysterectomy. Descent of the pelvic floor.  Narrative    EXAM: CTA CHEST, CT ABDOMEN PELVIS W  LOCATION: [facility] [place]  DATE: 5/26/2025    INDICATION: Chest pain, nonspecific elevated d-dimer. Abdominal pain.  COMPARISON: None.  TECHNIQUE: CT chest pulmonary angiogram and routine CT abdomen pelvis with IV contrast. Arterial phase through the chest and venous phase through the abdomen and pelvis. Multiplanar reformats and MIP reconstructions were performed. Dose reduction techniques were used.  CONTRAST: IOPAMIDOL 300 MG/ML  ML BOTTLE: 100mL    FINDINGS:  ANGIOGRAM CHEST: Pulmonary arteries are normal caliber and negative for pulmonary emboli. Thoracic aorta is negative for dissection.    LUNGS AND PLEURA: Small pleural effusions.    MEDIASTINUM/AXILLAE: Normal.    CORONARY ARTERY  CALCIFICATION: None.    HEPATOBILIARY: Normal.    PANCREAS: Fluid adjacent to the pancreas. No contained fluid collections.    SPLEEN: Normal.    ADRENAL GLANDS: Normal.    KIDNEYS/BLADDER: An air locule in the bladder is nonspecific.    BOWEL: Normal.    LYMPH NODES: Normal.    VASCULATURE: Normal.    PELVIC ORGANS: Hysterectomy. Descent of the pelvic floor.    OTHER: Small volume of fluid in the abdomen and pelvis including adjacent to the pancreas. Presacral stranding is present.    US Abdomen Limited  Order: [id number]  Impression      1.  Normal gallbladder. No evidence of acute cholecystitis.    2.  No biliary ductal dilation.  Narrative    For Patients: As a result of the 21st Century Cures Act, medical imaging exams and procedure reports are released immediately into your electronic medical record. You may view this report before your referring provider. If you have questions, please contact your health care provider.    EXAM: US ABDOMEN LIMITED GALLBLADDER  LOCATION: [facility] [place]  DATE: 5/26/2025    INDICATION: Abnormal liver function studies Abdomen pain Right upper quadrant pain  COMPARISON: CT abdomen pelvis 5/26/2025.  TECHNIQUE: Limited abdominal ultrasound.    FINDINGS:    GALLBLADDER: Mildly distended. No gallstones, gallbladder wall thickening, or pericholecystic fluid. Sonographic Milton's sign is negative.    BILE DUCTS: No biliary dilatation. The common duct measures 2 mm.    LIVER: Normal parenchyma with smooth contour. No focal mass. Main portal vein is patent with hepatopedal flow.    No visualized ascites.    Impression:   Acute pancreatitis -unclear etiology with unremarkable ultrasound, LFTs, calcium, triglycerides, no alcohol history.  Symptomatically improved.  History of James  -her last sigmoidoscopy was earlier this year, her last EGD was in 2021    Recommendation:   Clear liquid diet, advance to low-fat diet as tolerated  Outpatient EGD and EUS in 4 to 6 weeks    Will sign  off.  Call with questions  45 minutes of total time was spent providing patient care, including patient evaluation, reviewing documentation/tests, and .    Marga Castillo MD  5/27/2025/10:10 AM  [facility]

## 2025-05-27 NOTE — PHARMACY-ADMISSION MEDICATION HISTORY
Pharmacist Admission Medication History    Admission medication history is complete. The information provided in this note is only as accurate as the sources available at the time of the update.    Information Source(s): Patient via in-person    Pertinent Information: Patient was recently in [place], and missed a couple of doses of her medications. Risedronate was taken yesterday - 5/25/25.     Changes made to PTA medication list:  Added: None  Deleted: Vitamin D   Changed: None    Allergies reviewed with patient and updates made in EHR: yes    Medication History Completed By: MERON WARNER RPH 5/26/2025 7:24 PM    PTA Med List   Medication Sig Last Dose/Taking    calcium carbonate-vitamin D (CALTRATE) 600-10 MG-MCG per tablet Take 1 tablet by mouth daily. Past Week Noon    cyanocobalamin (VITAMIN B-12) 1000 MCG tablet Take 1,000 mcg by mouth daily. Past Week Morning    ketoconazole (NIZORAL) 2 % external cream Apply topically as needed. To feet 5/25/2025 Evening    RISEdronate (ACTONEL) 35 MG tablet Take 35 mg by mouth every 7 days. Takes on Sundays 5/25/2025 Morning

## 2025-05-27 NOTE — PLAN OF CARE
Problem: Adult Inpatient Plan of Care  Goal: Optimal Comfort and Wellbeing  Outcome: Progressing  Intervention: Monitor Pain and Promote Comfort  Recent Flowsheet Documentation  Taken 5/27/2025 0132 by Zoya Leahy, RN  Pain Management Interventions: rest  Intervention: Provide Person-Centered Care  Recent Flowsheet Documentation  Taken 5/27/2025 0132 by Zoya Leahy, RN  Trust Relationship/Rapport:   care explained   choices provided   Goal Outcome Evaluation:       Pt complains of 3/10 pain, in AM no pain. Awakenings minimized. Pt denies nausea. Assist x1. Tele NS. NPO except meds and ice.

## 2025-05-28 ENCOUNTER — APPOINTMENT (OUTPATIENT)
Dept: PHYSICAL THERAPY | Facility: HOSPITAL | Age: 75
End: 2025-05-28
Attending: INTERNAL MEDICINE
Payer: COMMERCIAL

## 2025-05-28 ENCOUNTER — APPOINTMENT (OUTPATIENT)
Dept: OCCUPATIONAL THERAPY | Facility: HOSPITAL | Age: 75
End: 2025-05-28
Attending: INTERNAL MEDICINE
Payer: COMMERCIAL

## 2025-05-28 LAB
ALBUMIN SERPL BCG-MCNC: 2.9 G/DL (ref 3.5–5.2)
ALP SERPL-CCNC: 52 U/L (ref 40–150)
ALT SERPL W P-5'-P-CCNC: 14 U/L (ref 0–50)
ANION GAP SERPL CALCULATED.3IONS-SCNC: 8 MMOL/L (ref 7–15)
AST SERPL W P-5'-P-CCNC: 21 U/L (ref 0–45)
BILIRUB DIRECT SERPL-MCNC: 0.56 MG/DL (ref 0–0.3)
BILIRUB SERPL-MCNC: 2.3 MG/DL
BILIRUB SERPL-MCNC: 2.4 MG/DL
BUN SERPL-MCNC: 9.8 MG/DL (ref 8–23)
CALCIUM SERPL-MCNC: 8.1 MG/DL (ref 8.8–10.4)
CHLORIDE SERPL-SCNC: 107 MMOL/L (ref 98–107)
CREAT SERPL-MCNC: 0.5 MG/DL (ref 0.51–0.95)
EGFRCR SERPLBLD CKD-EPI 2021: >90 ML/MIN/1.73M2
GLUCOSE SERPL-MCNC: 95 MG/DL (ref 70–99)
HCO3 SERPL-SCNC: 25 MMOL/L (ref 22–29)
HGB BLD-MCNC: 11 G/DL (ref 11.7–15.7)
HOLD SPECIMEN: NORMAL
MAGNESIUM SERPL-MCNC: 2 MG/DL (ref 1.7–2.3)
MCV RBC AUTO: 90 FL (ref 78–100)
MCV RBC AUTO: 90 FL (ref 78–100)
PHOSPHATE SERPL-MCNC: 1.9 MG/DL (ref 2.5–4.5)
PHOSPHATE SERPL-MCNC: 2.4 MG/DL (ref 2.5–4.5)
PLATELET # BLD AUTO: 147 10E3/UL (ref 150–450)
POTASSIUM SERPL-SCNC: 3.5 MMOL/L (ref 3.4–5.3)
PROT SERPL-MCNC: 5 G/DL (ref 6.4–8.3)
SODIUM SERPL-SCNC: 140 MMOL/L (ref 135–145)

## 2025-05-28 PROCEDURE — 84100 ASSAY OF PHOSPHORUS: CPT | Performed by: INTERNAL MEDICINE

## 2025-05-28 PROCEDURE — 36415 COLL VENOUS BLD VENIPUNCTURE: CPT | Performed by: INTERNAL MEDICINE

## 2025-05-28 PROCEDURE — 97535 SELF CARE MNGMENT TRAINING: CPT | Mod: GO

## 2025-05-28 PROCEDURE — 82248 BILIRUBIN DIRECT: CPT | Performed by: INTERNAL MEDICINE

## 2025-05-28 PROCEDURE — 97116 GAIT TRAINING THERAPY: CPT | Mod: GP

## 2025-05-28 PROCEDURE — 97530 THERAPEUTIC ACTIVITIES: CPT | Mod: GP

## 2025-05-28 PROCEDURE — 97165 OT EVAL LOW COMPLEX 30 MIN: CPT | Mod: GO

## 2025-05-28 PROCEDURE — 120N000001 HC R&B MED SURG/OB

## 2025-05-28 PROCEDURE — 99232 SBSQ HOSP IP/OBS MODERATE 35: CPT | Performed by: INTERNAL MEDICINE

## 2025-05-28 PROCEDURE — 85049 AUTOMATED PLATELET COUNT: CPT | Performed by: INTERNAL MEDICINE

## 2025-05-28 PROCEDURE — 84155 ASSAY OF PROTEIN SERUM: CPT | Performed by: INTERNAL MEDICINE

## 2025-05-28 PROCEDURE — 85018 HEMOGLOBIN: CPT | Performed by: INTERNAL MEDICINE

## 2025-05-28 PROCEDURE — 250N000013 HC RX MED GY IP 250 OP 250 PS 637: Performed by: INTERNAL MEDICINE

## 2025-05-28 PROCEDURE — 80053 COMPREHEN METABOLIC PANEL: CPT | Performed by: INTERNAL MEDICINE

## 2025-05-28 PROCEDURE — 83735 ASSAY OF MAGNESIUM: CPT | Performed by: INTERNAL MEDICINE

## 2025-05-28 RX ORDER — ACETAMINOPHEN 325 MG/1
650 TABLET ORAL EVERY 6 HOURS PRN
Status: DISCONTINUED | OUTPATIENT
Start: 2025-05-28 | End: 2025-05-29 | Stop reason: HOSPADM

## 2025-05-28 RX ORDER — POTASSIUM CHLORIDE 750 MG/1
10 TABLET, EXTENDED RELEASE ORAL ONCE
Status: COMPLETED | OUTPATIENT
Start: 2025-05-28 | End: 2025-05-28

## 2025-05-28 RX ADMIN — ACETAMINOPHEN 325 MG: 325 TABLET ORAL at 14:54

## 2025-05-28 RX ADMIN — POTASSIUM & SODIUM PHOSPHATES POWDER PACK 280-160-250 MG 2 PACKET: 280-160-250 PACK at 16:40

## 2025-05-28 RX ADMIN — POTASSIUM CHLORIDE 10 MEQ: 750 TABLET, EXTENDED RELEASE ORAL at 11:28

## 2025-05-28 RX ADMIN — POTASSIUM & SODIUM PHOSPHATES POWDER PACK 280-160-250 MG 2 PACKET: 280-160-250 PACK at 10:15

## 2025-05-28 RX ADMIN — POTASSIUM & SODIUM PHOSPHATES POWDER PACK 280-160-250 MG 2 PACKET: 280-160-250 PACK at 00:11

## 2025-05-28 RX ADMIN — POTASSIUM & SODIUM PHOSPHATES POWDER PACK 280-160-250 MG 1 PACKET: 280-160-250 PACK at 23:21

## 2025-05-28 RX ADMIN — POTASSIUM & SODIUM PHOSPHATES POWDER PACK 280-160-250 MG 2 PACKET: 280-160-250 PACK at 13:24

## 2025-05-28 RX ADMIN — POTASSIUM & SODIUM PHOSPHATES POWDER PACK 280-160-250 MG 2 PACKET: 280-160-250 PACK at 04:05

## 2025-05-28 ASSESSMENT — ACTIVITIES OF DAILY LIVING (ADL)
ADLS_ACUITY_SCORE: 47
ADLS_ACUITY_SCORE: 47
ADLS_ACUITY_SCORE: 51
ADLS_ACUITY_SCORE: 47
ADLS_ACUITY_SCORE: 47
ADLS_ACUITY_SCORE: 51
ADLS_ACUITY_SCORE: 48
ADLS_ACUITY_SCORE: 47
ADLS_ACUITY_SCORE: 49
ADLS_ACUITY_SCORE: 49
ADLS_ACUITY_SCORE: 48
ADLS_ACUITY_SCORE: 47
ADLS_ACUITY_SCORE: 48
ADLS_ACUITY_SCORE: 49
DEPENDENT_IADLS:: INDEPENDENT
ADLS_ACUITY_SCORE: 47
ADLS_ACUITY_SCORE: 48
ADLS_ACUITY_SCORE: 51
ADLS_ACUITY_SCORE: 47

## 2025-05-28 NOTE — PLAN OF CARE
Goal Outcome Evaluation:         CM will continue to monitor progression of care, review team recommendations and provide discharge planning assist as needed.

## 2025-05-28 NOTE — PLAN OF CARE
"  Problem: Adult Inpatient Plan of Care  Goal: Absence of Hospital-Acquired Illness or Injury  Intervention: Identify and Manage Fall Risk  Recent Flowsheet Documentation  Taken 5/28/2025 0800 by Blossom Nguyen RN  Safety Promotion/Fall Prevention:   activity supervised   assistive device/personal items within reach   clutter free environment maintained   increased rounding and observation   lighting adjusted   mobility aid in reach   nonskid shoes/slippers when out of bed     Problem: Pancreatitis  Goal: Fluid and Electrolyte Balance  Outcome: Progressing   Goal Outcome Evaluation:         Patient pleasant. Up to bathroom to void with 1 assist and use of  walker plus gait belt. Patient did complain of slight pain on her left flank area but refused any pain medications. \"It hurts about a 2, it comes and go.\" Will continue to offer pain medications. Patient visiting with her  and talking frequently to her daughter Elba  per phone who is a nurse in another state. Will continue with current plan of care.                    "

## 2025-05-28 NOTE — CONSULTS
Care Management Initial Consult    General Information  Assessment completed with: Patient, Spouse or significant other,    Type of CM/SW Visit: Initial Assessment    Primary Care Provider verified and updated as needed: Yes   Readmission within the last 30 days: no previous admission in last 30 days      Reason for Consult: discharge planning  Advance Care Planning: Advance Care Planning Reviewed: no concerns identified          Communication Assessment  Patient's communication style: spoken language (English or Bilingual)    Hearing Difficulty or Deaf: yes   Wear Glasses or Blind: yes    Cognitive  Cognitive/Neuro/Behavioral: WDL                      Living Environment:   People in home: spouse     Current living Arrangements: house      Able to return to prior arrangements: other (see comments) (TBD)       Family/Social Support:  Care provided by: self  Provides care for: no one  Marital Status:   Support system: , Children          Description of Support System: Supportive, Involved         Current Resources:   Patient receiving home care services: No        Community Resources: None  Equipment currently used at home: none  Supplies currently used at home:      Employment/Financial:  Employment Status:          Financial Concerns: none           Does the patient's insurance plan have a 3 day qualifying hospital stay waiver?  No    Lifestyle & Psychosocial Needs:  Social Drivers of Health     Food Insecurity: Low Risk  (5/26/2025)    Food Insecurity     Within the past 12 months, did you worry that your food would run out before you got money to buy more?: No     Within the past 12 months, did the food you bought just not last and you didn t have money to get more?: No   Depression: Not at risk (2/4/2025)    PHQ-2     PHQ-2 Score: 0   Housing Stability: Low Risk  (5/26/2025)    Housing Stability     Do you have housing? : Yes     Are you worried about losing your housing?: No   Tobacco Use: Low Risk   (5/26/2025)    [facility]    Patient History     Smoking Tobacco Use: Never     Smokeless Tobacco Use: Never     Passive Exposure: Not on file   Financial Resource Strain: Low Risk  (5/26/2025)    Financial Resource Strain     Within the past 12 months, have you or your family members you live with been unable to get utilities (heat, electricity) when it was really needed?: No   Alcohol Use: Not on file   Transportation Needs: Low Risk  (5/26/2025)    Transportation Needs     Within the past 12 months, has lack of transportation kept you from medical appointments, getting your medicines, non-medical meetings or appointments, work, or from getting things that you need?: No   Physical Activity: Sufficiently Active (2/5/2025)    Received from [facility]    Exercise Vital Sign     Days of Exercise per Week: 7 days     Minutes of Exercise per Session: 60 min   Interpersonal Safety: Low Risk  (5/26/2025)    Interpersonal Safety     Do you feel physically and emotionally safe where you currently live?: Yes     Within the past 12 months, have you been hit, slapped, kicked or otherwise physically hurt by someone?: No     Within the past 12 months, have you been humiliated or emotionally abused in other ways by your partner or ex-partner?: No   Stress: Not on file   Social Connections: Not on file   Health Literacy: Not on file       Functional Status:  Prior to admission patient needed assistance:   Dependent ADLs:: Independent  Dependent IADLs:: Independent       Mental Health Status:          Chemical Dependency Status:                Values/Beliefs:  Spiritual, Cultural Beliefs, Worship Practices, Values that affect care:                 Discussed  Partnership in Safe Discharge Planning  document with patient/family: No    Additional Information:  Met with patient to review role of care management, progression of care and possible need for services at discharge,  including OP services, home care, or skilled nursing care. Patient alert, oriented and engaged in the conversation. Writer then verified patient demographics and updated any changes needed in the patient chart.  Patient comes from home with her spouse. She is independent with Adl/iadls  Her plan is to return home. Initial therapy session recommended TCU. Patient feels as if she is stronger. She will consider TCU or homecare if recommended  CM gave them the TCU list and explained the medicare.gov website.  Next Steps: follow for therapy recommendations    Alexia Mccarthy RNCC

## 2025-05-28 NOTE — PLAN OF CARE
Pt OOB with assist of one and use of walker and gait belt.  Incontinent of bowel and bladder due to urgency.  Denies nausea.  Abdomen distended, denies pain.    Hazel Jaime RN

## 2025-05-28 NOTE — PROGRESS NOTES
Patients left hand IV infiltrated. Fluids stopped per doctors order and Iv removed. Hand swollen. No pain noted. Hand elevated up on a pillow. Will continue to monitor as well as continue current plan of care.

## 2025-05-28 NOTE — SIGNIFICANT EVENT
Received call at 2041 from pt's daughter that her mother was on the floor and could not reach call light for help. Staff rushed into room to find pt on the floor, half under bed. Pt states she did not hit her head. Primary nurse notified. Vital signs obtained. Pt beto lifted off floor. Resident here to assess pt at 2105.

## 2025-05-28 NOTE — PROGRESS NOTES
05/28/25 1045   Appointment Info   Signing Clinician's Name / Credentials (OT) Paige Frommelt OTD OTR/L   Living Environment   People in Home spouse   Current Living Arrangements house   Home Accessibility stairs to enter home;stairs within home   Number of Stairs, Main Entrance 2;3   Stair Railings, Main Entrance railings safe and in good condition   Number of Stairs, Within Home, Primary greater than 10 stairs   Living Environment Comments Able to stay between two levels if needed - bedroom and shower on upper level, tub/shower and standard toilets   Self-Care   Usual Activity Tolerance good   Current Activity Tolerance fair   Regular Exercise Yes   Activity/Exercise Type walking   Exercise Amount/Frequency daily   Equipment Currently Used at Home none   Activity/Exercise/Self-Care Comment Typically ind with all ADLs and IADLs - walks daily at gym, does not use AD at baseline   General Information   Onset of Illness/Injury or Date of Surgery 05/26/25   Referring Physician Malcolm RAMIREZ MD   Patient/Family Therapy Goal Statement (OT) To return home, to get stronger   Additional Occupational Profile Info/Pertinent History of Current Problem Vidya Brink is a 75 year old female with past medical history of James syndrome, NPH, recent hernia repair with mesh 6 weeks prior to admission who presented to urgent care for evaluation of acute onset abdominal pain.  CT imaging concerning for pancreatitis and sent to [facility].   Existing Precautions/Restrictions fall   Limitations/Impairments safety/cognitive   Cognitive Status Examination   Orientation Status orientation to person, place and time   Affect/Mental Status (Cognitive) WFL   Safety Deficit awareness of need for assistance;problem-solving   Cognitive Status Comments Pt demos min confusion throughout session, easily redirected, only apparent at time - continue to monitor throughout hospital stay   Visual Perception   Visual Impairment/Limitations WFL    Posture   Posture not impaired   Range of Motion Comprehensive   General Range of Motion bilateral upper extremity ROM WNL   Strength Comprehensive (MMT)   Comment, General Manual Muscle Testing (MMT) Assessment Min generalized weakness noted   Bed Mobility   Bed Mobility supine-sit   Supine-Sit Carson (Bed Mobility) contact guard   Assistive Device (Bed Mobility) bed rails   Transfers   Transfers sit-stand transfer;toilet transfer;shower transfer   Sit-Stand Transfer   Sit-Stand Carson (Transfers) contact guard   Assistive Device (Sit-Stand Transfers) walker, front-wheeled   Shower Transfer   Carson Level (Shower Transfer) not tested   Shower Transfer Comments Tub/shower -  per clinical judgement mod A   Toilet Transfer   Carson Level (Toilet Transfer) minimum assist (75% patient effort)   Balance   Balance Assessment standing dynamic balance   Standing Balance: Dynamic minimal assist   Activities of Daily Living   BADL Assessment/Intervention lower body dressing;toileting;grooming   Lower Body Dressing Assessment/Training   Carson Level (Lower Body Dressing) contact guard assist   Grooming Assessment/Training   Carson Level (Grooming) contact guard assist   Toileting   Carson Level (Toileting) contact guard assist   Clinical Impression   Criteria for Skilled Therapeutic Interventions Met (OT) Yes, treatment indicated   OT Diagnosis Decreased ind with ADLs and safety   Influenced by the following impairments Acute pancreatitis   OT Problem List-Impairments impacting ADL activity tolerance impaired;balance;cognition;strength;mobility;coordination   Assessment of Occupational Performance 3-5 Performance Deficits   Identified Performance Deficits dressing, toileting, bathing, home mgmt, fxl transfers, cognition   Planned Therapy Interventions (OT) ADL retraining;balance training;IADL retraining;cognition;transfer training;progressive activity/exercise;risk factor  education;home program guidelines;strengthening   Clinical Decision Making Complexity (OT) problem focused assessment/low complexity   Risk & Benefits of therapy have been explained evaluation/treatment results reviewed;care plan/treatment goals reviewed;risks/benefits reviewed;current/potential barriers reviewed;patient;spouse/significant other   OT Total Evaluation Time   OT Eval, Low Complexity Minutes (25358) 8   OT Goals   Therapy Frequency (OT) Daily   OT Predicted Duration/Target Date for Goal Attainment 06/04/25   OT Goals Hygiene/Grooming;Toilet Transfer/Toileting;Cognition;Aerobic Activity   OT: Hygiene/Grooming modified independent;while standing   OT: Toilet Transfer/Toileting Modified independent;toilet transfer;cleaning and garment management   OT: Cognitive Patient/caregiver will verbalize understanding of cognitive assessment results/recommendations as needed for safe discharge planning   OT: Perform aerobic activity with stable cardiovascular response intermittent activity;10 minutes   Self-Care/Home Management   Self-Care/Home Mgmt/ADL, Compensatory, Meal Prep Minutes (52507) 15   Symptoms Noted During/After Treatment (Meal Preparation/Planning Training) fatigue   Treatment Detail/Skilled Intervention Eval completed, treatment initiated. Fxl mob to bathroom CGA-min A for balance, pt requires continuous cueing for staying close to walker - demos some shuffling gait, min A for navigating tight spaces with walker. G/h standing at sink ~ 4 minutes CGA, cueing for stepping inside walker and closer to sink for increased balance. Toilet transfer min A, cueing for hand placement. Set up for doffing/donning new brief. CGA for pericares and garment mgmt. Returned to other side of bed CGA. Trialed 180 turn to chair without walker as pt not following cueing for use - hand hold assist for turn to bedside chair. Left with call light in reach, alarm on.   OT Discharge Planning   OT Plan Progress transfers and  walker safety, toileting, dressing, cognitive testing, UE strengthening, activity tolerance   OT Discharge Recommendation (DC Rec) Transitional Care Facility   OT Rationale for DC Rec Recommend TCU at this time d/t requiring Ax 1 for all transfers and ADLs - motivated to return home as able pending progress   OT Brief overview of current status CGA-min A fxl mob and transfers, max cueing for walker safety, CGA standing ADLs   OT Total Distance Amb During Session (feet) 15   Total Session Time   Timed Code Treatment Minutes 15   Total Session Time (sum of timed and untimed services) 23

## 2025-05-28 NOTE — SIGNIFICANT EVENT
Significant Event Note    Time of event: 9:25 PM May 27, 2025    Description of event:  Paged by RN regarding fall. Patient was at bedside and partially slid down to the floor. She was seen at bedside saying she did not sustain any injuries. She did not hit her head. She has been dealing with ongoing progressive weakness and was not able to hold herself up. Apparently has been working with neurology with history of NPH and progressive weakness. She is not on any anticoagulation.     On exam, head appears atraumatic. Pupils equal with EOMs intact. Back without  signs of trauma. Patient able to sit up in bed and move all extremities equally. Sensation intact. No gross neuro deficits.     Plan:  - Routine cares  - No need for imaging at this time with no head/spine trauma and no blood thinners    Discussed with: bedside nurse    Timothy Monroy MD

## 2025-05-28 NOTE — PLAN OF CARE
Problem: Adult Inpatient Plan of Care  Goal: Plan of Care Review  Description: The Plan of Care Review/Shift note should be completed every shift.  The Outcome Evaluation is a brief statement about your assessment that the patient is improving, declining, or no change.  This information will be displayed automatically on your shift  note.  Outcome: Progressing     Problem: Infection  Goal: Absence of Infection Signs and Symptoms  Outcome: Progressing     Problem: Pancreatitis  Goal: Fluid and Electrolyte Balance  Outcome: Progressing     Problem: Pancreatitis  Goal: Optimal Pain Control and Function  Outcome: Progressing   Goal Outcome Evaluation:       Denies abdominal pain. Tolerating full liquid diet. PIV removed due to leaking, ok to leave out per MD. Ambulated in hallways, Ax1.

## 2025-05-28 NOTE — PLAN OF CARE
Goal Outcome Evaluation:       Ambulated with assist x2 belt and walker. Tolerated about 150 ft before fatiguing. Shuffle gait with difficulty moving right foot. Increased difficulty making turns. Beginning of shift, pt also struggling with trunk control. Sitting at edge bed and she couldn't maintain and upright position.  After ambulating, pt sat in folding chair and had no further problems with trunk control.   - Denies abdominal pain. Denies any problematic symptoms.  Tolerated clear liquid diet.   - Pt tried to transfer self from chair to bed and had an unwitnessed fall. No injuries noted. Did not hit head. Pt denied any pain/stiffness/soreness throughout the remainder of the shift. Educated to inform staff if this changes.   - Discharge pending if going home vs TCU.       Problem: Pancreatitis  Goal: Fluid and Electrolyte Balance  Outcome: Progressing  Goal: Absence of Infection Signs and Symptoms  Outcome: Progressing  Goal: Optimal Nutrition Delivery  Outcome: Progressing  Goal: Optimal Pain Control and Function  Outcome: Progressing  Goal: Effective Oxygenation and Ventilation  Outcome: Progressing  Intervention: Optimize Oxygenation and Ventilation  Recent Flowsheet Documentation  Taken 5/27/2025 1710 by Stephanie Ramirez, RN  Activity Management:   ambulated to bathroom   up in chair  Taken 5/27/2025 1645 by Stephanie Ramirez, RN  Activity Management: ambulated outside room  Taken 5/27/2025 1630 by Stephanie Ramirez RN  Cough And Deep Breathing: done with encouragement  Taken 5/27/2025 1530 by Stephanie Ramirez, RN  Activity Management: (difficulty with trunk control while in bed) sitting, edge of bed     Problem: Fall Injury Risk  Goal: Absence of Fall and Fall-Related Injury  Outcome: Progressing  Intervention: Identify and Manage Contributors  Recent Flowsheet Documentation  Taken 5/27/2025 1630 by Stephanie Ramirez, RN  Medication Review/Management:   medications reviewed   high-risk medications  identified  Intervention: Promote Injury-Free Environment  Recent Flowsheet Documentation  Taken 5/27/2025 1630 by Stephanie Ramirez, RN  Safety Promotion/Fall Prevention:   activity supervised   assistive device/personal items within reach   clutter free environment maintained   nonskid shoes/slippers when out of bed   room near nurse's station   room organization consistent   safety round/check completed   treat reversible contributory factors   treat underlying cause

## 2025-05-28 NOTE — PROGRESS NOTES
[facility]    Medicine Progress Note - Hospitalist Service    Date of Admission:  5/26/2025    Assessment & Plan   Vidya Brink is a 75 year old female with past medical history of James syndrome, NPH, recent hernia repair with mesh 6 weeks prior to admission who presented to urgent care for evaluation of acute onset abdominal pain.  CT imaging concerning for pancreatitis and sent to [facility].    Acute upper abdominal pain;  CT imaging concern for acute pancreatitis and edema;  -- RUQ US reported normal gallbladder, no biliary duct dilatation.  --No history of alcohol use.  --Patient with history of James syndrome.  -- Advance to full liquid diet, if tolerates advance to low-fat diet tomorrow.  - I personally discussed with gastroenterologist, discussed about isolated hyperbilirubinemia, which seems chronic.  Reported patient will be further evaluated as an outpatient with EGD and EUS in 4 to 6 weeks.    Hypophosphatemia, replace per protocol.    Mild anemia and thrombocytopenia, likely due to hemodilution as patient received continuous IV fluid for above issues.  Now oral intake improving, discontinued IV fluid.  Recheck labs in a.m.    Recent hernia repair, patient denied any concerns    History of James syndrome, status post subtotal colectomy    History of NPH, follow-up with [facility] as scheduled.  Neurology/neurosurgery note also reported history of right foot drop due to L5 chronic radiculopathy  PT OT following    Mild drop in immunoglobulin levels likely due to acute illness.  Recheck through PCP as an outpatient once he recovers from pancreatitis, if persistently low than recommend further workup through PCP    Unwitnessed partially slid down to floor from chair.  Patient denied injuries or any new concerns.  Appreciated house officer input          Diet: Full Liquid Diet    DVT Prophylaxis: Pneumatic Compression Devices, Ambulate every shift, and hold on  pharmacological agent given thrombocytopenia  Medina Catheter: Not present  Lines: None     Cardiac Monitoring: None  Code Status: Full Code      Clinically Significant Risk Factors               # Hypoalbuminemia: Lowest albumin = 2.9 g/dL at 5/28/2025  7:46 AM, will monitor as appropriate                    # Financial/Environmental Concerns: none         Social Drivers of Health            Disposition Plan     Medically Ready for Discharge: Anticipated Tomorrow             Malcolm RAMIREZ MD  Hospitalist Service  [facility]  Securely message with [employer] (more info)  Text page via [employer] Paging/Directory   ______________________________________________________________________    Interval History   Patient seen and examined.  Notes, labs, imaging report personally reviewed.  Overnight events reviewed, patient reported she partially slid down to the floor from her chair.  Patient was on a fall precaution.  Discussed with overnight patient's nurse and also overnight charge nurse, reported patient was on a visitor chair and that did not have fall precaution alert.  This morning patient tolerated clear liquid diet without any symptoms.  Mild upper abdominal discomfort but improving.  No nausea.  Complaining of generalized weakness but no new focal weakness.  Discussed with patient's daughter in detail on the phone at bedside, answer all her questions to best of my knowledge.  Discussed with gastroenterologist.    Physical Exam   Vital Signs: Temp: 99  F (37.2  C) Temp src: Oral BP: 105/56 Pulse: 80   Resp: 16 SpO2: 97 % O2 Device: None (Room air)    Weight: 160 lbs 11.2 oz      General: Not in obvious distress.  HEENT: Normocephalic, supple neck  Chest: Clear to auscultation bilateral anteriorly, no wheezing  Heart: S1S2 normal, regular  Abdomen: Soft.  Mild tenderness on upper abdomen, no rebound or guarding  Extremities: Did not appreciate significant swelling on lower extremities  Neuro: alert and  awake, follows simple commands appropriately, moves all extremity        Medical Decision Making             Data     I have personally reviewed the following data over the past 24 hrs:    N/A  \   11.0 (L)   / 147 (L)     140 107 9.8 /  95   3.5 25 0.50 (L) \     ALT: 14 AST: 21 AP: 52 TBILI: 2.3 (H); 2.4 (H)   ALB: 2.9 (L) TOT PROTEIN: 5.0 (L) LIPASE: N/A       Imaging results reviewed over the past 24 hrs:   No results found for this or any previous visit (from the past 24 hours).

## 2025-05-29 ENCOUNTER — LAB REQUISITION (OUTPATIENT)
Dept: LAB | Facility: CLINIC | Age: 75
End: 2025-05-29
Payer: COMMERCIAL

## 2025-05-29 ENCOUNTER — APPOINTMENT (OUTPATIENT)
Dept: PHYSICAL THERAPY | Facility: HOSPITAL | Age: 75
End: 2025-05-29
Attending: INTERNAL MEDICINE
Payer: COMMERCIAL

## 2025-05-29 ENCOUNTER — APPOINTMENT (OUTPATIENT)
Dept: OCCUPATIONAL THERAPY | Facility: HOSPITAL | Age: 75
End: 2025-05-29
Attending: INTERNAL MEDICINE
Payer: COMMERCIAL

## 2025-05-29 VITALS
HEART RATE: 93 BPM | SYSTOLIC BLOOD PRESSURE: 108 MMHG | DIASTOLIC BLOOD PRESSURE: 55 MMHG | OXYGEN SATURATION: 96 % | TEMPERATURE: 98 F | RESPIRATION RATE: 18 BRPM | BODY MASS INDEX: 24.47 KG/M2 | WEIGHT: 160.94 LBS

## 2025-05-29 DIAGNOSIS — K85.91 ACUTE PANCREATITIS WITH UNINFECTED NECROSIS, UNSPECIFIED: ICD-10-CM

## 2025-05-29 LAB
ALBUMIN SERPL BCG-MCNC: 3 G/DL (ref 3.5–5.2)
ALP SERPL-CCNC: 58 U/L (ref 40–150)
ALT SERPL W P-5'-P-CCNC: 15 U/L (ref 0–50)
AST SERPL W P-5'-P-CCNC: 19 U/L (ref 0–45)
BILIRUB DIRECT SERPL-MCNC: 0.51 MG/DL (ref 0–0.3)
BILIRUB SERPL-MCNC: 1.8 MG/DL
HCT VFR BLD AUTO: 31.6 % (ref 35–47)
HGB BLD-MCNC: 10.1 G/DL (ref 11.7–15.7)
HGB BLD-MCNC: 11 G/DL (ref 11.7–15.7)
MCV RBC AUTO: 90 FL (ref 78–100)
MCV RBC AUTO: 91 FL (ref 78–100)
PHOSPHATE SERPL-MCNC: 2.6 MG/DL (ref 2.5–4.5)
PLATELET # BLD AUTO: 146 10E3/UL (ref 150–450)
POTASSIUM SERPL-SCNC: 3.8 MMOL/L (ref 3.4–5.3)
PROT SERPL-MCNC: 5.4 G/DL (ref 6.4–8.3)

## 2025-05-29 PROCEDURE — 97535 SELF CARE MNGMENT TRAINING: CPT | Mod: GO

## 2025-05-29 PROCEDURE — 85018 HEMOGLOBIN: CPT | Performed by: INTERNAL MEDICINE

## 2025-05-29 PROCEDURE — 80076 HEPATIC FUNCTION PANEL: CPT | Performed by: INTERNAL MEDICINE

## 2025-05-29 PROCEDURE — 36415 COLL VENOUS BLD VENIPUNCTURE: CPT | Performed by: INTERNAL MEDICINE

## 2025-05-29 PROCEDURE — 250N000013 HC RX MED GY IP 250 OP 250 PS 637: Performed by: INTERNAL MEDICINE

## 2025-05-29 PROCEDURE — 97530 THERAPEUTIC ACTIVITIES: CPT | Mod: GP

## 2025-05-29 PROCEDURE — 84100 ASSAY OF PHOSPHORUS: CPT | Performed by: INTERNAL MEDICINE

## 2025-05-29 PROCEDURE — 84132 ASSAY OF SERUM POTASSIUM: CPT | Performed by: INTERNAL MEDICINE

## 2025-05-29 PROCEDURE — 97129 THER IVNTJ 1ST 15 MIN: CPT | Mod: GO

## 2025-05-29 PROCEDURE — 85014 HEMATOCRIT: CPT | Performed by: PHYSICIAN ASSISTANT

## 2025-05-29 PROCEDURE — 99207 PR APP CREDIT; MD BILLING SHARED VISIT: CPT | Performed by: INTERNAL MEDICINE

## 2025-05-29 PROCEDURE — 250N000013 HC RX MED GY IP 250 OP 250 PS 637: Performed by: FAMILY MEDICINE

## 2025-05-29 PROCEDURE — 85049 AUTOMATED PLATELET COUNT: CPT | Performed by: INTERNAL MEDICINE

## 2025-05-29 PROCEDURE — 99239 HOSP IP/OBS DSCHRG MGMT >30: CPT | Performed by: INTERNAL MEDICINE

## 2025-05-29 PROCEDURE — 97116 GAIT TRAINING THERAPY: CPT | Mod: GP

## 2025-05-29 RX ADMIN — POTASSIUM & SODIUM PHOSPHATES POWDER PACK 280-160-250 MG 1 PACKET: 280-160-250 PACK at 10:50

## 2025-05-29 RX ADMIN — PSYLLIUM HUSK 1 PACKET: 3.4 POWDER ORAL at 09:00

## 2025-05-29 RX ADMIN — POTASSIUM & SODIUM PHOSPHATES POWDER PACK 280-160-250 MG 1 PACKET: 280-160-250 PACK at 07:15

## 2025-05-29 RX ADMIN — POTASSIUM & SODIUM PHOSPHATES POWDER PACK 280-160-250 MG 1 PACKET: 280-160-250 PACK at 14:06

## 2025-05-29 RX ADMIN — POTASSIUM & SODIUM PHOSPHATES POWDER PACK 280-160-250 MG 1 PACKET: 280-160-250 PACK at 02:37

## 2025-05-29 ASSESSMENT — ACTIVITIES OF DAILY LIVING (ADL)
ADLS_ACUITY_SCORE: 48
ADLS_ACUITY_SCORE: 51
ADLS_ACUITY_SCORE: 48
ADLS_ACUITY_SCORE: 51
ADLS_ACUITY_SCORE: 48

## 2025-05-29 NOTE — DISCHARGE SUMMARY
[facility] MEDICINE  DISCHARGE SUMMARY     Primary Care Physician: Luisana Garza  Admission Date: 5/26/2025   Discharge Provider: Malcolm RAMIREZ MD Discharge Date: 5/29/2025   Diet:   Active Diet and Nourishment Order   Procedures    Low Fat Diet (up to 50g)    Diet       Code Status: Full Code   Activity: DCACTIVITY: Activity as tolerated        Condition at Discharge: Stable     REASON FOR PRESENTATION(See Admission Note for Details)   Acute abdominal pain.  Please refer to H&P for details    PRINCIPAL & ACTIVE DISCHARGE DIAGNOSES     Principal Problem:    Acute pancreatitis  Active Problems:    NPH (normal pressure hydrocephalus) (H)      PENDING LABS     Unresulted Labs Ordered in the Past 30 Days of this Admission       No orders found from 4/26/2025 to 5/27/2025.              PROCEDURES ( this hospitalization only)          RECOMMENDATIONS TO OUTPATIENT PROVIDER FOR F/U VISIT     Follow-up Appointments       Follow Up and recommended labs and tests      Follow up with [facility]iary physician.  The following labs/tests are recommended: CBC,RFP, LFT in 2-4 days, tissue provider to follow-up on results.    Follow-up with [facility] as recommended.  Patient supposed to receive call from [facility] for appointment.  If you do not hear from them in the next 2-3 business days, please call [facility] at [phone].                  DISPOSITION     Skilled Nursing Facility    SUMMARY OF HOSPITAL COURSE:      Vidya Brink is a 75 year old female with past medical history of James syndrome, NPH, recent hernia repair with mesh 6 weeks prior to admission who presented to urgent care for evaluation of acute onset abdominal pain.  CT imaging concerning for pancreatitis and sent to [facility].    Patient managed conservatively with gradually improving abdominal pain and tolerating low-fat diet now.  Patient followed by gastroenterology, etiology of pancreatitis is unclear  at this time.  Basic workup, ultrasound is unremarkable, negative for biliary dilatation or obvious obstructive lesion, triglyceride unremarkable.  No history of alcohol use.  Not on medication that can contribute to pancreatitis.  GI planning for EGD and EUS in few weeks and they will arrange outpatient follow-up.   Some drop in hemoglobin, attributed to hemodilution.  Hemoglobin around 10 range this morning and recheck this afternoon was 11 range.  Patient also had hypophosphatemia and replaced.  Patient with history of NPH with gait difficulties and lumbar radiculopathy.  No new concerning complaints per patient but reported generalized weakness, likely due to acute illness.  PT OT recommended TCU.  Patient and family members agreed for TCU discharge.    Please refer below for detailed hospital course.    Acute upper abdominal pain;  CT imaging concern for acute pancreatitis and edema;  -RUQ US reported normal gallbladder, no biliary duct dilatation.    -Mild hyperbilirubinemia, trending down.  -Manage conservatively and now tolerating low-fat diet.  --Appreciate GI input, please refer to their progress note for detail     Mild anemia and thrombocytopenia, likely due to hemodilution as patient received continuous IV fluid for above issues.    -No active/obvious bleeding.    -Hemoglobin 10.1 this morning, recheck this afternoon 11.0.  Recheck in few days at TCU.    Hypophosphatemia, replaced     Recent hernia repair, patient denied any concerns     History of James syndrome, status post subtotal colectomy.  Follows with [facility]     History of NPH  Neurology/neurosurgery note also reported history of right foot drop due to L5 chronic radiculopathy  -Patient denied new concerns but reports generalized weakness and reported gradually improving, attributed to ongoing acute illness.  -Follow-up with [facility].  Patient is scheduled to have  shunt in June.  Patient and family member had questions given acute  pancreatitis about timing of procedure.  GI team addressed that with patient and her daughter.     Mild drop in immunoglobulin levels likely due to acute illness.  Recheck through PCP as an outpatient once she recovers from pancreatitis, if persistently low than recommend further workup through PCP.      Discharge Medications with Med changes:     Current Discharge Medication List        CONTINUE these medications which have NOT CHANGED    Details   calcium carbonate-vitamin D (CALTRATE) 600-10 MG-MCG per tablet Take 1 tablet by mouth daily.      cyanocobalamin (VITAMIN B-12) 1000 MCG tablet Take 1,000 mcg by mouth daily.      ketoconazole (NIZORAL) 2 % external cream Apply topically as needed. To feet      RISEdronate (ACTONEL) 35 MG tablet Take 35 mg by mouth every 7 days. Takes on Sundays                   Rationale for medication changes:      No change on home medication regimen.        Consults       GASTROENTEROLOGY IP CONSULT  PHARMACY IP CONSULT  PHYSICAL THERAPY ADULT IP CONSULT  OCCUPATIONAL THERAPY ADULT IP CONSULT  CARE MANAGEMENT / SOCIAL WORK IP CONSULT  PHYSICAL THERAPY ADULT IP CONSULT  OCCUPATIONAL THERAPY ADULT IP CONSULT    Immunizations given this encounter     Most Recent Immunizations   Administered Date(s) Administered    COVID-19 12+ (MODERNA) 09/13/2024    COVID-19 Bivalent 18+ (Moderna) 04/27/2023    COVID-19 Monovalent 18+ (Moderna) 11/10/2021    DT (PEDS <7y) 05/06/1999    Flu 65+ (Fluad) 11/13/2021    Flu, Unspecified 01/06/2016    Influenza (High Dose) Trivalent,PF (Fluzone) 10/27/2020    Influenza Vaccine 65+ (Fluzone HD) 11/13/2021    Influenza Vaccine >6 months,quad, PF 12/03/2018    Influenza Vaccine, 6+MO IM (QUADRIVALENT W/PRESERVATIVES) 01/13/2015    Pneumo Conj 13-V (2010&after) 01/13/2015    Pneumococcal 23 valent 01/13/2015    TDAP (Adacel,Boostrix) 07/17/2008    Td,adult,historic,unspecified 07/17/2008    Zoster vaccine, live 10/20/2010           Anticoagulation Information   "    Recent INR results: No results for input(s): \"INR\" in the last 168 hours.        SIGNIFICANT IMAGING FINDINGS     No results found for this visit on 05/26/25.    SIGNIFICANT LABORATORY FINDINGS     Most Recent 3 CBC's:  Recent Labs   Lab Test 05/29/25  1300 05/29/25  0609 05/28/25  0746 05/27/25  0646 05/26/25  1907 01/27/25  1233   WBC  --   --   --  10.8 11.6* 6.1   HGB 11.0* 10.1* 11.0* 11.5* 12.5 13.0   MCV 91 90  90  90 90  90 89 89 92   PLT  --  146* 147* 158 186 209     Most Recent 3 BMP's:  Recent Labs   Lab Test 05/29/25  0609 05/28/25  0746 05/27/25  0646 05/26/25  1907   NA  --  140 136 137   POTASSIUM 3.8 3.5 3.6 4.0   CHLORIDE  --  107 105 104   CO2  --  25 26 25   BUN  --  9.8 24.4* 36.3*   CR  --  0.50* 0.50* 0.55   ANIONGAP  --  8 5* 8   YOSI  --  8.1* 7.9* 8.2*   GLC  --  95 94 141*     Most Recent 2 LFT's:  Recent Labs   Lab Test 05/29/25  0609 05/28/25  0746   AST 19 21   ALT 15 14   ALKPHOS 58 52   BILITOTAL 1.8* 2.4*  2.3*       Discharge Orders        General info for SNF    Length of Stay Estimate: Short Term Care: Estimated # of Days <30  Condition at Discharge: Improving  Level of care:skilled   Rehabilitation Potential: Good  Admission H&P remains valid and up-to-date: Yes  Recent Chemotherapy: N/A  Use Nursing Home Standing Orders: Yes     Mantoux instructions    Give two-step Mantoux (PPD) Per Facility Policy Yes     Reason for your hospital stay    Patient admitted for acute pancreatitis     Activity - Up with nursing assistance     Follow Up and recommended labs and tests    Follow up with residential physician.  The following labs/tests are recommended: CBC,RFP, LFT in 2-4 days, tissue provider to follow-up on results.    Follow-up with [facility] as recommended.  Patient supposed to receive call from [facility] for appointment.  If you do not hear from them in the next 2-3 business days, please call [facility] at [phone].     Full Code     Physical Therapy Adult Consult "    Evaluate and treat as clinically indicated.    Reason: Physical deconditioning     Occupational Therapy Adult Consult    Evaluate and treat as clinically indicated.    Reason: Physical deconditioning     Fall precautions     Diet    Follow this diet upon discharge: Current Diet:Orders Placed This Encounter      Low Fat Diet (up to 50g)       Examination   Physical Exam   Temp:  [98  F (36.7  C)-99.1  F (37.3  C)] 98  F (36.7  C)  Pulse:  [80-93] 93  Resp:  [16-18] 18  BP: ()/(55-58) 108/55  SpO2:  [92 %-96 %] 96 %  Wt Readings from Last 1 Encounters:   05/29/25 73 kg (160 lb 15 oz)       Please refer to my progress note from today for details.  Detailed plan of care after discharge discussed with patient and patient's daughter.        Please see EMR for more detailed significant labs, imaging, consultant notes etc.    Malcolm JIMENEZ MD, personally saw the patient today and spent greater than 30 minutes discharging this patient.    Malcolm RAMIREZ MD  [facility]    CC:Luisana Garza

## 2025-05-29 NOTE — PROGRESS NOTES
[facility]    Medicine Progress Note - Hospitalist Service    Date of Admission:  5/26/2025    Assessment & Plan   Vidya Brink is a 75 year old female with past medical history of James syndrome, NPH, recent hernia repair with mesh 6 weeks prior to admission who presented to urgent care for evaluation of acute onset abdominal pain.  CT imaging concerning for pancreatitis and sent to [facility].    Acute upper abdominal pain;  CT imaging concern for acute pancreatitis and edema;  -- RUQ US reported normal gallbladder, no biliary duct dilatation.  --No history of alcohol use.  --Patient with history of James syndrome.  -- Advance to low-fat diet and tolerating.    - I personally discussed with gastroenterologist, discussed about isolated hyperbilirubinemia, which seems chronic.  Reported patient will be further evaluated as an outpatient with EGD and EUS in 4 to 6 weeks.    Hypophosphatemia, replace per protocol.    Mild anemia and thrombocytopenia, likely due to hemodilution as patient received continuous IV fluid for above issues.  Now oral intake improving, discontinued IV fluid.  Monitor hemoglobin closely.    Recent hernia repair, patient denied any concerns    History of James syndrome, status post subtotal colectomy    History of NPH, follow-up with [facility] as scheduled.  Neurology/neurosurgery note also reported history of right foot drop due to L5 chronic radiculopathy  PT OT following    Mild drop in immunoglobulin levels likely due to acute illness.  Recheck through PCP as an outpatient once he recovers from pancreatitis, if persistently low than recommend further workup through PCP    Unwitnessed partially slid down to floor from chair.  Patient denied injuries or any new concerns.  Appreciated house officer input          Diet: Low Fat Diet (up to 50g)    DVT Prophylaxis: Pneumatic Compression Devices, Ambulate every shift, and not on pharmacological agent given  thrombocytopenia and anemia  Medina Catheter: Not present  Lines: None     Cardiac Monitoring: None  Code Status: Full Code      Clinically Significant Risk Factors               # Hypoalbuminemia: Lowest albumin = 2.9 g/dL at 5/28/2025  7:46 AM, will monitor as appropriate                    # Financial/Environmental Concerns: none         Social Drivers of Health            Disposition Plan     Medically Ready for Discharge: Anticipated Today, awaiting for placement             Malcolm RAMIREZ MD  Hospitalist Service  [facility]  Securely message with [employer] (more info)  Text page via [employer] Paging/Directory   ______________________________________________________________________    Interval History   Patient seen and examined.  Notes, labs, imaging report personally reviewed. Patient reported abdominal pain is improving, no nausea or vomiting and reported tolerating regular diet. Patient reported having 1 loose stool this morning.  Patient with history of NPH with gait difficulties, Patient denied new focal weakness or numbness.    Myself and GI team had detailed conversation with patient and patient's daughter on the phone.  They are concerned about drop in hemoglobin, no hematuria, melena, hematochezia, likely hemodilution, requesting to recheck lab, ordered recheck hemoglobin.  They are also concerned about upcoming procedure, GI addressed that in detail.  They are also uncertain whether they would like to go to acute inpatient rehab and/or TCU and thinking about possible home with home care.  I informed them that these are our recommendation.   Discussed with care manager/.  Addendum;  Received call from care manager, reported finding TCU bed, also reported care manager discussed with patient, family members and they agreed.   I again discussed with patient, updated her hemoglobin 11 this afternoon on recheck and also reported she agreed with discharge and she already discuss  with her family members.    Physical Exam   Vital Signs: Temp: 99.1  F (37.3  C) Temp src: Oral BP: 105/57 Pulse: 80   Resp: 18 SpO2: 92 % O2 Device: None (Room air)    Weight: 160 lbs 14.97 oz      General: Not in obvious distress.  HEENT: Normocephalic, supple neck  Chest: Clear to auscultation bilateral anteriorly, no wheezing  Heart: S1S2 normal, regular  Abdomen: Soft.  Still has some tenderness on upper abdomen, no rebound or guarding  Neuro: alert and awake, follows simple commands appropriately, moves all extremity        Medical Decision Making             Data     I have personally reviewed the following data over the past 24 hrs:    N/A  \   10.1 (L)   / 146 (L)     N/A N/A N/A /  N/A   3.8 N/A N/A \     ALT: 15 AST: 19 AP: 58 TBILI: 1.8 (H)   ALB: 3.0 (L) TOT PROTEIN: 5.4 (L) LIPASE: N/A       Imaging results reviewed over the past 24 hrs:   No results found for this or any previous visit (from the past 24 hours).

## 2025-05-29 NOTE — PLAN OF CARE
"Goal Outcome Evaluation:  Problem: Adult Inpatient Plan of Care  Goal: Optimal Comfort and Wellbeing  Outcome: Progressing  Patient denies pain when asked. Reports that the Tylenol that she took in the afternoon relieved all of her pain.     Problem: Risk for Delirium  Goal: Improved Sleep  Outcome: Progressing  Patient reported that she was able to sleep \"fairly well\" overnight.        "

## 2025-05-29 NOTE — PROGRESS NOTES
Care Management Follow Up    Length of Stay (days): 3    Expected Discharge Date: 05/29/2025     Concerns to be Addressed:       Patient plan of care discussed at interdisciplinary rounds: Yes    Anticipated Discharge Disposition:        ARU vs TCU        Anticipated Discharge Services: TBD   Anticipated Discharge DME:  na    Patient/family educated on Medicare website which has current facility and service quality ratings:  yes  Education Provided on the Discharge Plan:  yes  Patient/Family in Agreement with the Plan:  yes    Referrals Placed by CM/SW:    Private pay costs discussed: Not applicable    Discussed  Partnership in Safe Discharge Planning  document with patient/family: No     Handoff Completed: No, handoff not indicated or clinically appropriate    Additional Information:  Met with patient in her room to gather ARU choices. Patient feeling overwhelmed and not understanding the difference between this and TCU.  CM discussed this with her. She asked that referrals be sent to [facility]. Referrals sent  8:28 AM   Call[facility] and [facility]ic[facility]  8:42 AM   [facility] has a wait list and does not expect openings through mid next week  8:51 AM  Spoke with patient and referral sent to [facility]  10:01 AM   [facility] not meet the medical needs for [facility]. JEANINE sent TCU referrals  11:39 AM   Received a call from [facility] that patient does not have the medical need for ARU  2:20 PM received a call from [facility] that they can take the patient today. Would like orders in by 3pm and the patient to arrive before 6pm. Patient agrees with shared bathroom, semi private room and will take bed. Updated MD about the orders and spouse states he will be here by 4pm to transport  Updated daughter and spouse regarding TCU and they are both accepting. Informed HUC, charge RN andMD  Care Management Discharge  Note    Discharge Date: 05/29/2025       Discharge Disposition:  [facility] TCU    Discharge Services:  none    Discharge DME: none     Discharge Transportation: family or friend will provide    Private pay costs discussed: Not applicable    Does the patient's insurance plan have a 3 day qualifying hospital stay waiver?  No    PAS Confirmation Code:    Patient/family educated on Medicare website which has current facility and service quality ratings:      Education Provided on the Discharge Plan:  yes  Persons Notified of Discharge Plans: daughter, spouse,charge RN, [facility], facility, MD  Patient/Family in Agreement with the Plan:  yes    Handoff Referral Completed: No, handoff not indicated or clinically appropriate    Additional Information:  Patient will discharge to [facility] TCU with spouse to transport around 4pm. Orders faxed to Facility. PAS completed    Alexia Mccarthy RNCC

## 2025-05-29 NOTE — PLAN OF CARE
"  Problem: Adult Inpatient Plan of Care  Goal: Plan of Care Review  Description: The Plan of Care Review/Shift note should be completed every shift.  The Outcome Evaluation is a brief statement about your assessment that the patient is improving, declining, or no change.  This information will be displayed automatically on your shift  note.  Outcome: Met  Goal: Patient-Specific Goal (Individualized)  Description: You can add care plan individualizations to a care plan. Examples of Individualization might be:  \"Parent requests to be called daily at 9am for status\", \"I have a hard time hearing out of my right ear\", or \"Do not touch me to wake me up as it startles  me\".  Outcome: Met  Goal: Absence of Hospital-Acquired Illness or Injury  Outcome: Met  Goal: Optimal Comfort and Wellbeing  Outcome: Met  Goal: Readiness for Transition of Care  Outcome: Met     Problem: Infection  Goal: Absence of Infection Signs and Symptoms  Outcome: Met     Problem: Pancreatitis  Goal: Fluid and Electrolyte Balance  Outcome: Met  Goal: Absence of Infection Signs and Symptoms  Outcome: Met  Goal: Optimal Nutrition Delivery  Outcome: Met  Goal: Optimal Pain Control and Function  Outcome: Met  Goal: Effective Oxygenation and Ventilation  Outcome: Met     Problem: Fall Injury Risk  Goal: Absence of Fall and Fall-Related Injury  Outcome: Met     Problem: Risk for Delirium  Goal: Optimal Coping  Outcome: Met  Goal: Improved Behavioral Control  Outcome: Met  Goal: Improved Attention and Thought Clarity  Outcome: Met  Goal: Improved Sleep  Outcome: Met   Goal Outcome Evaluation:                  Pt is alert and oriented x4. Pt denies pain. Pt will discharge to tcu at around 1630. Pt's spouse is transferring pt to TCU.  Pt received all of her discharge paperwork and belongings.           "

## 2025-05-29 NOTE — PROGRESS NOTES
[facility] Progress Note     IMPRESSION:  75-year-old female with PMH of James syndrome, NPH, abdominal hernia s/p repair was admitted on 5/26/2025 for acute pancreatitis with admitting symptoms of abdominal pains.    1.  Acute pancreatitis - improved  CT with changes consistent with pancreatitis with fluid throughout the abdomen pelvis and adjacent to the pancreas.  Etiology of pancreatitis is unclear.  Ultrasound is unremarkable, negative for biliary dilatation or obvious obstructive lesion.  LFTs, calcium, triglycerides unremarkable.  No history of alcohol abuse.    2.  James syndrome  Patient is a patient of [facility] and has regular sigmoidoscopies for evaluation.  Last sigmoidoscopy March 2025 noted normal anastomosis and neoterminal ileum.  Last EUS was in December 2023 with Dr. Vasquez, findings were unremarkable.    3.  Mild drop in hemoglobin  Hgb 12.5 at admission -> 10.1. No evidence of overt GI bleeding.  No obvious large hematoma or hematuria.  This is likely dilutional given IV fluids for pancreatitis (decrease in both hemoglobin and hematocrit is often consistent with dilution effect). BUN/Cr ratio is normal which argues against brisk upper GI bleed.     RECOMMENDATIONS:  - Diet: As tolerated    - Outpatient EGD/EUS in at least 4-6 weeks to allow healing of the pancreas. EGD/EUS can be postponed further if needed depending how how she is feeling after her shunt surgery with [place].     - Ok to repeat CBC per patient request.    - No indications for urgent EGD/Colonoscopy at this time without kelsey evidence of GI bleeding. I have VERY LOW suspicion for GI bleed. Benefits do NOT outweigh the risks at this time. If hgb continues to trend down as an outpatient, can always consider repeating sigmoidoscopy in addition to the EGD/EUS to assess for GI losses (although she just had this done in March and was unremarkable).     Ok for discharge from GI standpoint.     GI will sign off at this time. Thank  you for consulting us on this pleasant patient. Please call if questions arise or the patient's status changes.       25 minutes of total time was spent providing patient care, including patient evaluation, reviewing documentation/test results, and     Bryson Cheatham PA-C  [facility]  [phone]  ________________________________________________________________________      SUBJECTIVE:    Hospitalist in the room  Patient is feeling better. No increased abdominal pains.  No evidence of bleeding.      OBJECTIVE:  /57 (BP Location: Right arm)   Pulse 80   Temp 99.1  F (37.3  C) (Oral)   Resp 18   Wt 73 kg (160 lb 15 oz)   SpO2 92%   BMI 24.47 kg/m    Temp (24hrs), Av.6  F (37  C), Min:98  F (36.7  C), Max:99.1  F (37.3  C)    Patient Vitals for the past 72 hrs:   Weight   25 0540 73 kg (160 lb 15 oz)   25 0405 72.9 kg (160 lb 11.2 oz)   25 0644 75.5 kg (166 lb 7.2 oz)   25 1831 71.8 kg (158 lb 4.6 oz)       Intake/Output Summary (Last 24 hours) at 2025 1027  Last data filed at 2025 1000  Gross per 24 hour   Intake 1513 ml   Output --   Net 1513 ml        PHYSICAL EXAM  GEN: Alert, oriented x3, communicative and in NAD.    LYMPH: No LAD noted.  HRT: RRR  LUNGS: CTA  ABD:  ND, +BS, no guarding or pain to palpation, no rebound, no HSM.  SKIN: No rash, jaundice or spider angiomata      LABS:  I have reviewed the patient's new clinical lab results:     Recent Labs   Lab Test 25  0609 25  0746 25  0646 25  1907 25  1233   WBC  --   --  10.8 11.6* 6.1   HGB 10.1* 11.0* 11.5* 12.5 13.0   MCV 90  90  90 90  90 89 89 92   * 147* 158 186 209   INR  --   --   --   --  0.99     Recent Labs   Lab Test 25  0609 25  0746 25  0646 25  1907   POTASSIUM 3.8 3.5 3.6 4.0   CHLORIDE  --  107 105 104   CO2  --     BUN  --  9.8 24.4* 36.3*   CR  --  0.50* 0.50* 0.55   ANIONGAP  --  8 5* 8     Recent  Labs   Lab Test 05/29/25  0609 05/28/25  0746 05/27/25  0646 05/26/25  1907 09/11/20  1550 09/11/19  1415 01/29/19  0929   ALBUMIN 3.0* 2.9* 3.2* 3.4*   < >  --  3.7   BILITOTAL 1.8* 2.4*  2.3* 2.0* 1.5*   < >  --  1.2*   ALT 15 14 9 10   < >  --  14   AST 19 21 11 13   < >  --  17   PROTEIN  --   --   --   --   --  Negative Negative   LIPASE  --   --  47 132*  --   --   --     < > = values in this interval not displayed.         IMAGING:  Reviewed

## 2025-05-29 NOTE — PROGRESS NOTES
Occupational Therapy Discharge Summary    Reason for therapy discharge:    Discharged to transitional care facility.    Progress towards therapy goal(s). See goals on Care Plan in [facility] electronic health record for goal details.  Goals partially met.  Barriers to achieving goals:   discharge from facility.    Therapy recommendation(s):    Continued therapy is recommended.  Rationale/Recommendations:  At TCU to progress ind and safety.

## 2025-05-29 NOTE — PLAN OF CARE
Physical Therapy Discharge Summary    Reason for therapy discharge:    Discharged to transitional care facility.    Progress towards therapy goal(s). See goals on Care Plan in [facility] electronic health record for goal details.  Goals partially met.  Barriers to achieving goals:   Due tp weakness.    Therapy recommendation(s):    Continued therapy is recommended.  Rationale/Recommendations:  To improve mobility and strength..

## 2025-05-29 NOTE — PLAN OF CARE
Problem: Pancreatitis  Goal: Fluid and Electrolyte Balance  Outcome: Progressing  Goal: Absence of Infection Signs and Symptoms  Outcome: Progressing  Goal: Optimal Nutrition Delivery  Outcome: Progressing  Goal: Optimal Pain Control and Function  Outcome: Progressing  Goal: Effective Oxygenation and Ventilation  Outcome: Progressing  Intervention: Optimize Oxygenation and Ventilation  Recent Flowsheet Documentation  Taken 5/29/2025 0953 by Cata Dodge, RN  Activity Management:   ambulated to bathroom   up in chair   ambulated outside room  Taken 5/29/2025 0950 by Cata Dodge RN  Cough And Deep Breathing: done with encouragement  Activity Management: ambulated to bathroom   Goal Outcome Evaluation: Patient electrolytes and HGB better than this AM. See results for more information. VS are./55 (BP Location: Left arm)   Pulse 93   Temp 98  F (36.7  C) (Oral)   Resp 18   Wt 73 kg (160 lb 15 oz)   SpO2 96%   BMI 24.47 kg/m  . Care plan ongoing.        Cata Dacosta, RN

## 2025-05-30 ENCOUNTER — LAB REQUISITION (OUTPATIENT)
Dept: LAB | Facility: CLINIC | Age: 75
End: 2025-05-30
Payer: COMMERCIAL

## 2025-05-30 DIAGNOSIS — K85.91 ACUTE PANCREATITIS WITH UNINFECTED NECROSIS, UNSPECIFIED: ICD-10-CM

## 2025-06-02 ENCOUNTER — RESULTS FOLLOW-UP (OUTPATIENT)
Dept: GERIATRICS | Facility: CLINIC | Age: 75
End: 2025-06-02

## 2025-06-02 LAB
ALBUMIN SERPL BCG-MCNC: 3.7 G/DL (ref 3.5–5.2)
ANION GAP SERPL CALCULATED.3IONS-SCNC: 14 MMOL/L (ref 7–15)
BUN SERPL-MCNC: 20.1 MG/DL (ref 8–23)
CALCIUM SERPL-MCNC: 9.5 MG/DL (ref 8.8–10.4)
CHLORIDE SERPL-SCNC: 104 MMOL/L (ref 98–107)
CREAT SERPL-MCNC: 0.55 MG/DL (ref 0.51–0.95)
EGFRCR SERPLBLD CKD-EPI 2021: >90 ML/MIN/1.73M2
ERYTHROCYTE [DISTWIDTH] IN BLOOD BY AUTOMATED COUNT: 13.3 % (ref 10–15)
HCO3 SERPL-SCNC: 24 MMOL/L (ref 22–29)
HCT VFR BLD AUTO: 38.7 % (ref 35–47)
HGB BLD-MCNC: 11.6 G/DL (ref 11.7–15.7)
MAGNESIUM SERPL-MCNC: 2.1 MG/DL (ref 1.7–2.3)
MCH RBC QN AUTO: 28.4 PG (ref 26.5–33)
MCHC RBC AUTO-ENTMCNC: 30 G/DL (ref 31.5–36.5)
MCV RBC AUTO: 95 FL (ref 78–100)
PHOSPHATE SERPL-MCNC: 3.1 MG/DL (ref 2.5–4.5)
PHOSPHATE SERPL-MCNC: 3.1 MG/DL (ref 2.5–4.5)
PLATELET # BLD AUTO: 276 10E3/UL (ref 150–450)
POTASSIUM SERPL-SCNC: 3.8 MMOL/L (ref 3.4–5.3)
RBC # BLD AUTO: 4.09 10E6/UL (ref 3.8–5.2)
SODIUM SERPL-SCNC: 142 MMOL/L (ref 135–145)
VIT D+METAB SERPL-MCNC: 28 NG/ML (ref 20–50)
WBC # BLD AUTO: 5.6 10E3/UL (ref 4–11)

## 2025-06-02 PROCEDURE — 82248 BILIRUBIN DIRECT: CPT | Mod: ORL | Performed by: NURSE PRACTITIONER

## 2025-06-02 PROCEDURE — 84100 ASSAY OF PHOSPHORUS: CPT | Mod: ORL | Performed by: NURSE PRACTITIONER

## 2025-06-02 PROCEDURE — 83735 ASSAY OF MAGNESIUM: CPT | Mod: ORL | Performed by: NURSE PRACTITIONER

## 2025-06-02 PROCEDURE — 36415 COLL VENOUS BLD VENIPUNCTURE: CPT | Mod: ORL | Performed by: NURSE PRACTITIONER

## 2025-06-02 PROCEDURE — 84100 ASSAY OF PHOSPHORUS: CPT | Performed by: NURSE PRACTITIONER

## 2025-06-02 PROCEDURE — P9604 ONE-WAY ALLOW PRORATED TRIP: HCPCS | Mod: ORL | Performed by: NURSE PRACTITIONER

## 2025-06-02 PROCEDURE — 82306 VITAMIN D 25 HYDROXY: CPT | Mod: ORL | Performed by: NURSE PRACTITIONER

## 2025-06-02 PROCEDURE — 85027 COMPLETE CBC AUTOMATED: CPT | Mod: ORL | Performed by: NURSE PRACTITIONER

## 2025-06-03 ENCOUNTER — THERAPY VISIT (OUTPATIENT)
Dept: PHYSICAL THERAPY | Facility: CLINIC | Age: 75
End: 2025-06-03
Payer: COMMERCIAL

## 2025-06-03 DIAGNOSIS — R26.89 BALANCE PROBLEM: ICD-10-CM

## 2025-06-03 DIAGNOSIS — G91.2 NORMAL PRESSURE HYDROCEPHALUS (H): Primary | ICD-10-CM

## 2025-06-03 LAB
ALBUMIN SERPL BCG-MCNC: 3.8 G/DL (ref 3.5–5.2)
ALP SERPL-CCNC: 71 U/L (ref 40–150)
ALT SERPL W P-5'-P-CCNC: 19 U/L (ref 0–50)
AST SERPL W P-5'-P-CCNC: 24 U/L (ref 0–45)
BILIRUB DIRECT SERPL-MCNC: 0.15 MG/DL (ref 0–0.45)
BILIRUB SERPL-MCNC: 0.6 MG/DL
GLUCOSE SERPL-MCNC: 106 MG/DL (ref 70–99)
PROT SERPL-MCNC: 6.7 G/DL (ref 6.4–8.3)

## 2025-06-03 PROCEDURE — 97116 GAIT TRAINING THERAPY: CPT | Mod: GP

## 2025-06-03 PROCEDURE — 97110 THERAPEUTIC EXERCISES: CPT | Mod: GP

## 2025-07-21 ENCOUNTER — TRANSCRIBE ORDERS (OUTPATIENT)
Dept: NEUROSURGERY | Facility: CLINIC | Age: 75
End: 2025-07-21
Payer: COMMERCIAL

## 2025-07-21 DIAGNOSIS — G91.2 NORMAL PRESSURE HYDROCEPHALUS (H): Primary | ICD-10-CM

## 2025-07-23 ENCOUNTER — THERAPY VISIT (OUTPATIENT)
Dept: OCCUPATIONAL THERAPY | Facility: CLINIC | Age: 75
End: 2025-07-23
Payer: COMMERCIAL

## 2025-07-23 DIAGNOSIS — R42 DIZZINESS: Primary | ICD-10-CM

## 2025-07-23 DIAGNOSIS — Z78.9 DECREASED ACTIVITIES OF DAILY LIVING (ADL): ICD-10-CM

## 2025-07-23 DIAGNOSIS — R26.81 UNSTEADINESS: ICD-10-CM

## 2025-07-23 PROCEDURE — 97535 SELF CARE MNGMENT TRAINING: CPT | Mod: GO | Performed by: OCCUPATIONAL THERAPIST

## 2025-08-07 ENCOUNTER — THERAPY VISIT (OUTPATIENT)
Dept: PHYSICAL THERAPY | Facility: CLINIC | Age: 75
End: 2025-08-07
Payer: COMMERCIAL

## 2025-08-07 DIAGNOSIS — G91.2 NORMAL PRESSURE HYDROCEPHALUS (H): Primary | ICD-10-CM

## 2025-08-07 DIAGNOSIS — R26.89 BALANCE PROBLEM: ICD-10-CM

## 2025-08-13 ENCOUNTER — THERAPY VISIT (OUTPATIENT)
Dept: PHYSICAL THERAPY | Facility: CLINIC | Age: 75
End: 2025-08-13
Payer: COMMERCIAL

## 2025-08-13 DIAGNOSIS — R26.89 BALANCE PROBLEM: ICD-10-CM

## 2025-08-13 DIAGNOSIS — G91.2 NORMAL PRESSURE HYDROCEPHALUS (H): Primary | ICD-10-CM

## 2025-08-13 PROCEDURE — 97112 NEUROMUSCULAR REEDUCATION: CPT | Mod: GP

## 2025-08-27 ENCOUNTER — THERAPY VISIT (OUTPATIENT)
Dept: PHYSICAL THERAPY | Facility: CLINIC | Age: 75
End: 2025-08-27
Payer: COMMERCIAL

## 2025-08-27 DIAGNOSIS — R26.89 BALANCE PROBLEM: ICD-10-CM

## 2025-08-27 DIAGNOSIS — G91.2 NORMAL PRESSURE HYDROCEPHALUS (H): Primary | ICD-10-CM

## 2025-08-27 PROCEDURE — 97112 NEUROMUSCULAR REEDUCATION: CPT | Mod: GP

## 2025-08-27 PROCEDURE — 97110 THERAPEUTIC EXERCISES: CPT | Mod: GP
